# Patient Record
Sex: MALE | Race: WHITE | NOT HISPANIC OR LATINO | Employment: OTHER | URBAN - NONMETROPOLITAN AREA
[De-identification: names, ages, dates, MRNs, and addresses within clinical notes are randomized per-mention and may not be internally consistent; named-entity substitution may affect disease eponyms.]

---

## 2021-01-28 ENCOUNTER — IMMUNIZATIONS (OUTPATIENT)
Dept: FAMILY MEDICINE CLINIC | Facility: HOSPITAL | Age: 86
End: 2021-01-28

## 2021-01-28 DIAGNOSIS — Z23 ENCOUNTER FOR IMMUNIZATION: Primary | ICD-10-CM

## 2021-01-28 PROCEDURE — 91301 SARS-COV-2 / COVID-19 MRNA VACCINE (MODERNA) 100 MCG: CPT

## 2021-01-28 PROCEDURE — 0011A SARS-COV-2 / COVID-19 MRNA VACCINE (MODERNA) 100 MCG: CPT

## 2021-02-23 ENCOUNTER — IMMUNIZATIONS (OUTPATIENT)
Dept: FAMILY MEDICINE CLINIC | Facility: HOSPITAL | Age: 86
End: 2021-02-23

## 2021-02-23 DIAGNOSIS — Z23 ENCOUNTER FOR IMMUNIZATION: Primary | ICD-10-CM

## 2021-02-23 PROCEDURE — 91301 SARS-COV-2 / COVID-19 MRNA VACCINE (MODERNA) 100 MCG: CPT

## 2021-02-23 PROCEDURE — 0012A SARS-COV-2 / COVID-19 MRNA VACCINE (MODERNA) 100 MCG: CPT

## 2021-08-03 DIAGNOSIS — R60.0 LOCALIZED EDEMA: ICD-10-CM

## 2021-08-03 DIAGNOSIS — R06.02 SHORTNESS OF BREATH: ICD-10-CM

## 2021-08-03 DIAGNOSIS — E11.9 TYPE 2 DIABETES MELLITUS WITHOUT COMPLICATIONS (HCC): ICD-10-CM

## 2021-08-03 DIAGNOSIS — E78.5 HYPERLIPIDEMIA, UNSPECIFIED: ICD-10-CM

## 2021-08-03 DIAGNOSIS — R60.9 EDEMA, UNSPECIFIED: ICD-10-CM

## 2021-08-03 DIAGNOSIS — I10 ESSENTIAL (PRIMARY) HYPERTENSION: ICD-10-CM

## 2021-08-12 ENCOUNTER — HOSPITAL ENCOUNTER (OUTPATIENT)
Dept: NON INVASIVE DIAGNOSTICS | Facility: HOSPITAL | Age: 86
Discharge: HOME | End: 2021-08-12
Payer: COMMERCIAL

## 2021-08-12 DIAGNOSIS — R60.0 LOCALIZED EDEMA: ICD-10-CM

## 2021-08-12 PROCEDURE — 93970 EXTREMITY STUDY: CPT

## 2021-08-12 PROCEDURE — 93970 EXTREMITY STUDY: CPT | Performed by: SURGERY

## 2021-09-09 ENCOUNTER — HOSPITAL ENCOUNTER (OUTPATIENT)
Dept: NUCLEAR MEDICINE | Facility: HOSPITAL | Age: 86
Discharge: HOME | End: 2021-09-09
Payer: COMMERCIAL

## 2021-09-09 ENCOUNTER — HOSPITAL ENCOUNTER (OUTPATIENT)
Dept: NON INVASIVE DIAGNOSTICS | Facility: HOSPITAL | Age: 86
Discharge: HOME | End: 2021-09-09
Payer: COMMERCIAL

## 2021-09-09 DIAGNOSIS — R06.02 SHORTNESS OF BREATH: ICD-10-CM

## 2021-09-09 DIAGNOSIS — E11.69 TYPE 2 DIABETES MELLITUS WITH OTHER SPECIFIED COMPLICATION, UNSPECIFIED WHETHER LONG TERM INSULIN USE (HCC): ICD-10-CM

## 2021-09-09 DIAGNOSIS — R60.9 EDEMA, UNSPECIFIED TYPE: ICD-10-CM

## 2021-09-09 DIAGNOSIS — I15.9 SECONDARY HYPERTENSION: ICD-10-CM

## 2021-09-09 DIAGNOSIS — E78.5 DYSLIPIDEMIA: ICD-10-CM

## 2021-09-09 PROCEDURE — 78452 HT MUSCLE IMAGE SPECT MULT: CPT

## 2021-09-09 PROCEDURE — A9502 TC99M TETROFOSMIN: HCPCS

## 2021-09-09 PROCEDURE — 93017 CV STRESS TEST TRACING ONLY: CPT

## 2021-09-09 PROCEDURE — G1004 CDSM NDSC: HCPCS

## 2021-09-09 RX ORDER — LISINOPRIL 20 MG/1
TABLET ORAL
COMMUNITY
Start: 2021-09-06

## 2021-09-09 RX ORDER — POTASSIUM CHLORIDE 750 MG/1
10 CAPSULE, EXTENDED RELEASE ORAL DAILY
COMMUNITY
Start: 2021-08-02

## 2021-09-09 RX ORDER — FUROSEMIDE 20 MG/1
20 TABLET ORAL DAILY
COMMUNITY
Start: 2021-08-02

## 2021-09-09 RX ORDER — LATANOPROST 50 UG/ML
SOLUTION/ DROPS OPHTHALMIC
COMMUNITY
Start: 2021-08-10

## 2021-09-09 RX ORDER — TIMOLOL MALEATE 5 MG/ML
SOLUTION/ DROPS OPHTHALMIC
COMMUNITY
Start: 2021-06-21

## 2021-09-09 RX ORDER — MELOXICAM 7.5 MG/1
7.5 TABLET ORAL DAILY
COMMUNITY

## 2021-09-09 RX ADMIN — REGADENOSON 0.4 MG: 0.08 INJECTION, SOLUTION INTRAVENOUS at 10:33

## 2021-09-27 LAB
ARRHY DURING EX: NORMAL
CHEST PAIN STATEMENT: NORMAL
MAX DIASTOLIC BP: 98 MMHG
MAX HEART RATE: 86 BPM
MAX PREDICTED HEART RATE: 136 BPM
MAX. SYSTOLIC BP: 198 MMHG
PROTOCOL NAME: NORMAL
REASON FOR TERMINATION: NORMAL
TARGET HR FORMULA: NORMAL
TEST INDICATION: NORMAL
TIME IN EXERCISE PHASE: NORMAL

## 2021-10-21 ENCOUNTER — HOSPITAL ENCOUNTER (OUTPATIENT)
Dept: NON INVASIVE DIAGNOSTICS | Facility: HOSPITAL | Age: 86
Discharge: HOME | End: 2021-10-21
Payer: COMMERCIAL

## 2021-10-21 VITALS
HEART RATE: 70 BPM | HEIGHT: 67 IN | BODY MASS INDEX: 31.23 KG/M2 | DIASTOLIC BLOOD PRESSURE: 76 MMHG | SYSTOLIC BLOOD PRESSURE: 132 MMHG | WEIGHT: 199 LBS

## 2021-10-21 DIAGNOSIS — E11.9 TYPE 2 DIABETES MELLITUS WITHOUT COMPLICATIONS (HCC): ICD-10-CM

## 2021-10-21 DIAGNOSIS — I10 ESSENTIAL (PRIMARY) HYPERTENSION: ICD-10-CM

## 2021-10-21 DIAGNOSIS — R60.9 EDEMA, UNSPECIFIED: ICD-10-CM

## 2021-10-21 DIAGNOSIS — R06.02 SHORTNESS OF BREATH: ICD-10-CM

## 2021-10-21 DIAGNOSIS — E78.5 HYPERLIPIDEMIA, UNSPECIFIED: ICD-10-CM

## 2021-10-21 LAB
AORTIC ROOT: 4.4 CM
APICAL FOUR CHAMBER EJECTION FRACTION: 70 %
ASCENDING AORTA: 3.3 CM
FRACTIONAL SHORTENING: 24 % (ref 28–44)
INTERVENTRICULAR SEPTUM IN DIASTOLE (PARASTERNAL SHORT AXIS VIEW): 1 CM
LAAS-AP4: 16 CM2
LEFT INTERNAL DIMENSION IN SYSTOLE: 2.8 CM (ref 2.1–4)
LEFT VENTRICULAR INTERNAL DIMENSION IN DIASTOLE: 3.7 CM (ref 5.28–7.87)
LEFT VENTRICULAR POSTERIOR WALL IN END DIASTOLE: 1 CM
LEFT VENTRICULAR STROKE VOLUME: 28 ML
LV EF: 41 %
RIGHT ATRIAL 2D VOLUME: 32 ML
RIGHT VENTRICLE ID DIMENSION: 3 CM
RV PSP: 31 MMHG
SL CV LV EF: 60
SL CV PED ECHO LEFT VENTRICLE DIASTOLIC VOLUME (MOD BIPLANE) 2D: 57 ML
SL CV PED ECHO LEFT VENTRICLE SYSTOLIC VOLUME (MOD BIPLANE) 2D: 29 ML
TR PEAK VELOCITY: 2.6 M/S
TRICUSPID VALVE PEAK REGURGITATION VELOCITY: 2.55 M/S
TRICUSPID VALVE S': 50 CM/S
TV PEAK GRADIENT: 26 MMHG
Z-SCORE OF LEFT VENTRICULAR DIMENSION IN END SYSTOLE: -5.55

## 2021-10-21 PROCEDURE — 93306 TTE W/DOPPLER COMPLETE: CPT

## 2021-11-02 ENCOUNTER — IMMUNIZATIONS (OUTPATIENT)
Dept: FAMILY MEDICINE CLINIC | Facility: HOSPITAL | Age: 86
End: 2021-11-02

## 2021-11-02 DIAGNOSIS — Z23 ENCOUNTER FOR IMMUNIZATION: Primary | ICD-10-CM

## 2023-04-14 PROBLEM — M17.0 PRIMARY OSTEOARTHRITIS OF BOTH KNEES: Status: ACTIVE | Noted: 2023-04-14

## 2023-04-14 PROBLEM — M25.561 CHRONIC PAIN OF BOTH KNEES: Status: ACTIVE | Noted: 2023-04-14

## 2023-04-14 PROBLEM — G89.29 CHRONIC PAIN OF BOTH KNEES: Status: ACTIVE | Noted: 2023-04-14

## 2023-04-14 PROBLEM — M25.562 CHRONIC PAIN OF BOTH KNEES: Status: ACTIVE | Noted: 2023-04-14

## 2024-05-14 ENCOUNTER — HOSPITAL ENCOUNTER (OUTPATIENT)
Dept: NON INVASIVE DIAGNOSTICS | Facility: HOSPITAL | Age: 89
Discharge: HOME | End: 2024-05-14
Payer: COMMERCIAL

## 2024-05-14 VITALS
BODY MASS INDEX: 29.62 KG/M2 | HEART RATE: 72 BPM | HEIGHT: 69 IN | SYSTOLIC BLOOD PRESSURE: 160 MMHG | DIASTOLIC BLOOD PRESSURE: 80 MMHG | WEIGHT: 200 LBS

## 2024-05-14 DIAGNOSIS — R06.02 SHORTNESS OF BREATH: ICD-10-CM

## 2024-05-14 DIAGNOSIS — R01.1 CARDIAC MURMUR, UNSPECIFIED: ICD-10-CM

## 2024-05-14 DIAGNOSIS — I10 ESSENTIAL (PRIMARY) HYPERTENSION: ICD-10-CM

## 2024-05-14 LAB
AORTIC ROOT: 3.6 CM
ASCENDING AORTA: 2.9 CM
BSA FOR ECHO PROCEDURE: 2.07 M2
RIGHT VENTRICLE ID DIMENSION: 4.2 CM
SL CV LV EF: 74

## 2024-05-14 PROCEDURE — 93306 TTE W/DOPPLER COMPLETE: CPT

## 2025-01-08 ENCOUNTER — PREP FOR PROCEDURE (OUTPATIENT)
Dept: PAIN MEDICINE | Facility: CLINIC | Age: 89
End: 2025-01-08

## 2025-01-08 ENCOUNTER — HOSPITAL ENCOUNTER (OUTPATIENT)
Dept: RADIOLOGY | Facility: CLINIC | Age: 89
Discharge: HOME | End: 2025-01-08
Payer: COMMERCIAL

## 2025-01-08 VITALS — WEIGHT: 198.4 LBS | HEIGHT: 67 IN | BODY MASS INDEX: 31.14 KG/M2

## 2025-01-08 DIAGNOSIS — M25.562 CHRONIC PAIN OF BOTH KNEES: ICD-10-CM

## 2025-01-08 DIAGNOSIS — M25.561 CHRONIC PAIN OF BOTH KNEES: ICD-10-CM

## 2025-01-08 DIAGNOSIS — M17.0 PRIMARY OSTEOARTHRITIS OF BOTH KNEES: Primary | ICD-10-CM

## 2025-01-08 DIAGNOSIS — G89.29 CHRONIC PAIN OF BOTH KNEES: ICD-10-CM

## 2025-01-08 DIAGNOSIS — M17.0 PRIMARY OSTEOARTHRITIS OF BOTH KNEES: ICD-10-CM

## 2025-01-08 PROBLEM — M17.11 PRIMARY OSTEOARTHRITIS OF RIGHT KNEE: Status: ACTIVE | Noted: 2023-04-14

## 2025-01-08 PROCEDURE — 73564 X-RAY EXAM KNEE 4 OR MORE: CPT

## 2025-01-08 PROCEDURE — 99215 OFFICE O/P EST HI 40 MIN: CPT | Performed by: ORTHOPAEDIC SURGERY

## 2025-01-08 RX ORDER — ASCORBIC ACID 500 MG
500 TABLET ORAL 2 TIMES DAILY
Qty: 120 TABLET | Refills: 0 | Status: SHIPPED | OUTPATIENT
Start: 2025-01-08

## 2025-01-08 RX ORDER — VITAMIN B COMPLEX
1000 TABLET ORAL DAILY
Qty: 60 TABLET | Refills: 0 | Status: SHIPPED | OUTPATIENT
Start: 2025-01-08

## 2025-01-08 RX ORDER — AMLODIPINE BESYLATE 2.5 MG/1
5 TABLET ORAL DAILY
COMMUNITY
Start: 2025-01-02

## 2025-01-08 RX ORDER — MULTIVIT-MIN/IRON FUM/FOLIC AC 7.5 MG-4
1 TABLET ORAL DAILY
Qty: 60 TABLET | Refills: 0 | Status: SHIPPED | OUTPATIENT
Start: 2025-01-08

## 2025-01-08 RX ORDER — CHLORHEXIDINE GLUCONATE ORAL RINSE 1.2 MG/ML
15 SOLUTION DENTAL ONCE
OUTPATIENT
Start: 2025-01-08 | End: 2025-01-08

## 2025-01-08 RX ORDER — MUPIROCIN 20 MG/G
OINTMENT TOPICAL
Qty: 15 G | Refills: 1 | Status: SHIPPED | OUTPATIENT
Start: 2025-01-08

## 2025-01-08 RX ORDER — ZINC SULFATE 50(220)MG
220 CAPSULE ORAL DAILY
Qty: 60 CAPSULE | Refills: 0 | Status: SHIPPED | OUTPATIENT
Start: 2025-01-08

## 2025-01-08 RX ORDER — CHLORHEXIDINE GLUCONATE 40 MG/ML
SOLUTION TOPICAL DAILY PRN
OUTPATIENT
Start: 2025-01-08

## 2025-01-08 RX ORDER — FOLIC ACID 1 MG/1
1 TABLET ORAL DAILY
Qty: 60 TABLET | Refills: 0 | Status: SHIPPED | OUTPATIENT
Start: 2025-01-08

## 2025-01-08 RX ORDER — SODIUM CHLORIDE, SODIUM LACTATE, POTASSIUM CHLORIDE, CALCIUM CHLORIDE 600; 310; 30; 20 MG/100ML; MG/100ML; MG/100ML; MG/100ML
125 INJECTION, SOLUTION INTRAVENOUS CONTINUOUS
OUTPATIENT
Start: 2025-01-08

## 2025-01-08 NOTE — H&P (VIEW-ONLY)
ASSESSMENT/PLAN:    Diagnoses and all orders for this visit:    Primary osteoarthritis of both knees  -     XR knee 4+ vw right injury; Future      Chronic pain of both knees  -     XR knee 4+ vw right injury; Future    Diabetes mellitus; hypertension          Plan: Patient was informed of the risks, benefits, options and alternatives of treatment, including but not limited to:  Infection, blood loss, DVT, PE, prosthetic failure, wound healing problems, nerve or blood vessel injury, fracture, need for additional surgery, anesthetic risks, persistent symptoms and loss of motion.  After a thorough discussion, the patient elects to proceed with right knee replacement arthroplast scheduled for 1/28/2025.  Preoperative testing will be ordered as indicated as well as medical clearance.  The patient will not be able to participate in the [facility] based preoperative medical evaluation as he does not have a computer at home and no access to computer based services.  He is willing to proceed with surgery as a planned same-day discharge.  Postoperative DVT prophylaxis was discussed and he will be placed on aspirin postoperatively.  The patient will be seen 2 weeks postoperatively for the initial follow-up in the office after surgery.    Return for 2 weeks postop.      _____________________________________________________  CHIEF COMPLAINT:  Chief Complaint   Patient presents with    Right Knee - Pain         SUBJECTIVE:  Toni Mahmood is a 89 y.o. year old male who presents for evaluation of his bilateral knee pain.  Once again, as he did in April 2023, he states his left knee is only minimally symptomatic.  His right knee is his primary issue.  He underwent a corticosteroid injection in April 2023 and states that he received no better than a 1 to 2-week relief of symptoms.  When questioned as to why he did not return sooner he states that he is hardheaded and did not want to seek any additional treatment.  At this time, he  has now having increasing difficulty with routine daily activities.  He does note pain at rest on an intermittent basis especially if he is sitting in certain chairs.  He notes pain with any activity.  He does tend to push through the pain despite it.  He notes start up pain and stiffness.    PAST MEDICAL HISTORY:  Past Medical History:   Diagnosis Date    Diabetes mellitus (HCC)     Glaucoma     High cholesterol        PAST SURGICAL HISTORY:  Past Surgical History:   Procedure Laterality Date    CATARACT EXTRACTION         FAMILY HISTORY:  History reviewed. No pertinent family history.    SOCIAL HISTORY:  Social History     Tobacco Use    Smoking status: Never    Smokeless tobacco: Never   Vaping Use    Vaping status: Never Used   Substance Use Topics    Alcohol use: Not Currently    Drug use: Never       MEDICATIONS:    Current Outpatient Medications:     amLODIPine (NORVASC) 2.5 mg tablet, Take 2.5 mg by mouth every morning, Disp: , Rfl:     ascorbic acid (VITAMIN C) 500 MG tablet, Take 1 tablet (500 mg total) by mouth 2 (two) times a day, Disp: 120 tablet, Rfl: 0    Aspirin Buf,CaCarb-MgCarb-MgO, 81 MG TABS, Take by mouth, Disp: , Rfl:     cholecalciferol (VITAMIN D3) 25 mcg (1,000 units) tablet, Take 1 tablet (1,000 Units total) by mouth daily, Disp: 60 tablet, Rfl: 0    ferrous sulfate 325 (65 Fe) mg tablet, Take 325 mg by mouth daily with breakfast, Disp: , Rfl:     folic acid (FOLVITE) 1 mg tablet, Take 1 tablet (1 mg total) by mouth daily, Disp: 60 tablet, Rfl: 0    furosemide (LASIX) 20 mg tablet, Take 20 mg by mouth daily, Disp: , Rfl:     latanoprost (XALATAN) 0.005 % ophthalmic solution, , Disp: , Rfl:     lisinopril (ZESTRIL) 20 mg tablet, , Disp: , Rfl:     metFORMIN (GLUCOPHAGE) 500 mg tablet, TAKE 1 TABLET BY MOUTH TWICE DAILY WITH MORNING MEAL AND WITH EVENING MEAL, Disp: , Rfl:     Multiple Vitamins-Minerals (multivitamin with minerals) tablet, Take 1 tablet by mouth daily, Disp: 60 tablet, Rfl:  "0    mupirocin (BACTROBAN) 2 % ointment, Apply topically to the inside of the left and right nostrils twice daily for 5 days before surgery, including the morning of surgery., Disp: 15 g, Rfl: 1    potassium chloride (MICRO-K) 10 MEQ CR capsule, Take 10 mEq by mouth daily, Disp: , Rfl:     timolol (TIMOPTIC) 0.5 % ophthalmic solution, , Disp: , Rfl:     zinc sulfate (ZINCATE) 220 mg capsule, Take 1 capsule (220 mg total) by mouth daily, Disp: 60 capsule, Rfl: 0    ALLERGIES:  No Known Allergies    Review of systems:   Constitutional: Negative for fatigue, fever or loss of apetite.   HENT: Negative.    Respiratory: Negative for shortness of breath, dyspnea.    Cardiovascular: Negative for chest pain/tightness.   Gastrointestinal: Negative for abdominal pain, N/V.   Endocrine: Negative for cold/heat intolerance, unexplained weight loss/gain.   Genitourinary: Negative for flank pain, dysuria, hematuria.   Musculoskeletal: Positive as in the HPI  Skin: Negative for rash.    Neurological: Negative  Psychiatric/Behavioral: Negative for agitation.  _____________________________________________________  PHYSICAL EXAMINATION:    Height 5' 7\" (1.702 m), weight 90 kg (198 lb 6.4 oz).    General: well developed and well nourished, alert, oriented times 3, and appears comfortable  Psychiatric: Normal  HEENT: Benign  Cardiovascular: Regular    Pulmonary: No wheezing or stridor  Abdomen: Soft, Nontender  Skin: No masses, erythema, lacerations, fluctation, ulcerations  Neurovascular: Motor and sensory exams are grossly intact and pulses are palpable.    MUSCULOSKELETAL EXAMINATION:    The bilateral knee exam demonstrates the skin to be warm and dry.  He has full extension of both knees with some mild pain at end range extension.  He has flexion of the left knee to 120 degrees without complaints and of the right knee to 110 degrees with complaints of pain.  Hypertrophy is noted of the right knee greater than the left knee.  He has " varus deformity of the right knee.  The left knee has minimal, if any, deformity.  Varus of the right knee can be corrected toward neutral with valgus stress.  The left knee demonstrates no correction of any underlying deformity.  Ligaments are grossly stable, however.  There is some crepitus noted with range of motion of the right knee, not on the left.  There is no effusion noted.  The remainder of the lower extremity examination bilaterally is benign.      _____________________________________________________  STUDIES REVIEWED:  X-rays of the right knee demonstrate advanced degenerative disease with complete loss of medial joint space, subchondral sclerosis, osteophytes and varus deformity.  There are moderate to significant changes noted at the patellofemoral joint and moderate changes at the lateral tibial femoral compartment.      Brandon Bunn DO

## 2025-01-08 NOTE — PROGRESS NOTES
ASSESSMENT/PLAN:    Diagnoses and all orders for this visit:    Primary osteoarthritis of both knees  -     XR knee 4+ vw right injury; Future      Chronic pain of both knees  -     XR knee 4+ vw right injury; Future    Diabetes mellitus; hypertension          Plan: Patient was informed of the risks, benefits, options and alternatives of treatment, including but not limited to:  Infection, blood loss, DVT, PE, prosthetic failure, wound healing problems, nerve or blood vessel injury, fracture, need for additional surgery, anesthetic risks, persistent symptoms and loss of motion.  After a thorough discussion, the patient elects to proceed with right knee replacement arthroplast scheduled for 1/28/2025.  Preoperative testing will be ordered as indicated as well as medical clearance.  The patient will not be able to participate in the [facility] based preoperative medical evaluation as he does not have a computer at home and no access to computer based services.  He is willing to proceed with surgery as a planned same-day discharge.  Postoperative DVT prophylaxis was discussed and he will be placed on aspirin postoperatively.  The patient will be seen 2 weeks postoperatively for the initial follow-up in the office after surgery.    Return for 2 weeks postop.      _____________________________________________________  CHIEF COMPLAINT:  Chief Complaint   Patient presents with    Right Knee - Pain         SUBJECTIVE:  Toni Mahmood is a 89 y.o. year old male who presents for evaluation of his bilateral knee pain.  Once again, as he did in April 2023, he states his left knee is only minimally symptomatic.  His right knee is his primary issue.  He underwent a corticosteroid injection in April 2023 and states that he received no better than a 1 to 2-week relief of symptoms.  When questioned as to why he did not return sooner he states that he is hardheaded and did not want to seek any additional treatment.  At this time, he  has now having increasing difficulty with routine daily activities.  He does note pain at rest on an intermittent basis especially if he is sitting in certain chairs.  He notes pain with any activity.  He does tend to push through the pain despite it.  He notes start up pain and stiffness.    PAST MEDICAL HISTORY:  Past Medical History:   Diagnosis Date    Diabetes mellitus (HCC)     Glaucoma     High cholesterol        PAST SURGICAL HISTORY:  Past Surgical History:   Procedure Laterality Date    CATARACT EXTRACTION         FAMILY HISTORY:  History reviewed. No pertinent family history.    SOCIAL HISTORY:  Social History     Tobacco Use    Smoking status: Never    Smokeless tobacco: Never   Vaping Use    Vaping status: Never Used   Substance Use Topics    Alcohol use: Not Currently    Drug use: Never       MEDICATIONS:    Current Outpatient Medications:     amLODIPine (NORVASC) 2.5 mg tablet, Take 2.5 mg by mouth every morning, Disp: , Rfl:     ascorbic acid (VITAMIN C) 500 MG tablet, Take 1 tablet (500 mg total) by mouth 2 (two) times a day, Disp: 120 tablet, Rfl: 0    Aspirin Buf,CaCarb-MgCarb-MgO, 81 MG TABS, Take by mouth, Disp: , Rfl:     cholecalciferol (VITAMIN D3) 25 mcg (1,000 units) tablet, Take 1 tablet (1,000 Units total) by mouth daily, Disp: 60 tablet, Rfl: 0    ferrous sulfate 325 (65 Fe) mg tablet, Take 325 mg by mouth daily with breakfast, Disp: , Rfl:     folic acid (FOLVITE) 1 mg tablet, Take 1 tablet (1 mg total) by mouth daily, Disp: 60 tablet, Rfl: 0    furosemide (LASIX) 20 mg tablet, Take 20 mg by mouth daily, Disp: , Rfl:     latanoprost (XALATAN) 0.005 % ophthalmic solution, , Disp: , Rfl:     lisinopril (ZESTRIL) 20 mg tablet, , Disp: , Rfl:     metFORMIN (GLUCOPHAGE) 500 mg tablet, TAKE 1 TABLET BY MOUTH TWICE DAILY WITH MORNING MEAL AND WITH EVENING MEAL, Disp: , Rfl:     Multiple Vitamins-Minerals (multivitamin with minerals) tablet, Take 1 tablet by mouth daily, Disp: 60 tablet, Rfl:  "0    mupirocin (BACTROBAN) 2 % ointment, Apply topically to the inside of the left and right nostrils twice daily for 5 days before surgery, including the morning of surgery., Disp: 15 g, Rfl: 1    potassium chloride (MICRO-K) 10 MEQ CR capsule, Take 10 mEq by mouth daily, Disp: , Rfl:     timolol (TIMOPTIC) 0.5 % ophthalmic solution, , Disp: , Rfl:     zinc sulfate (ZINCATE) 220 mg capsule, Take 1 capsule (220 mg total) by mouth daily, Disp: 60 capsule, Rfl: 0    ALLERGIES:  No Known Allergies    Review of systems:   Constitutional: Negative for fatigue, fever or loss of apetite.   HENT: Negative.    Respiratory: Negative for shortness of breath, dyspnea.    Cardiovascular: Negative for chest pain/tightness.   Gastrointestinal: Negative for abdominal pain, N/V.   Endocrine: Negative for cold/heat intolerance, unexplained weight loss/gain.   Genitourinary: Negative for flank pain, dysuria, hematuria.   Musculoskeletal: Positive as in the HPI  Skin: Negative for rash.    Neurological: Negative  Psychiatric/Behavioral: Negative for agitation.  _____________________________________________________  PHYSICAL EXAMINATION:    Height 5' 7\" (1.702 m), weight 90 kg (198 lb 6.4 oz).    General: well developed and well nourished, alert, oriented times 3, and appears comfortable  Psychiatric: Normal  HEENT: Benign  Cardiovascular: Regular    Pulmonary: No wheezing or stridor  Abdomen: Soft, Nontender  Skin: No masses, erythema, lacerations, fluctation, ulcerations  Neurovascular: Motor and sensory exams are grossly intact and pulses are palpable.    MUSCULOSKELETAL EXAMINATION:    The bilateral knee exam demonstrates the skin to be warm and dry.  He has full extension of both knees with some mild pain at end range extension.  He has flexion of the left knee to 120 degrees without complaints and of the right knee to 110 degrees with complaints of pain.  Hypertrophy is noted of the right knee greater than the left knee.  He has " varus deformity of the right knee.  The left knee has minimal, if any, deformity.  Varus of the right knee can be corrected toward neutral with valgus stress.  The left knee demonstrates no correction of any underlying deformity.  Ligaments are grossly stable, however.  There is some crepitus noted with range of motion of the right knee, not on the left.  There is no effusion noted.  The remainder of the lower extremity examination bilaterally is benign.      _____________________________________________________  STUDIES REVIEWED:  X-rays of the right knee demonstrate advanced degenerative disease with complete loss of medial joint space, subchondral sclerosis, osteophytes and varus deformity.  There are moderate to significant changes noted at the patellofemoral joint and moderate changes at the lateral tibial femoral compartment.      Brandon Bunn DO

## 2025-01-09 ENCOUNTER — LAB (OUTPATIENT)
Dept: LAB | Facility: HOSPITAL | Age: 89
End: 2025-01-09
Attending: ORTHOPAEDIC SURGERY
Payer: COMMERCIAL

## 2025-01-09 ENCOUNTER — APPOINTMENT (OUTPATIENT)
Dept: LAB | Facility: HOSPITAL | Age: 89
End: 2025-01-09
Payer: COMMERCIAL

## 2025-01-09 ENCOUNTER — TELEPHONE (OUTPATIENT)
Dept: OBGYN CLINIC | Facility: HOSPITAL | Age: 89
End: 2025-01-09

## 2025-01-09 DIAGNOSIS — M17.0 PRIMARY OSTEOARTHRITIS OF BOTH KNEES: ICD-10-CM

## 2025-01-09 LAB
ALBUMIN SERPL BCG-MCNC: 4.2 G/DL (ref 3.5–5)
ALP SERPL-CCNC: 65 U/L (ref 34–104)
ALT SERPL W P-5'-P-CCNC: 14 U/L (ref 7–52)
ANION GAP SERPL CALCULATED.3IONS-SCNC: 4 MMOL/L (ref 4–13)
APTT PPP: 32 SECONDS (ref 23–34)
AST SERPL W P-5'-P-CCNC: 14 U/L (ref 13–39)
BASOPHILS # BLD AUTO: 0.05 THOUSANDS/ΜL (ref 0–0.1)
BASOPHILS NFR BLD AUTO: 1 % (ref 0–1)
BILIRUB SERPL-MCNC: 0.58 MG/DL (ref 0.2–1)
BUN SERPL-MCNC: 18 MG/DL (ref 5–25)
CALCIUM SERPL-MCNC: 9.9 MG/DL (ref 8.4–10.2)
CHLORIDE SERPL-SCNC: 102 MMOL/L (ref 96–108)
CO2 SERPL-SCNC: 33 MMOL/L (ref 21–32)
CREAT SERPL-MCNC: 0.87 MG/DL (ref 0.6–1.3)
EOSINOPHIL # BLD AUTO: 0.34 THOUSAND/ΜL (ref 0–0.61)
EOSINOPHIL NFR BLD AUTO: 5 % (ref 0–6)
ERYTHROCYTE [DISTWIDTH] IN BLOOD BY AUTOMATED COUNT: 13.3 % (ref 11.6–15.1)
EST. AVERAGE GLUCOSE BLD GHB EST-MCNC: 166 MG/DL
GFR SERPL CREATININE-BSD FRML MDRD: 77 ML/MIN/1.73SQ M
GLUCOSE P FAST SERPL-MCNC: 119 MG/DL (ref 65–99)
HBA1C MFR BLD: 7.4 %
HCT VFR BLD AUTO: 38.4 % (ref 36.5–49.3)
HGB BLD-MCNC: 12.5 G/DL (ref 12–17)
IMM GRANULOCYTES # BLD AUTO: 0.04 THOUSAND/UL (ref 0–0.2)
IMM GRANULOCYTES NFR BLD AUTO: 1 % (ref 0–2)
INR PPP: 0.96 (ref 0.85–1.19)
LYMPHOCYTES # BLD AUTO: 1.58 THOUSANDS/ΜL (ref 0.6–4.47)
LYMPHOCYTES NFR BLD AUTO: 23 % (ref 14–44)
MCH RBC QN AUTO: 28.5 PG (ref 26.8–34.3)
MCHC RBC AUTO-ENTMCNC: 32.6 G/DL (ref 31.4–37.4)
MCV RBC AUTO: 88 FL (ref 82–98)
MONOCYTES # BLD AUTO: 0.62 THOUSAND/ΜL (ref 0.17–1.22)
MONOCYTES NFR BLD AUTO: 9 % (ref 4–12)
NEUTROPHILS # BLD AUTO: 4.34 THOUSANDS/ΜL (ref 1.85–7.62)
NEUTS SEG NFR BLD AUTO: 61 % (ref 43–75)
NRBC BLD AUTO-RTO: 0 /100 WBCS
PLATELET # BLD AUTO: 189 THOUSANDS/UL (ref 149–390)
PMV BLD AUTO: 9.2 FL (ref 8.9–12.7)
POTASSIUM SERPL-SCNC: 4.4 MMOL/L (ref 3.5–5.3)
PROT SERPL-MCNC: 7.4 G/DL (ref 6.4–8.4)
PROTHROMBIN TIME: 13.2 SECONDS (ref 12.3–15)
RBC # BLD AUTO: 4.39 MILLION/UL (ref 3.88–5.62)
SODIUM SERPL-SCNC: 139 MMOL/L (ref 135–147)
WBC # BLD AUTO: 6.97 THOUSAND/UL (ref 4.31–10.16)

## 2025-01-09 PROCEDURE — 85730 THROMBOPLASTIN TIME PARTIAL: CPT

## 2025-01-09 PROCEDURE — 85025 COMPLETE CBC W/AUTO DIFF WBC: CPT

## 2025-01-09 PROCEDURE — 87081 CULTURE SCREEN ONLY: CPT

## 2025-01-09 PROCEDURE — 83036 HEMOGLOBIN GLYCOSYLATED A1C: CPT

## 2025-01-09 PROCEDURE — 36415 COLL VENOUS BLD VENIPUNCTURE: CPT

## 2025-01-09 PROCEDURE — 93005 ELECTROCARDIOGRAM TRACING: CPT

## 2025-01-09 PROCEDURE — 80053 COMPREHEN METABOLIC PANEL: CPT

## 2025-01-09 PROCEDURE — 85610 PROTHROMBIN TIME: CPT

## 2025-01-09 NOTE — TELEPHONE ENCOUNTER
Preoperative Elective Admission Assessment- spoke to wife and patient     EKG/LAB/MRSA SWAB/CXR date: 01/09    Living Situation:    Who does pt live with: spouse  What kind of home: ranch  How do they enter the home: front  How many levels in home: 1  # of steps to enter home: 5  # of steps to second floor: N/A  Are there handrails: Yes  Are there landings: No  Sleeping arrangement: first/entry floor  Where is Bathroom: first floor   Where is the tub or shower: tub shower with shower bench   Toilet height? Concerns for low toilets pt has handicap toilet already   Dogs or ther pets: no     First Floor Setup:   Is there a bathroom: Yes  Where would pt sleep: bed     DME: pt has walker; advised to bring dos   We discussed clearing pathways in the home and making sure there is accessibly to use the walker, for example, removing throw rugs.      Patient's Current Level of Function: Ambulates: Independently    Post-op Caregiver: spouse  Caregiver Name and phone number for Inpatient discharge needs: Emelyn [phone]  Currently receive any HHC/aides/community supports: No     Post-op Transport: spouse  To/from hospital: spouse  To/from PT 2-3x/week: spouse  Uses community transport now: No     Outpatient Physical Therapy Site:  Site: [facility]   pre and post-op appts scheduled? Yes     Medication Management: pillbox  Preferred Pharmacy for Post-op Medications: [facility] [address] [place] [address] [facility] [[id number]]   Blood Management Vitamin Regimen: Pt confirms taking as prescribed  Post-op anticoagulant: to be determined by surgical team postoperatively  Has Bactroban for 5 days preop: Yes  Educated on Preoperative Bathing Instructions, and use of Soap for 5 days before surgery.      DC Plan: Pt plans to be discharged home      Barriers to DC identified preoperatively: none identified    BMI: 31.07    Patient Education:  Pt educated on post-op pain, early mobilization (POD0), LOS goals, OP PT goals, and  preoperative bathing. Patient educated that our goal is to appropriately discharge patient based off their post-op function while striving to maintain maximal independence. The goal is to discharge patient to home and for them to attend outpatient physical therapy.    Assigned to care team? Yes

## 2025-01-10 LAB
ATRIAL RATE: 61 BPM
MRSA NOSE QL CULT: NORMAL
PR INTERVAL: 286 MS
QRS AXIS: -55 DEGREES
QRSD INTERVAL: 128 MS
QT INTERVAL: 454 MS
QTC INTERVAL: 457 MS
T WAVE AXIS: -18 DEGREES
VENTRICULAR RATE: 61 BPM

## 2025-01-16 ENCOUNTER — ANESTHESIA EVENT (OUTPATIENT)
Dept: PERIOP | Facility: HOSPITAL | Age: 89
End: 2025-01-16
Payer: COMMERCIAL

## 2025-01-16 NOTE — PRE-PROCEDURE INSTRUCTIONS
Pre-Surgery Instructions:   Medication Instructions    amLODIPine (NORVASC) 2.5 mg tablet Take day of surgery.    ascorbic acid (VITAMIN C) 500 MG tablet Hold day of surgery.    Aspirin Buf,CaCarb-MgCarb-MgO, 81 MG TABS Hold day of surgery.    cholecalciferol (VITAMIN D3) 25 mcg (1,000 units) tablet Hold day of surgery.    ferrous sulfate 325 (65 Fe) mg tablet Hold day of surgery.    folic acid (FOLVITE) 1 mg tablet Hold day of surgery.    furosemide (LASIX) 20 mg tablet Hold day of surgery.    latanoprost (XALATAN) 0.005 % ophthalmic solution Take night before surgery    lisinopril (ZESTRIL) 20 mg tablet Hold day of surgery.    metFORMIN (GLUCOPHAGE) 500 mg tablet Hold day of surgery.    Multiple Vitamins-Minerals (multivitamin with minerals) tablet Hold day of surgery.    mupirocin (BACTROBAN) 2 % ointment Take day of surgery.    potassium chloride (MICRO-K) 10 MEQ CR capsule Hold day of surgery.    timolol (TIMOPTIC) 0.5 % ophthalmic solution Take day of surgery.    zinc sulfate (ZINCATE) 220 mg capsule Hold day of surgery.    Medication instructions for day surgery reviewed. Please use only a sip of water to take your instructed medications. Avoid all over the counter vitamins, supplements and NSAIDS for one week prior to surgery per anesthesia guidelines. Tylenol is ok to take as needed.     You will receive a call one business day prior to surgery with an arrival time and hospital directions. If your surgery is scheduled on a Monday, the hospital will be calling you on the Friday prior to your surgery. If you have not heard from anyone by 8pm, please call the hospital supervisor through the hospital  at [phone]. ([place] [phone] or [place] [phone]).    Do not eat or drink anything after midnight the night before your surgery, including candy, mints, lifesavers, or chewing gum. Do not drink alcohol 24hrs before your surgery. Try not to smoke at least 24hrs before your surgery.    "    Follow the pre surgery showering instructions as listed in the “My Surgical Experience Booklet” or otherwise provided by your surgeon's office. Do not use a blade to shave the surgical area 1 week before surgery. It is okay to use a clean electric clippers up to 24 hours before surgery. Do not apply any lotions, creams, including makeup, cologne, deodorant, or perfumes after showering on the day of your surgery. Do not use dry shampoo, hair spray, hair gel, or any type of hair products.     No contact lenses, eye make-up, or artificial eyelashes. Remove nail polish, including gel polish, and any artificial, gel, or acrylic nails if possible. Remove all jewelry including rings and body piercing jewelry.     Wear causal clothing that is easy to take on and off. Consider your type of surgery.    Keep any valuables, jewelry, piercings at home. Please bring any specially ordered equipment (sling, braces) if indicated.    Arrange for a responsible person to drive you to and from the hospital on the day of your surgery. Please confirm the visitor policy for the day of your procedure when you receive your phone call with an arrival time.     Call the surgeon's office with any new illnesses, exposures, or additional questions prior to surgery.    Please reference your “My Surgical Experience Booklet” for additional information to prepare for your upcoming surgery.     Confirmed patient received skin cleanser and showering instructions.    See Geriatric Assessment below...  Cognitive Assessment:   CAM:   TUG <15 sec:  Falls (last 6 months):   Hand  score:  -Attempt 1:  -Attempt 2:  -Attempt 3:  Victor Manuel Total Score: 18  PHQ- 9 Depression Scale: 0  Nutrition Assessment Score: 14  METS: 6.36  Incentive Spirometry Level:   Health goals:  -What are your overall health goals? (quit smoking, wt. loss, rest, decrease stress)  \"I want to get my knee better so I can be more active.\"  -What brings you strength? (family, friends, " "[employer], health)  \"God and family.\"  -What activities are important to you? (exercise, reading, travel, work)  \"I like to hunt and walk in nature.\"     "

## 2025-01-21 NOTE — PROGRESS NOTES
PT Evaluation     Today's date: 2025  Patient name: Toni Mahmood  : 1935  MRN: [id number]  Referring provider: Brandon Bnun DO  Dx:   Encounter Diagnosis     ICD-10-CM    1. Gait abnormality  R26.9       2. Primary osteoarthritis of both knees  M17.0 Ambulatory referral to Physical Therapy      3. H/O total knee replacement, right  Z96.651                      Assessment  Impairments: abnormal gait, abnormal or restricted ROM, activity intolerance, impaired balance, impaired physical strength, lacks appropriate home exercise program, pain with function, unable to perform ADL and activity limitations    Assessment details: PT notes the patient with decrease ROM and strength t/o the right knee and LE with demonstration of impaired gait pattern and balance secondary to degenerative changes t/o the bilateral hip joint so PT feels the patient is a good candidate for right TKA.  PT notes explanation of surgical procedure and p/o rehabilitation.  PT notes the patient would benefit from a course of p/o OPPT for 8 weeks with focus on gait/balance, manual therapy, strengthening, analgesic modalities, and HEP.    Understanding of Dx/Px/POC: good     Prognosis: good    Goals  ST.  Initiate HEP  2.  Decrease pain levels by 25-50%   3.  Increase ROM by 25-50%   4.  Increase strength by 1-2 mm grades  LT.  Improve gait pattern and balance to good/normal level with least restrictive AD  2.  Decrease limitations with standing, walking and stairs  3.  Decrease limitations with transfers and ADL  4.  Decrease limitations with squatting, lifting and carrying  5.  DC with HEP      Plan  Patient would benefit from: skilled physical therapy and PT eval  Planned modality interventions: cryotherapy    Planned therapy interventions: manual therapy, neuromuscular re-education, patient/caregiver education, self care, strengthening, stretching, therapeutic exercise, balance, balance/weight bearing training,  flexibility, gait training, graded exercise and home exercise program    Frequency: 2x week  Duration in weeks: 8  Treatment plan discussed with: patient  Plan details: PT notes review of POC and findings with the patient who is in agreement with PT recommendations of course of skilled therapy.         Subjective Evaluation    History of Present Illness  Mechanism of injury: Patient reports dealing with chronic bilateral knee pain for 10+ years secondary to degenerative changes t/o the knee joints.  Patient with trials of cortisone and joint supplement injections with minimal to no improvement in symptoms.  Patient is presently under the care of ortho MD who is recommending right joint replacement surgery.  Patient is tentatively scheduled for right TKA on 25 and is attending one session of pre-operative treatment for education and is scheduled for p/o sessions.  Patient reports limitations and difficulty with squatting, lifting, carrying, stairs, standing, balance, AD and transfers with his right knee symptoms.  Patient is retired with significant PMH of HTN, T2DM, and OA.    Patient Goals  Patient goals for therapy: decreased pain, improved balance, increased motion, increased strength and independence with ADLs/IADLs    Pain  Current pain ratin  At best pain rating: 3  At worst pain ratin  Location: Right Knee  Quality: sharp, dull ache and discomfort  Relieving factors: rest  Aggravating factors: stair climbing, walking, standing and lifting    Treatments  Previous treatment: injection treatment  Current treatment: injection treatment and physical therapy        Objective     Observations     Right Knee   Positive for deformity.     Additional Observation Details  PT notes bilateral knee genu varum position     Palpation     Right   Tenderness of the vastus lateralis and vastus medialis.     Tenderness     Right Knee   Tenderness in the patellar tendon and quadriceps tendon. No tenderness in the  lateral joint line, LCL (distal), LCL (proximal), MCL (distal), MCL (proximal) and medial joint line.     Neurological Testing     Reflexes   Left   Patellar (L4): trace (1+)  Achilles (S1): trace (1+)    Right   Patellar (L4): trace (1+)  Achilles (S1): trace (1+)    Active Range of Motion     Right Hip   Flexion: 51 degrees   Extension: 2 degrees   Abduction: WFL  External rotation (90/90): WFL  Internal rotation (90/90): WFL    Right Knee   Flexion: 101 degrees with pain  Extension: -7 degrees with pain    Additional Active Range of Motion Details  PT notes decrease ROM and strength t/o the right knee and LE     Passive Range of Motion     Right Hip   Flexion: 57 degrees   Extension: 3 degrees     Right Knee   Flexion: 104 degrees with pain  Extension: -3 degrees with pain    Mobility   Patellar Mobility:     Right Knee   Hypomobile: medial, lateral, superior and inferior     Strength/Myotome Testing     Right Hip   Planes of Motion   Flexion: 4  Extension: 4+  Abduction: 4  Adduction: 4+  External rotation: 4-  Internal rotation: 4-    Right Knee   Flexion: 4  Extension: 4  Quadriceps contraction: fair    Tests     Right Hip   Negative NATHAN and FADIR.   Chester: Positive.   Modified Chester: Positive.   90/90 SLR: Positive.   SLR: Positive.     Right Knee   Positive lateral Reina and medial Reina.   Negative anterior Lachman, posterior Lachman, valgus stress test at 0 degrees and varus stress test at 0 degrees.     Swelling     Left Knee Girth Measurement (cm)   Joint line: 40 cm    Right Knee Girth Measurement (cm)   Joint line: 42 cm    Ambulation     Observational Gait   Gait: antalgic   Decreased walking speed, stride length and right stance time.              Precautions:  PMH HTN, T2DM, OA  POC 2/22/25      Manuals 1/22       Right knee flex and ext         Right HS, hip ABD, quad, piriformis, and gastroc with manual hip traction                         Neuro Re-Ed        Front and Lateral Lunge          STS- No UE        Bridge with ABD         Rocker board- Tandem F/B and S/S         [facility] March                         Ther Ex        Nu-step for ROM and strength         Heel slides with strap        Quad set         Supine SLR         LAQ         F/L step up         Stair HR/TR         HEP update/review  15 min        Ther Activity                        Gait Training                        Modalities        CP  PRN

## 2025-01-22 ENCOUNTER — EVALUATION (OUTPATIENT)
Dept: PHYSICAL THERAPY | Facility: CLINIC | Age: 89
End: 2025-01-22
Payer: COMMERCIAL

## 2025-01-22 DIAGNOSIS — R26.9 GAIT ABNORMALITY: Primary | ICD-10-CM

## 2025-01-22 DIAGNOSIS — Z96.651 H/O TOTAL KNEE REPLACEMENT, RIGHT: ICD-10-CM

## 2025-01-22 DIAGNOSIS — M17.0 PRIMARY OSTEOARTHRITIS OF BOTH KNEES: ICD-10-CM

## 2025-01-22 PROCEDURE — 97162 PT EVAL MOD COMPLEX 30 MIN: CPT | Performed by: PHYSICAL THERAPIST

## 2025-01-22 PROCEDURE — 97535 SELF CARE MNGMENT TRAINING: CPT | Performed by: PHYSICAL THERAPIST

## 2025-01-23 NOTE — PROGRESS NOTES
Internal Medicine Pre-Operative Evaluation:     Reason for Visit: Pre-operative Evaluation for Risk Stratification and Optimization    Patient ID: Toni Mahmood is a 89 y.o. male.       The Patient is located at Home and in the following state in which I hold an active license [place]    The patient was identified by name and date of birth Toni Mahmood was informed that this is a telemedicine visit and that the visit is being conducted through the Epic Embedded platform. He agrees to proceed..  My office door was closed and no one else was in the room.  Patient acknowledged consent and understanding of privacy and security of the video platform. The patient has agreed to participate and understands they can discontinue the visit at any time.    Patient is aware this is a billable service.         Internal Medicine Pre-Operative Evaluation:     Reason for Visit: Pre-operative Evaluation for Risk Stratification and Optimization    Patient ID: Toni Mahmood is a 89 y.o. male.     Case: [id number] Date/Time: 01/28/25 0730   Procedure: ARTHROPLASTY KNEE TOTAL (Right: Knee)   Anesthesia type: Choice   Diagnosis: Primary osteoarthritis of both knees [M17.0]   Pre-op diagnosis: Primary osteoarthritis of both knees [M17.0]   Location:  OR ROOM 02 / [facility]   Surgeons: Brandon Bunn DO         Recommendations to Proceed withSurgery    Patient is considered to be Low risk for Medium risk procedure.     After evaluation and discussion with patient with emphasis that all surgery has some degree of inherent risk it is acknowledged by patient this risk is Acceptable.    Patient is optimized and may proceed with planned procedure.     Assessment    Pre-operative Medical Evaluation for planned surgery  Recommendations as listed in PLAN section below  Contact surgical nurse  navigator with any questions regarding preoperative plan or schedule.      Assessment & Plan  Primary osteoarthritis of  right knee  Failed outpatient conservative measures  Elected to undergo total joint arthroplasty    Preop exam for internal medicine        Essential hypertension  Continue amlodipine  Hold ACEI/ARB/diuretics to decrease risk of post operative LY; post op add hydralazine prn for elevated blood pressures. May resume these meds upon discharge    Benign prostatic hyperplasia without urinary obstruction  At risk for POUR- monitor           Plan:     1. Further preoperative workup as follows:   - none no further testing required may proceed with surgery    2. Preoperative Medication Management Review performed by PAT nursing  YES    3. Patient requires further consultation with:   No Consults Required    4. Discharge Planning / Barriers to Discharge  none identified - patients has post discharge therapy plan in place, transportation arranged for discharge day, adequate family support at home to assist with discharge to home.        Subjective:           History of Present Illness:     Toni Mahmood is a 89 y.o. male who presents today for a preoperative consultation at the request of surgeon. The patient understands this is an elective procedure and not emergent. They are electing to undergo planned procedure with an understanding that all surgery has inherent risk. They have worked with their surgeon and failed conservative treatment measures. Today they present for preoperative risk assessment and recommendations for optimization in preparation for surgery.        Pt seen for upcoming proposed surgery. They have failed previous conservative measures and have elected surgical intervention.     Pt meets presurgical lab and BMI optimization goals.      Toni Mahmood has an IN HOSPITAL cardiac risk of RCI RISK CLASS I (0 risk factors, risk of major cardiac compl. appr. 0.5%) based on RCRI calculator    Cardiac Risk Estimation: per the Revised Cardiac Risk Index (Circ. 100:1043, 1999),         Pre-op  Exam    Pre-operative Risk Factors:    History of cerebrovascular disease: No    History of ischemic heart disease: No  Pre-operative treatment with insulin: No  Pre-operative creatinine >2 mg/dL: No    History of congestive heart failure: No        ROS: No TIA's or unusual headaches, no dysphagia.  No prolonged cough. No dyspnea or chest pain on exertion.  No abdominal pain, change in bowel habits, black or bloody stools.  No urinary tract or BPH symptoms.  Positive reported pain in arthritic joint. Positive difficulty with gait. No skin rashes or issues.            The following portions of the patient's history were reviewed and updated as appropriate: allergies, current medications, past family history, past medical history, past social history, past surgical history and problem list.     Past History:       Past Medical History:   Diagnosis Date    Diabetes mellitus (HCC)     Glaucoma     High cholesterol     Past Surgical History:   Procedure Laterality Date    CATARACT EXTRACTION            Social History     Tobacco Use    Smoking status: Never    Smokeless tobacco: Never   Vaping Use    Vaping status: Never Used   Substance Use Topics    Alcohol use: Not Currently    Drug use: Never     No family history on file.       Allergies:     No Known Allergies     Current Medications:     Current Outpatient Medications   Medication Instructions    amLODIPine (NORVASC) 2.5 mg, Every morning    ascorbic acid (VITAMIN C) 500 mg, Oral, 2 times daily    Aspirin Buf,CaCarb-MgCarb-MgO, 81 MG TABS Take by mouth    cholecalciferol (VITAMIN D3) 1,000 Units, Oral, Daily    ferrous sulfate 325 mg, Daily with breakfast    folic acid (FOLVITE) 1 mg, Oral, Daily    furosemide (LASIX) 20 mg, Daily    latanoprost (XALATAN) 0.005 % ophthalmic solution No dose, route, or frequency recorded.    lisinopril (ZESTRIL) 20 mg tablet No dose, route, or frequency recorded.    metFORMIN (GLUCOPHAGE) 500 mg tablet TAKE 1 TABLET BY MOUTH TWICE  "DAILY WITH MORNING MEAL AND WITH EVENING MEAL    Multiple Vitamins-Minerals (multivitamin with minerals) tablet 1 tablet, Oral, Daily    mupirocin (BACTROBAN) 2 % ointment Apply topically to the inside of the left and right nostrils twice daily for 5 days before surgery, including the morning of surgery.    potassium chloride (MICRO-K) 10 MEQ CR capsule 10 mEq, Daily    timolol (TIMOPTIC) 0.5 % ophthalmic solution No dose, route, or frequency recorded.    zinc sulfate (ZINCATE) 220 mg, Oral, Daily           PRE-OP WORKSHEET DATA    Assessment of Pre-Operative Risks     MLJ Quality Hard Stops:    BMI (<40) : Estimated body mass index is 31.07 kg/m² as calculated from the following:    Height as of 1/8/25: 5' 7\" (1.702 m).    Weight as of 1/8/25: 90 kg (198 lb 6.4 oz).    Hgb ( >11):   Lab Results   Component Value Date    HGB 12.5 01/09/2025       HbA1c (<7.5) :   Lab Results   Component Value Date    HGBA1C 7.4 (H) 01/09/2025       GFR (>60) (Less then 45 = Nephrology consult):    Lab Results   Component Value Date    EGFR 77 01/09/2025            Pre-Op Data Reviewed:       Laboratory Results: I have personally reviewed the pertinent reports    EKG: I personally reviewed and interpreted available tracings in the electronic medical record    Encounter Date: 01/09/25   ECG 12 lead   Result Value    Ventricular Rate 61    Atrial Rate 61    WY Interval 286    QRSD Interval 128    QT Interval 454    QTC Interval 457    P Axis     QRS Axis -55    T Wave Axis -18    Narrative    Sinus rhythm with sinus arrhythmia with 1st degree A-V block  Right bundle branch block  Left anterior fascicular block   Bifascicular block   Minimal voltage criteria for LVH, may be normal variant ( R in aVL )  Abnormal ECG  No previous ECGs available  Confirmed by Nagi Oro ([id number]) on 1/10/2025 9:31:11 AM       OLD RECORDS: reviewed old records in the chart review section if EHR on day of visit.    Previous cardiopulmonary studies within " the past year:  Echocardiogram: yes   Lab Results   Component Value Date    LVEF 74 05/14/2024     Cardiac Catheterization: no  Stress Test: no      Time of visit including pre-visit chart review, visit and post-visit coordination of plan and care , review of pre-surgical lab work, preparation and time spent documenting note in electronic medical record, time spent face-to-face in physical examination answering patient questions by care team 35 minutes             [facility]

## 2025-01-24 ENCOUNTER — OFFICE VISIT (OUTPATIENT)
Age: 89
End: 2025-01-24
Payer: COMMERCIAL

## 2025-01-24 DIAGNOSIS — M17.0 PRIMARY OSTEOARTHRITIS OF BOTH KNEES: ICD-10-CM

## 2025-01-24 PROCEDURE — 99205 OFFICE O/P NEW HI 60 MIN: CPT | Performed by: INTERNAL MEDICINE

## 2025-01-24 NOTE — PATIENT INSTRUCTIONS
BEFORE SURGERY    Contact your surgical nurse navigator or surgical provider with any questions regarding preoperative plan or schedule.  Stop all over the counter supplements, herbal, naturopathic  medications for 1 week prior to surgery UNLESS prescribed by your surgeon  Hold NSAIDS (i.e. advil, alleve, motrin, ibuprofen, celebrex) minimum 5 days prior to surgery  Follow presurgical medication instructions provided by preadmission nursing team reviewed during your presurgery phone call  Strategies for optimizing your surgery through breathing exercises, nutrition and physical activity can be found at [email or web address]/best  Call [phone] with any presurgical concerns or medications questions or use the messaging feature in your [facility] to contact your provider    AFTER SURGERY    Recommend using Tylenol ( acetaminophen ) 1000 mg every eight hours during the first week post discharge along with icing the area for 20 mins every 3-4 hours while awake can be helpful in reducing your need for post operative opioid use. This opioid sparing plan can be used along side your surgeons pain plan.  Use stool softener over the counter (colace) daily after surgery during the first 1-2 weeks to avoid post operative constipation issues  If no bowel movement within 3 days after surgery then use over the counter Miralax in addition to your stool softener   If cleared by your surgical team for activity then early and often walking is encouraged and can be important in prevention of post surgical blood clots. Additionally spend as much time out of bed as possible and allowed by your surgical team  Use your incentive spirometer twice per hour in the first seven days after surgery to help prevent post surgery lung complications and infections  It is very important you follow the instructions from your surgeon regarding any medications for after surgery blood clot prevention. Compliance with these medications or interventions is very  important.  Call [phone] with any post discharge concerns or medical issues or use the messaging feature in your [employer] juani to contact your provider

## 2025-01-27 NOTE — ANESTHESIA PREPROCEDURE EVALUATION
Procedure:  ARTHROPLASTY KNEE TOTAL (Right: Knee)    Relevant Problems   CARDIO   (+) Essential hypertension      ENDO   (+) Type 2 diabetes mellitus (HCC)      /RENAL   (+) Benign prostatic hyperplasia without urinary obstruction      MUSCULOSKELETAL   (+) Primary osteoarthritis of right knee      Left Ventricle: Left ventricular cavity size is normal. There is mild to moderate concentric hypertrophy. The left ventricular ejection fraction is 74% by biplane measurement. Systolic function is hyperdynamic. Although no diagnostic regional wall motion abnormality was identified, this possibility cannot be completely excluded on the basis of this study. Diastolic function is mildly abnormal, consistent with grade I (abnormal) relaxation.   Physical Exam    Airway    Mallampati score: II  TM Distance: >3 FB  Neck ROM: full     Dental   No notable dental hx     Cardiovascular  Cardiovascular exam normal    Pulmonary  Pulmonary exam normal     Other Findings        Anesthesia Plan  ASA Score- 3     Anesthesia Type- spinal with ASA Monitors.         Additional Monitors:     Airway Plan:     Comment: Adductor canal.       Plan Factors-Exercise tolerance (METS): >4 METS.    Chart reviewed. EKG reviewed. Imaging results reviewed. Existing labs reviewed. Patient summary reviewed.    Patient is not a current smoker.      Obstructive sleep apnea risk education given perioperatively.        Induction- intravenous.    Postoperative Plan-     Perioperative Resuscitation Plan - Level 1 - Full Code.       Informed Consent-         Sinus rhythm with sinus arrhythmia with 1st degree A-V block  Right bundle branch block  Left anterior fascicular block  Minimal voltage criteria for LVH, may be normal variant ( R in aVL )  Abnormal ECG   O Status:  No vitals data found for the desired time range.

## 2025-01-28 ENCOUNTER — HOSPITAL ENCOUNTER (OUTPATIENT)
Facility: HOSPITAL | Age: 89
Setting detail: OUTPATIENT SURGERY
Discharge: HOME | End: 2025-01-29
Attending: ORTHOPAEDIC SURGERY | Admitting: ORTHOPAEDIC SURGERY
Payer: COMMERCIAL

## 2025-01-28 ENCOUNTER — ANESTHESIA (OUTPATIENT)
Dept: PERIOP | Facility: HOSPITAL | Age: 89
End: 2025-01-28
Payer: COMMERCIAL

## 2025-01-28 DIAGNOSIS — I95.81 POSTPROCEDURAL HYPOTENSION: ICD-10-CM

## 2025-01-28 DIAGNOSIS — M17.11 PRIMARY OSTEOARTHRITIS OF RIGHT KNEE: Primary | ICD-10-CM

## 2025-01-28 LAB
GLUCOSE SERPL-MCNC: 147 MG/DL (ref 65–140)
GLUCOSE SERPL-MCNC: 191 MG/DL (ref 65–140)
GLUCOSE SERPL-MCNC: 77 MG/DL (ref 65–140)

## 2025-01-28 PROCEDURE — 82948 REAGENT STRIP/BLOOD GLUCOSE: CPT

## 2025-01-28 PROCEDURE — 99222 1ST HOSP IP/OBS MODERATE 55: CPT | Performed by: INTERNAL MEDICINE

## 2025-01-28 PROCEDURE — C1776 JOINT DEVICE (IMPLANTABLE): HCPCS | Performed by: ORTHOPAEDIC SURGERY

## 2025-01-28 PROCEDURE — 97535 SELF CARE MNGMENT TRAINING: CPT

## 2025-01-28 PROCEDURE — 97530 THERAPEUTIC ACTIVITIES: CPT

## 2025-01-28 PROCEDURE — 97163 PT EVAL HIGH COMPLEX 45 MIN: CPT

## 2025-01-28 PROCEDURE — 97116 GAIT TRAINING THERAPY: CPT

## 2025-01-28 PROCEDURE — 27447 TOTAL KNEE ARTHROPLASTY: CPT | Performed by: PHYSICIAN ASSISTANT

## 2025-01-28 PROCEDURE — 97167 OT EVAL HIGH COMPLEX 60 MIN: CPT

## 2025-01-28 PROCEDURE — 27447 TOTAL KNEE ARTHROPLASTY: CPT | Performed by: ORTHOPAEDIC SURGERY

## 2025-01-28 PROCEDURE — C1713 ANCHOR/SCREW BN/BN,TIS/BN: HCPCS | Performed by: ORTHOPAEDIC SURGERY

## 2025-01-28 DEVICE — SMARTSET HIGH PERFORMANCE MV MEDIUM VISCOSITY BONE CEMENT 40G
Type: IMPLANTABLE DEVICE | Status: FUNCTIONAL
Brand: SMARTSET

## 2025-01-28 DEVICE — ATTUNE KNEE SYSTEM TIBIAL BASE FIXED BEARING SIZE 7 CEMENTED
Type: IMPLANTABLE DEVICE | Status: FUNCTIONAL
Brand: ATTUNE

## 2025-01-28 DEVICE — ATTUNE PATELLA MEDIALIZED DOME 38MM CEMENTED AOX
Type: IMPLANTABLE DEVICE | Status: FUNCTIONAL
Brand: ATTUNE

## 2025-01-28 DEVICE — ATTUNE KNEE SYSTEM TIBIAL INSERT FIXED BEARING MEDIAL STABILIZED RIGHT AOX 8, 6MM
Type: IMPLANTABLE DEVICE | Site: KNEE | Status: FUNCTIONAL
Brand: ATTUNE

## 2025-01-28 DEVICE — ATTUNE KNEE SYSTEM FEMORAL CRUCIATE RETAINING SIZE 8 RIGHT CEMENTED
Type: IMPLANTABLE DEVICE | Status: FUNCTIONAL
Brand: ATTUNE

## 2025-01-28 RX ORDER — FOLIC ACID 1 MG/1
1 TABLET ORAL DAILY
Status: DISCONTINUED | OUTPATIENT
Start: 2025-01-29 | End: 2025-01-29 | Stop reason: HOSPADM

## 2025-01-28 RX ORDER — OXYCODONE HYDROCHLORIDE 10 MG/1
10 TABLET ORAL EVERY 6 HOURS PRN
Status: DISCONTINUED | OUTPATIENT
Start: 2025-01-28 | End: 2025-01-29 | Stop reason: HOSPADM

## 2025-01-28 RX ORDER — GLYCOPYRROLATE 0.2 MG/ML
INJECTION INTRAMUSCULAR; INTRAVENOUS AS NEEDED
Status: DISCONTINUED | OUTPATIENT
Start: 2025-01-28 | End: 2025-01-28

## 2025-01-28 RX ORDER — ACETAMINOPHEN 10 MG/ML
1000 INJECTION, SOLUTION INTRAVENOUS ONCE
Status: COMPLETED | OUTPATIENT
Start: 2025-01-28 | End: 2025-01-28

## 2025-01-28 RX ORDER — LISINOPRIL 5 MG/1
5 TABLET ORAL DAILY
Status: DISCONTINUED | OUTPATIENT
Start: 2025-01-29 | End: 2025-01-29 | Stop reason: HOSPADM

## 2025-01-28 RX ORDER — OXYCODONE HYDROCHLORIDE 5 MG/1
5 TABLET ORAL EVERY 4 HOURS PRN
Qty: 18 TABLET | Refills: 0 | Status: SHIPPED | OUTPATIENT
Start: 2025-01-28 | End: 2025-01-31

## 2025-01-28 RX ORDER — AMLODIPINE BESYLATE 5 MG/1
5 TABLET ORAL DAILY
Status: DISCONTINUED | OUTPATIENT
Start: 2025-01-29 | End: 2025-01-29 | Stop reason: HOSPADM

## 2025-01-28 RX ORDER — ENOXAPARIN SODIUM 100 MG/ML
40 INJECTION SUBCUTANEOUS DAILY
Status: COMPLETED | OUTPATIENT
Start: 2025-01-28 | End: 2025-01-28

## 2025-01-28 RX ORDER — PROPOFOL 10 MG/ML
INJECTION, EMULSION INTRAVENOUS CONTINUOUS PRN
Status: DISCONTINUED | OUTPATIENT
Start: 2025-01-28 | End: 2025-01-28

## 2025-01-28 RX ORDER — HYDRALAZINE HYDROCHLORIDE 20 MG/ML
10 INJECTION INTRAMUSCULAR; INTRAVENOUS ONCE
Status: COMPLETED | OUTPATIENT
Start: 2025-01-28 | End: 2025-01-28

## 2025-01-28 RX ORDER — BUPIVACAINE HYDROCHLORIDE 7.5 MG/ML
INJECTION, SOLUTION INTRASPINAL AS NEEDED
Status: DISCONTINUED | OUTPATIENT
Start: 2025-01-28 | End: 2025-01-28

## 2025-01-28 RX ORDER — ASCORBIC ACID 500 MG
500 TABLET ORAL 2 TIMES DAILY
Status: DISCONTINUED | OUTPATIENT
Start: 2025-01-28 | End: 2025-01-29 | Stop reason: HOSPADM

## 2025-01-28 RX ORDER — SODIUM CHLORIDE, SODIUM LACTATE, POTASSIUM CHLORIDE, CALCIUM CHLORIDE 600; 310; 30; 20 MG/100ML; MG/100ML; MG/100ML; MG/100ML
125 INJECTION, SOLUTION INTRAVENOUS CONTINUOUS
Status: DISCONTINUED | OUTPATIENT
Start: 2025-01-28 | End: 2025-01-29 | Stop reason: HOSPADM

## 2025-01-28 RX ORDER — BUPIVACAINE HYDROCHLORIDE AND EPINEPHRINE 2.5; 5 MG/ML; UG/ML
INJECTION, SOLUTION EPIDURAL; INFILTRATION; INTRACAUDAL; PERINEURAL AS NEEDED
Status: DISCONTINUED | OUTPATIENT
Start: 2025-01-28 | End: 2025-01-28 | Stop reason: HOSPADM

## 2025-01-28 RX ORDER — CHLORHEXIDINE GLUCONATE ORAL RINSE 1.2 MG/ML
15 SOLUTION DENTAL ONCE
Status: COMPLETED | OUTPATIENT
Start: 2025-01-28 | End: 2025-01-28

## 2025-01-28 RX ORDER — KETOROLAC TROMETHAMINE 30 MG/ML
INJECTION, SOLUTION INTRAMUSCULAR; INTRAVENOUS AS NEEDED
Status: DISCONTINUED | OUTPATIENT
Start: 2025-01-28 | End: 2025-01-28 | Stop reason: HOSPADM

## 2025-01-28 RX ORDER — HYDROMORPHONE HCL/PF 1 MG/ML
0.5 SYRINGE (ML) INJECTION
Status: DISCONTINUED | OUTPATIENT
Start: 2025-01-28 | End: 2025-01-28 | Stop reason: HOSPADM

## 2025-01-28 RX ORDER — BUPIVACAINE HYDROCHLORIDE 2.5 MG/ML
INJECTION, SOLUTION EPIDURAL; INFILTRATION; INTRACAUDAL
Status: COMPLETED | OUTPATIENT
Start: 2025-01-28 | End: 2025-01-28

## 2025-01-28 RX ORDER — ONDANSETRON 2 MG/ML
INJECTION INTRAMUSCULAR; INTRAVENOUS AS NEEDED
Status: DISCONTINUED | OUTPATIENT
Start: 2025-01-28 | End: 2025-01-28

## 2025-01-28 RX ORDER — MORPHINE SULFATE 10 MG/ML
INJECTION, SOLUTION INTRAMUSCULAR; INTRAVENOUS AS NEEDED
Status: DISCONTINUED | OUTPATIENT
Start: 2025-01-28 | End: 2025-01-28 | Stop reason: HOSPADM

## 2025-01-28 RX ORDER — INSULIN LISPRO 100 [IU]/ML
1-5 INJECTION, SOLUTION INTRAVENOUS; SUBCUTANEOUS
Status: DISCONTINUED | OUTPATIENT
Start: 2025-01-29 | End: 2025-01-29 | Stop reason: HOSPADM

## 2025-01-28 RX ORDER — FERROUS SULFATE 325(65) MG
325 TABLET ORAL
Status: DISCONTINUED | OUTPATIENT
Start: 2025-01-29 | End: 2025-01-29 | Stop reason: HOSPADM

## 2025-01-28 RX ORDER — CEFAZOLIN SODIUM 2 G/50ML
2000 SOLUTION INTRAVENOUS ONCE
Status: COMPLETED | OUTPATIENT
Start: 2025-01-28 | End: 2025-01-28

## 2025-01-28 RX ORDER — CHLORHEXIDINE GLUCONATE 40 MG/ML
SOLUTION TOPICAL DAILY PRN
Status: DISCONTINUED | OUTPATIENT
Start: 2025-01-28 | End: 2025-01-28

## 2025-01-28 RX ORDER — DOCUSATE SODIUM 100 MG/1
100 CAPSULE, LIQUID FILLED ORAL 2 TIMES DAILY
Status: DISCONTINUED | OUTPATIENT
Start: 2025-01-28 | End: 2025-01-29 | Stop reason: HOSPADM

## 2025-01-28 RX ORDER — ACETAMINOPHEN 325 MG/1
975 TABLET ORAL EVERY 8 HOURS SCHEDULED
Status: DISCONTINUED | OUTPATIENT
Start: 2025-01-28 | End: 2025-01-29 | Stop reason: HOSPADM

## 2025-01-28 RX ORDER — POTASSIUM CHLORIDE 20MEQ/15ML
20 LIQUID (ML) ORAL ONCE
Status: COMPLETED | OUTPATIENT
Start: 2025-01-28 | End: 2025-01-28

## 2025-01-28 RX ORDER — MAGNESIUM HYDROXIDE 1200 MG/15ML
LIQUID ORAL AS NEEDED
Status: DISCONTINUED | OUTPATIENT
Start: 2025-01-28 | End: 2025-01-28 | Stop reason: HOSPADM

## 2025-01-28 RX ORDER — MIDAZOLAM HYDROCHLORIDE 2 MG/2ML
INJECTION, SOLUTION INTRAMUSCULAR; INTRAVENOUS AS NEEDED
Status: DISCONTINUED | OUTPATIENT
Start: 2025-01-28 | End: 2025-01-28

## 2025-01-28 RX ORDER — ACETAMINOPHEN 500 MG
500 TABLET ORAL EVERY 8 HOURS
Qty: 30 TABLET | Refills: 1 | Status: SHIPPED | OUTPATIENT
Start: 2025-01-28

## 2025-01-28 RX ORDER — FUROSEMIDE 20 MG/1
20 TABLET ORAL DAILY
Status: DISCONTINUED | OUTPATIENT
Start: 2025-01-29 | End: 2025-01-29 | Stop reason: HOSPADM

## 2025-01-28 RX ORDER — HYDRALAZINE HYDROCHLORIDE 20 MG/ML
5 INJECTION INTRAMUSCULAR; INTRAVENOUS EVERY 6 HOURS PRN
Status: DISCONTINUED | OUTPATIENT
Start: 2025-01-28 | End: 2025-01-29

## 2025-01-28 RX ORDER — ASPIRIN 325 MG
325 TABLET ORAL EVERY 12 HOURS
Qty: 60 TABLET | Refills: 0 | Status: SHIPPED | OUTPATIENT
Start: 2025-01-28

## 2025-01-28 RX ORDER — SODIUM CHLORIDE, SODIUM LACTATE, POTASSIUM CHLORIDE, CALCIUM CHLORIDE 600; 310; 30; 20 MG/100ML; MG/100ML; MG/100ML; MG/100ML
INJECTION, SOLUTION INTRAVENOUS CONTINUOUS PRN
Status: DISCONTINUED | OUTPATIENT
Start: 2025-01-28 | End: 2025-01-28

## 2025-01-28 RX ORDER — OXYCODONE HYDROCHLORIDE 5 MG/1
5 TABLET ORAL EVERY 6 HOURS PRN
Status: DISCONTINUED | OUTPATIENT
Start: 2025-01-28 | End: 2025-01-29 | Stop reason: HOSPADM

## 2025-01-28 RX ORDER — ZINC SULFATE 50(220)MG
220 CAPSULE ORAL DAILY
Status: DISCONTINUED | OUTPATIENT
Start: 2025-01-29 | End: 2025-01-29 | Stop reason: HOSPADM

## 2025-01-28 RX ORDER — CEFAZOLIN SODIUM 2 G/50ML
2000 SOLUTION INTRAVENOUS EVERY 8 HOURS
Status: COMPLETED | OUTPATIENT
Start: 2025-01-28 | End: 2025-01-29

## 2025-01-28 RX ORDER — LATANOPROST 50 UG/ML
1 SOLUTION/ DROPS OPHTHALMIC
Status: DISCONTINUED | OUTPATIENT
Start: 2025-01-28 | End: 2025-01-29 | Stop reason: HOSPADM

## 2025-01-28 RX ORDER — SODIUM CHLORIDE, SODIUM LACTATE, POTASSIUM CHLORIDE, CALCIUM CHLORIDE 600; 310; 30; 20 MG/100ML; MG/100ML; MG/100ML; MG/100ML
125 INJECTION, SOLUTION INTRAVENOUS CONTINUOUS
Status: DISCONTINUED | OUTPATIENT
Start: 2025-01-28 | End: 2025-01-28

## 2025-01-28 RX ORDER — FENTANYL CITRATE 50 UG/ML
INJECTION, SOLUTION INTRAMUSCULAR; INTRAVENOUS AS NEEDED
Status: DISCONTINUED | OUTPATIENT
Start: 2025-01-28 | End: 2025-01-28

## 2025-01-28 RX ORDER — TIMOLOL MALEATE 5 MG/ML
1 SOLUTION/ DROPS OPHTHALMIC DAILY
Status: DISCONTINUED | OUTPATIENT
Start: 2025-01-29 | End: 2025-01-29 | Stop reason: HOSPADM

## 2025-01-28 RX ORDER — TRANEXAMIC ACID 10 MG/ML
1000 INJECTION, SOLUTION INTRAVENOUS ONCE
Status: COMPLETED | OUTPATIENT
Start: 2025-01-28 | End: 2025-01-28

## 2025-01-28 RX ADMIN — PHENYLEPHRINE HYDROCHLORIDE 20 MCG/MIN: 10 INJECTION INTRAVENOUS at 08:08

## 2025-01-28 RX ADMIN — BUPIVACAINE 10 ML: 13.3 INJECTION, SUSPENSION, LIPOSOMAL INFILTRATION at 07:20

## 2025-01-28 RX ADMIN — SODIUM CHLORIDE, SODIUM LACTATE, POTASSIUM CHLORIDE, AND CALCIUM CHLORIDE: .6; .31; .03; .02 INJECTION, SOLUTION INTRAVENOUS at 08:36

## 2025-01-28 RX ADMIN — CEFAZOLIN SODIUM 2000 MG: 2 SOLUTION INTRAVENOUS at 14:19

## 2025-01-28 RX ADMIN — SODIUM CHLORIDE, SODIUM LACTATE, POTASSIUM CHLORIDE, AND CALCIUM CHLORIDE 125 ML/HR: .6; .31; .03; .02 INJECTION, SOLUTION INTRAVENOUS at 06:50

## 2025-01-28 RX ADMIN — ONDANSETRON 4 MG: 2 INJECTION INTRAMUSCULAR; INTRAVENOUS at 07:42

## 2025-01-28 RX ADMIN — ENOXAPARIN SODIUM 40 MG: 40 INJECTION SUBCUTANEOUS at 20:45

## 2025-01-28 RX ADMIN — LATANOPROST 1 DROP: 50 SOLUTION OPHTHALMIC at 22:39

## 2025-01-28 RX ADMIN — MIDAZOLAM 1 MG: 1 INJECTION INTRAMUSCULAR; INTRAVENOUS at 07:42

## 2025-01-28 RX ADMIN — TRANEXAMIC ACID 1000 MG: 10 INJECTION, SOLUTION INTRAVENOUS at 07:58

## 2025-01-28 RX ADMIN — CEFAZOLIN SODIUM 2000 MG: 2 SOLUTION INTRAVENOUS at 22:35

## 2025-01-28 RX ADMIN — ACETAMINOPHEN 325MG 975 MG: 325 TABLET ORAL at 17:39

## 2025-01-28 RX ADMIN — FENTANYL CITRATE 25 MCG: 50 INJECTION INTRAMUSCULAR; INTRAVENOUS at 07:42

## 2025-01-28 RX ADMIN — SODIUM CHLORIDE, SODIUM LACTATE, POTASSIUM CHLORIDE, AND CALCIUM CHLORIDE 125 ML/HR: .6; .31; .03; .02 INJECTION, SOLUTION INTRAVENOUS at 19:04

## 2025-01-28 RX ADMIN — CEFAZOLIN SODIUM 2000 MG: 2 SOLUTION INTRAVENOUS at 07:40

## 2025-01-28 RX ADMIN — BUPIVACAINE HYDROCHLORIDE 10 ML: 2.5 INJECTION, SOLUTION EPIDURAL; INFILTRATION; INTRACAUDAL at 07:25

## 2025-01-28 RX ADMIN — BUPIVACAINE HYDROCHLORIDE IN DEXTROSE 1.5 ML: 7.5 INJECTION, SOLUTION SUBARACHNOID at 07:51

## 2025-01-28 RX ADMIN — POTASSIUM CHLORIDE 20 MEQ: 20 SOLUTION ORAL at 19:04

## 2025-01-28 RX ADMIN — ACETAMINOPHEN 1000 MG: 1000 INJECTION INTRAVENOUS at 09:37

## 2025-01-28 RX ADMIN — BUPIVACAINE HYDROCHLORIDE 20 ML: 2.5 INJECTION, SOLUTION EPIDURAL; INFILTRATION; INTRACAUDAL; PERINEURAL at 07:20

## 2025-01-28 RX ADMIN — SODIUM CHLORIDE, SODIUM LACTATE, POTASSIUM CHLORIDE, AND CALCIUM CHLORIDE: .6; .31; .03; .02 INJECTION, SOLUTION INTRAVENOUS at 07:40

## 2025-01-28 RX ADMIN — OXYCODONE HYDROCHLORIDE AND ACETAMINOPHEN 500 MG: 500 TABLET ORAL at 19:04

## 2025-01-28 RX ADMIN — DOCUSATE SODIUM 100 MG: 100 CAPSULE, LIQUID FILLED ORAL at 19:04

## 2025-01-28 RX ADMIN — HYDRALAZINE HYDROCHLORIDE 10 MG: 20 INJECTION INTRAMUSCULAR; INTRAVENOUS at 12:10

## 2025-01-28 RX ADMIN — PROPOFOL 50 MCG/KG/MIN: 10 INJECTION, EMULSION INTRAVENOUS at 07:54

## 2025-01-28 RX ADMIN — CHLORHEXIDINE GLUCONATE 0.12% ORAL RINSE 15 ML: 1.2 LIQUID ORAL at 06:49

## 2025-01-28 RX ADMIN — GLYCOPYRROLATE 0.2 MG: 0.2 INJECTION, SOLUTION INTRAMUSCULAR; INTRAVENOUS at 08:09

## 2025-01-28 NOTE — ANESTHESIA POSTPROCEDURE EVALUATION
Post-Op Assessment Note    CV Status:  Stable    Pain management: adequate       Mental Status:  Alert and awake   Hydration Status:  Euvolemic   PONV Controlled:  Controlled   Airway Patency:  Patent  Two or more mitigation strategies used for obstructive sleep apnea. There is a medical reason for not screening for obstructive sleep apnea and/or for not using two or more mitigation strategies   Post Op Vitals Reviewed: Yes    No anethesia notable event occurred.    Staff: Anesthesiologist       Last Filed PACU Vitals:  Vitals Value Taken Time   Temp 97.6 °F (36.4 °C) 01/28/25 1122   Pulse 61 01/28/25 1122   /67 01/28/25 1122   Resp 22 01/28/25 1122   SpO2 92 % 01/28/25 1122       Modified Shruthi:     Vitals Value Taken Time   Activity 2 01/28/25 1122   Respiration 2 01/28/25 1122   Circulation 2 01/28/25 1122   Consciousness 2 01/28/25 1122   Oxygen Saturation 2 01/28/25 1122     Modified Shruthi Score: 10

## 2025-01-28 NOTE — INTERVAL H&P NOTE
H&P reviewed. After examining the patient I find no changes in the patients condition since the H&P had been written.    Vitals:    01/28/25 0637   BP: (!) 196/84   Pulse: 69   Resp: 18   Temp: 98.5 °F (36.9 °C)   SpO2: 96%

## 2025-01-28 NOTE — PROGRESS NOTES
Pt to be admitted per Dr. Bunn. Pt currently on 2L O2 from 3L with SpO2 100% (baseline room air). Pt SBP improved and is in 110s-120s. Pt more awake, pain stable, and pt no longer pale. Family at bedside and aware of admission. Bed request entered and nursing supervisor aware.

## 2025-01-28 NOTE — OP NOTE
OPERATIVE REPORT  PATIENT NAME: Toni Mahmood  : 1935  MRN: [id number]  Pt Location:  [facility] OR ROOM 02    Surgery Date: 2025    Surgeons and Role:     * Brandon Bunn DO - Primary     * Jeevan Vivas PA-C - Assisting     Preop Diagnosis:  Primary osteoarthritis of both knees M17.0    Post-Op Diagnosis Codes:     * Primary osteoarthritis of both knees [M17.0]    Procedure(s):  Right - ARTHROPLASTY KNEE TOTAL    Fluids: 1350 cc    Estimated Blood Loss:   150 cc    Anesthesia Type:   Spinal with iPACK block and supplemental local utilizing combination of Marcaine, Toradol and morphine    Tourniquet time: 77 minutes at 300 mmHg    Operative Indications:  Primary osteoarthritis of both knees M17.0  Clark is an 89-year-old male with bilateral knee pain and degenerative disease.  He has failed to respond to conservative measures.  After thorough discussion of the risk, benefits, options and alternatives of treatment he elected to proceed with right knee replacement arthroplasty.  Operative Findings:  At the time of the procedure the patient was noted to have advanced degenerative disease of the right knee.  Knee replacement was performed utilizing the [employer] system with a size 8 femoral component, size 7 tibial baseplate with 6 mm articular surface and a 38 mm all poly patella.  At the conclusion of the procedure the patient had full extension and flexion to 130 degrees with stable collateral ligament testing and mid range flexion and full extension.  The patella was noted to track centrally in the trochlear groove.    Complications:   None      Knee Approach: Medial Parapatellar        Procedure and Technique:  Clark was taken to the operating room and administered a spinal anesthetic after administration of a peripheral nerve block in the holding area. Well-padded tourniquet was applied to the right lower extremity. A time-out was performed and preoperative antibiotics were administered. The right  lower extremity was then prepped and draped in standard fashion. The limb was elevated, exsanguinated with an Esmarch bandage and the tourniquet then inflated to 300 mm of mercury.  A midline incision was made with sharp dissection carried down through the skin and subcutaneous tissues. Bleeding was controlled with cautery. Soft tissues were dissected down through the subcutaneous tissues and medial and lateral skin flaps elevated. A medial parapatellar arthrotomy was then performed in routine fashion. Synovectomy was performed and the proximal tibia was exposed subperiosteally, meniscal remnants and ACL remnants excised. Osteophytes were removed. The patella was then everted. The distal femur was accessed in standard fashion with the drill and the guide for distal femoral cut placed and secured. The distal femoral cut was then performed in standard fashion. The sizing guide was then placed, appropriately positioned and a size 8 cutting guide chosen. Anterior, posterior, chamfer and trochlear cuts were then performed in standard fashion. The trial femoral component was placed over the cut surface and noted to fit nicely.  The extramedullary guide was used for the tibial cut. This was position for all aspects of the cut and then secured in position. The proximal tibial cut was then made in standard fashion. A size 7 trial seemed to fit best over the cut surface. A trial reduction was then performed utilizing the size 8 femoral component, the size 7 tibial base plate and a 6 mm insert. The knee was noted to have good range of motion with full extension and flexion beyond 120° with good collateral stability in full extension and mid range flexion.  The patella was calibrated and then the cutting guide secured to the patella. The patellar resection was then performed in routine fashion and a size 38 patellar trial placed over the cut surface, drilled and the patellar trial component placed. Once again, the knee was  placed through range of motion. The patella was noted to track nicely in the trochlear groove. All trial components were then removed.  The knee was irrigated with copious amounts of saline solution. The of femoral opening to the intramedullary canal was plugged with bone, local anesthetic was used to infiltrate the periarticular tissues ligaments as well as subcutaneous tissues and cement was prepared. The components were then cemented in place with excess cement removed. Once the cement had cured, the knee was inspected for any residual cement. Once there was no evidence of any residual cement, the final articular surface was impacted into position. The knee was placed through range of motion with good stability noted at full extension and mid range flexion. The knee could be fully extended and flexion beyond 130° with good patellar tracking was noted.  The tourniquet was deflated after total tourniquet time of 77 minutes.  The extensor mechanism was then repaired with 1. Vicryl suture. The knee was again irrigated and the knee range of motion checked. Once again, the knee could be placed from full extension to greater than 130° of flexion with good collateral stability at full extension and mid range flexion. The patella was noted to track well within the trochlear notch. The subcutaneous tissues were approximated with 2 O Vicryl. The skin was secured with glue. Dressings were then applied consisting of Mepilex and a 6 inch Ace bandage wrap from toes to groin. The patient was then transferred to the recovery room in stable and satisfactory postoperative condition.   I was present for the entire procedure., A qualified resident physician was not available., and A physician assistant was required during the procedure for retraction, tissue handling, dissection and suturing.    Patient Disposition:  PACU             SIGNATURE: Brandon Bunn DO  DATE: January 28, 2025  TIME: 9:53 AM

## 2025-01-28 NOTE — PROGRESS NOTES
PT Re-Evaluation     Today's date: 2025  Patient name: Toni Mahmood  : 1935  MRN: [id number]  Referring provider: Brandon Bunn DO  Dx:   Encounter Diagnosis     ICD-10-CM    1. Gait abnormality  R26.9       2. 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 Primary osteoarthritis of both knees  M17.0                      Assessment  Impairments: abnormal gait, abnormal or restricted ROM, activity intolerance, impaired balance, impaired physical strength, lacks appropriate home exercise program, pain with function, unable to perform ADL and activity limitations    Assessment details: PT notes the patient with decrease ROM and strength t/o the right knee and LE with demonstration of impaired gait pattern and balance s/p right TKA leading to increase pain level and functional limitations with need for a course of OPPT for 8 weeks with focus on gait/balance, manual therapy, strengthening, analgesic modalities, and update/review of HEP.    Understanding of Dx/Px/POC: good     Prognosis: good    Goals  ST.  Initiate HEP  2.  Decrease pain levels by 25-50%   3.  Increase ROM by 25-50%   4.  Increase strength by 1-2 mm grades  LT.  Improve gait pattern and balance to good/normal level with least restrictive AD  2.  Decrease limitations with standing, walking and stairs  3.  Decrease limitations with transfers and ADL  4.  Decrease limitations with squatting, lifting and carrying  5.  DC with HEP      Plan  Patient would benefit from: skilled physical therapy  Planned modality interventions: cryotherapy    Planned therapy interventions: manual therapy, neuromuscular re-education, patient/caregiver education, self care, strengthening, stretching, therapeutic exercise, balance, balance/weight bearing training, flexibility, gait training, graded exercise and home exercise program    Frequency: 2x week  Duration in weeks: 8  Treatment plan discussed with: patient  Plan details: PT notes review  of POC and findings with the patient who is in agreement with PT recommendations of course of skilled therapy.         Subjective Evaluation    History of Present Illness  Date of surgery: 2025  Mechanism of injury: surgery  Mechanism of injury: Patient is s/p right TKA with release to OPPT by MD.  Patient reports   Patient Goals  Patient goals for therapy: decreased pain, improved balance, increased motion, increased strength and independence with ADLs/IADLs    Pain  Current pain ratin  At best pain rating: 3  At worst pain ratin  Location: Right Knee  Quality: sharp, dull ache and discomfort  Relieving factors: rest  Aggravating factors: stair climbing, walking, standing and lifting    Treatments  Previous treatment: injection treatment  Current treatment: injection treatment and physical therapy        Objective     Observations     Right Knee   Positive for deformity.     Additional Observation Details  PT notes bilateral knee genu varum position     Palpation     Right   Tenderness of the vastus lateralis and vastus medialis.     Tenderness     Right Knee   Tenderness in the patellar tendon and quadriceps tendon. No tenderness in the lateral joint line, LCL (distal), LCL (proximal), MCL (distal), MCL (proximal) and medial joint line.     Neurological Testing     Reflexes   Left   Patellar (L4): trace (1+)  Achilles (S1): trace (1+)    Right   Patellar (L4): trace (1+)  Achilles (S1): trace (1+)    Active Range of Motion     Right Hip   Flexion: 51 degrees   Extension: 2 degrees   Abduction: WFL  External rotation (90/90): WFL  Internal rotation (90/90): WFL    Right Knee   Flexion: 101 degrees with pain  Extension: -7 degrees with pain    Additional Active Range of Motion Details  PT notes decrease ROM and strength t/o the right knee and LE     Passive Range of Motion     Right Hip   Flexion: 57 degrees   Extension: 3 degrees     Right Knee   Flexion: 104 degrees with pain  Extension: -3 degrees  with pain    Mobility   Patellar Mobility:     Right Knee   Hypomobile: medial, lateral, superior and inferior     Strength/Myotome Testing     Right Hip   Planes of Motion   Flexion: 4  Extension: 4+  Abduction: 4  Adduction: 4+  External rotation: 4-  Internal rotation: 4-    Right Knee   Flexion: 4  Extension: 4  Quadriceps contraction: fair    Tests     Right Hip   Negative NATHAN and DIMITRI.   Chester: Positive.   Modified Chester: Positive.   90/90 SLR: Positive.   SLR: Positive.     Right Knee   Positive lateral Reina and medial Reina.   Negative anterior Lachman, posterior Lachman, valgus stress test at 0 degrees and varus stress test at 0 degrees.     Swelling     Left Knee Girth Measurement (cm)   Joint line: 40 cm    Right Knee Girth Measurement (cm)   Joint line: 42 cm    Ambulation     Observational Gait   Gait: antalgic   Decreased walking speed, stride length and right stance time.              Precautions:  PMH HTN, T2DM, OA  POC 2/22/25      Manuals 1/22 1/29      Right knee flex and ext         Right HS, hip ABD, quad, piriformis, and gastroc with manual hip traction                         Neuro Re-Ed        Front and Lateral Lunge         STS- No UE        Bridge with ABD         Rocker board- Tandem F/B and S/S         BOSU March                         Ther Ex        Nu-step for ROM and strength         Heel slides with strap        Quad set         Supine SLR         LAQ         F/L step up         Stair HR/TR         HEP update/review  15 min        Ther Activity                        Gait Training                        Modalities        CP  PRN

## 2025-01-28 NOTE — PHYSICAL THERAPY NOTE
PHYSICAL THERAPY EVALUATION  NAME:  Toni Mahmood  DATE: 01/28/25    AGE:   89 y.o.  Mrn:   [id number]  ADMIT DX:  Primary osteoarthritis of both knees [M17.0]    Past Medical History:   Diagnosis Date    Arthritis     Diabetes mellitus (HCC)     Glaucoma     High cholesterol     Hypertension      Length Of Stay: 0  Performed at least 2 patient identifiers during session: Name and Birthday  PHYSICAL THERAPY EVALUATION :      01/28/25 1309   PT Last Visit   PT Visit Date 01/28/25   Note Type   Note type Evaluation   Pain Assessment   Pain Assessment Tool 0-10   Pain Score No Pain  (at rest)   Pain Location/Orientation Orientation: Right;Location: Knee   Restrictions/Precautions   Weight Bearing Precautions Per Order Yes   RLE Weight Bearing Per Order WBAT   Other Precautions Multiple lines;Telemetry;Fall Risk   Home Living   Type of Home House  (4-5 + 3 JUAN R HR and bar to hold onto on the L side)   Home Layout One level;Performs ADLs on one level;Able to live on main level with bedroom/bathroom   Bathroom Shower/Tub Tub/shower unit   Bathroom Toilet Standard   Bathroom Equipment Shower chair;Grab bars in shower;Commode  (2 BSCs)   Home Equipment Walker;Cane;Wheelchair-manual  (2 SWs, 2 SPC, 1 3-pronged cane)   Additional Comments not using an AD PTA   Prior Function   Level of [place] Independent with ADLs;Independent with functional mobility;Independent with IADLS   Lives With Spouse  (Son will be staying with them for as long as needed)   Receives Help From Family  (Son)   IADLs Independent with driving;Independent with meal prep;Independent with medication management   Falls in the last 6 months 0   Comments Reports being independent with mobility, ADLs and IADLs.   General   Additional Pertinent History Pt is s/p Right - ARTHROPLASTY KNEE TOTAL this date 1/28/25. Seen POD#0.   Cognition   Orientation Level Oriented X4   Following Commands Follows one step commands without difficulty   Subjective  "  Subjective \"I want to do it right.\"   RLE Assessment   RLE Assessment   (supine and seated ankle DF/pF WFL, supine knee flex ~ 90 degrees, seated just > 90 deg, knee ext supine ~-5 deg, seated EOB ~-10 deg AROM. , hip flexion, abd/add WFL. standing right knee ~-10 deg extension strength 3-/5 knee, 3+/5 hip and 4+/5 ankle)   LLE Assessment   LLE Assessment WFL  (4+/5)   Light Touch   RLE Light Touch Grossly intact   LLE Light Touch Grossly intact   Proprioception   RLE Proprioception Grossly intact   LLE Proprioception Grossly Intact   Bed Mobility   Supine to Sit 5  Supervision   Additional items Increased time required;Verbal cues   Additional Comments HOB flat without bedrails. inc time to complete. verbal cues for breathing throughout as pt was holding breath   Transfers   Sit to Stand 4  Minimal assistance   Additional items Assist x 1;Increased time required;Verbal cues   Stand to Sit 4  Minimal assistance   Additional items Assist x 1;Increased time required;Verbal cues   Stand pivot 4  Minimal assistance   Additional items Assist x 1;Increased time required;Verbal cues   Additional Comments use of SW. verbal and visual instruction for sequence and technique for safe mobility. pt verbalized understanding. sit<>stand with minAx1 with inc time and effort, cues for hand placement. spt with minAx1 with inc posterior lean turning with cues for technique, ant wt shifting and UE support on SW. inc right kene flexion in stance. cues for right LE placement prior to sitting.   Ambulation/Elevation   Gait pattern Wide RADHA;Antalgic;Decreased foot clearance;Short stride;Excessively slow;Knees flexed;Step to   Gait Assistance 4  Minimal assist   Additional items Assist x 1;Verbal cues   Assistive Device Standard walker   Distance ambulated 25'x1 with SW with miNAx1 with wide RADHA, antalgic apttern with decreased stance right LE, decreased foot clearance, cues to stay within RADHA of SW and cues for sequence and technique. " "min manual cues for balance and wt shifting, tactile cues right knee for extension in stance. pt with LOB turning and stepping backward with SW with decreased WB on SW and requiring minimal assistance to recover, cues for sequence and ant wt shifting.   Balance   Static Sitting Good   Dynamic Sitting Fair +   Static Standing Fair -   Dynamic Standing Poor +   Ambulatory Poor +   Activity Tolerance   Activity Tolerance Patient limited by fatigue   Medical Staff Made Aware Roseanna TAN   Nurse Made Aware Clarisa CORRIGAN   Assessment   Prognosis Good   Problem List Decreased strength;Decreased range of motion;Decreased endurance;Impaired balance;Decreased mobility;Decreased safety awareness;Obesity;Decreased skin integrity   Barriers to Discharge Comments increased fall risk, LOB turning and stepping backward with SW. need for assistance with mobility   Goals   Patient Goals \"Go home\"   STG Expiration Date 02/11/25   PT Treatment Day 1   Plan   Treatment/Interventions ADL retraining;Functional transfer training;LE strengthening/ROM;Elevations;Therapeutic exercise;Endurance training;Patient/family training;Equipment eval/education;Bed mobility;Gait training;Compensatory technique education;Spoke to nursing;Spoke to case management;OT   PT Frequency 5-7x/wk  (BID prn)   Discharge Recommendation   Rehab Resource Intensity Level, PT III (Minimum Resource Intensity)  (OPPT services)   Equipment Recommended Walker   Walker Package Recommended Wheeled walker   Change/add to Walker Package? No   AM-PAC Basic Mobility Inpatient   Turning in Flat Bed Without Bedrails 3   Lying on Back to Sitting on Edge of Flat Bed Without Bedrails 3   Moving Bed to Chair 3   Standing Up From Chair Using Arms 3   Walk in Room 3   Climb 3-5 Stairs With Railing 3   Basic Mobility Inpatient Raw Score 18   Basic Mobility Standardized Score 41.05   [facility] Level Of Mobility   -HLM Goal 6: Walk 10 steps or more   -HLM Achieved 7: Walk 25 feet " or more   Additional Treatment Session   Start Time 1335   End Time 1430   Treatment Assessment initiated RW. verbal and visual instruction provided for use of RW and difference between RW and SW with RW providing improved energy conservation, balance as it doesn't need to be lifted and promotion of imporved gait pattern with progression to reciprocal pattern. pt and spouse verbalized understanding. Sit to stand with RW with minAx1 from lower surface with cues for hand placement. Amb 25'x1 with RW with minAx1 with slow antalgic pattern with dec step length, foot clearance, cues for sequence to lead with right LE as pt often attempted to lead with L LE and cues for R knee ext in stance. Spt w RW w minAx1 due to slight posterior LOB requiring minAx1 to recover and cues for ant wt shifting. Stand to sit with minAx1 for controlled descent. Min cues for hand placement, sit<>stand with supervision with improved technique. Amb 70'x1 with RW with close supervision with dec step length, cues to keep RW in contact with ground and inc UE support on RW during R LE stance due to increasing knee flexion. Cues for R knee ext in stance. Completed simulated car transfer w supervision w inc time and min cues for right LE placement during stand to sit. Discussed moving seat all the way back and slightly reclined to facilitate transfer into and out of vehicle. Verbal and visual instruction provided for stair completion. Educated pt on sequence for ascending L LE and descending with R LE step to pattern. Trialed 4 steps with B Hrs with minAx1 up and down with manual cues R knee for extension with L LE advancement and progressed to close supervision with B hands on R HR, facing R HR leading up L LE and down R LE with pt's son next to pt providing CGA for safe completion up first 2 steps then close supervision last 2 steps and close supervision down. Reviewed sequence with pt, spouse and son who verbalized understanding. see below for  "remainder of session.   Equipment Use Amb 20'x1 w RW w close supervision to minAx1 w reciprocal pattern with decreased stance R LE, decreased step length with LOB posteriorly turning and stepping backward to sit in chair requiring minAx1 to recover. Then trialed amb to focus on technique turning after verbal and visual instruction 12'x2 with pt requiring close supervision to minAx1 for LOB again during 1st trial turning and stepping backward due to posterior lean despite cues for continues stepping and inc ant lean with inc UE support on RW. Last trial able to complete with improved technique with close supervision throughout without LOB. Reviewed AAROM with LAQ and knee flexion, pt demonstrated understanding. Reclined in recliner, reviewed pillow placement vertically and folded under ankle instead of horizontally to avoid risk of knee flexion contracture, reviewed quad set for knee extension/strengthening. Pt reports feeling \"fine\"reclined in recliner chair, no complaints of lightheadedness or dizziness. Discussed frequent ambulation at least 3x/day and ankle DF/PF for DVT prohylaxis. Pt and spouse appreciative of information. No further concerns or questions. Pt reclined, resting in recliner. Agreeable to RW for use at home. RN present to administer antibiotic. Retrieved RW for home and pt transferring to bed by nursing. Pt noted to be hypotensive with c/o lightheadedness as time increased reclined in chair. Completed sit to supine with minAx1 of right LE. BP improving once supine. RW adjusted to pt's height and provided to pt's spouse and posterior caps switched. Given BP, discussed plan to see pt in AM with pt and spouse. Emotional support and reassurance provided given spouses concern for pt. Pt progressed mobility to close supervision level after multiple trials for safety and technique with turning. He was able to complete stair trial with decreasing assistance. BP was stable after ambulation 25' and static " standing x2'.  He had no complaints prior to therapist getting RW for home. Then was pale in color and c/o lightheadedness. RN reports patient being medicated for elevated BP prior to session as SBP was noted to be > 200. He is limited by decreased strength and ROM, balance and endurance as well as poor BP control. He will continue to benefit from PT services to maximize LOF.    End of Consult   Patient Position at End of Consult Bedside chair;All needs within reach   End of Consult Comments then returned to supine due to being hypotensive        01/28/25 1320 01/28/25 1324 01/28/25 1337   Vitals   Pulse 74 82  --    Blood Pressure 150/61 142/66 141/63   MAP (mmHg) 97 95 90   BP Location Right arm Right arm Right arm   BP Method Automatic Automatic Automatic   Patient Position - Orthostatic VS Lying Sitting  (no complaints) Sitting  (after ambulation 25' twice, static standing 2'; no complaints)   Oxygen Therapy   SpO2 97 % 96 %  --    O2 Device None (Room air)  --   --      Pt requires PT/OT co-eval due to medical complexity, safety concerns, fall risk, significant assistance with mobility and/or cognitive-behavioral impairments.    (Please find full objective findings from PT assessment regarding body systems outlined above).     Assessment: Pt is a 89 y.o. male seen for PT evaluation s/p outpatient surgery at [facility] on 1/28/2025 with Primary osteoarthritis of right knee seen POD#0 s/p right TKA.  Order placed for PT services.  Upon evaluation: Pt is presenting with impaired functional mobility due to decreased strength, decreased ROM, decreased endurance, impaired balance, gait deviations, decreased safety awareness, fall risk, impaired skin integrity, and bilateral LE edema greatest at dorsum of feet  requiring  supervision assistance for bed mobility, minimal assistance for transfers, and minimal assistance for ambulation with SW . Pt's clinical presentation is currently unpredictable  given the functional mobility deficits above, especially weakness, edema of extremities, decreased endurance, impaired balance, gait deviations, decreased activity tolerance, decreased functional mobility tolerance, and decreased safety awareness, coupled with fall risks as indicated by -PAC 6-Clicks: 18/24 as well as impaired balance, polypharmacy, and obesity and combined with medical complications of hypertension , poor blood pressure control, abnormal blood sugars, and need for input for mobility technique/safety, OA right knee s/p right TKA.  Pt's PMHx and comorbidities that may affect physical performance and progress include: DM, HTN, and BPH, OA right knee, glaucoma . Personal factors affecting pt at time of IE include: step(s) to enter environment, advanced age, inability to perform IADLs, inability to perform ADLs, inability to navigate level surfaces without external assistance, and inability to navigate community distances. Pt will benefit from continued skilled PT services to address deficits as defined above and to maximize level of functional mobility to facilitate return toward PLOF and improved QOL. From PT/mobility standpoint, recommendation at time of d/c would be Level III (Minimum Resource Intensity), with family and/or caregiver support, and with walker in order to reduce fall risk and maximize pt's functional independence and consistency with mobility. Recommend trial with walker next 1-2 sessions and ther ex next 1-2 sessions.       The patient's -[facility] Basic Mobility Inpatient Short Form Raw Score is 18. A Raw score of greater than 16 suggests the patient may benefit from discharge to home. Please also refer to the recommendation of the Physical Therapist for safe discharge planning.       Goals: Pt will: Perform bed mobility tasks with independent to reposition in bed and prepare for transfers. Pt will perform transfers with modified Independent to decrease burden of care, decrease risk  for falls, and improve activity tolerance and prepare for ambulation. Pt will ambulate with RW or SW for >/= 250' with  modified Independent  to decrease burden of care, decrease risk for falls, improve activity tolerance, and improve gait quality and to access home environment. Pt will complete 1 step with RW or SW and >/= 12 steps with unilateral handrail with modified Independent to decrease burden of care, return to home with JUAN, decrease risk for falls, and improve activity tolerance. Pt will participate in objective balance assessment to determine baseline fall risk. Pt will increase B LE strength >/= 1/2 MMT grade to facilitate functional mobility.      Keyla Chauhan, PT,DPT

## 2025-01-28 NOTE — ASSESSMENT & PLAN NOTE
Lab Results   Component Value Date    HGBA1C 7.4 (H) 01/09/2025   Hold metformin, monitor correctional scale  Diabetic diet

## 2025-01-28 NOTE — ASSESSMENT & PLAN NOTE
PTA is on lisinopril, Lasix and amlodipine  Took amlodipine this morning, Lasix and lisinopril last taken yesterday    Postoperatively had hypertension and was administered IV hydralazine x 10 mg with subsequent significant hypotension  Administered IV fluid boluses with improvement    Monitor blood pressure overnight, resume medications tomorrow  As needed hydralazine 5 mg for elevated blood pressure ordered

## 2025-01-28 NOTE — CASE MANAGEMENT
Case Management Discharge Planning Note    Patient name Toni Mahmood  Location [facility] OR POOL/OR POOL MRN [id number]  : 1935 Date 2025       Current Admission Date: 2025  Current Admission Diagnosis:Primary osteoarthritis of right knee   Patient Active Problem List    Diagnosis Date Noted Date Diagnosed    Chronic pain of both knees 2023     Primary osteoarthritis of right knee 2023     Benign prostatic hyperplasia without urinary obstruction 2015     Essential hypertension 2015     Type 2 diabetes mellitus (HCC) 2015       LOS (days): 0  Geometric Mean LOS (GMLOS) (days):   Days to GMLOS:     OBJECTIVE:            Current admission status: Outpatient Surgery   Preferred Pharmacy:   [facility] [address] [place] [address] - [address] [facility]  [address] [facility] [address]  Phone: [phone] Fax: [phone]    Primary Care Provider: Nagi Allan DO    Primary Insurance: [facility] REP  Secondary Insurance:     DISCHARGE DETAILS:         DME Referral Provided  Referral made for DME?: Yes  DME referral completed for the following items:: Walker  DME Supplier Name:: [employer]    Other Referral/Resources/Interventions Provided:  Interventions: DME  Referral Comments: [employer]         Treatment Team Recommendation: Home  Discharge Destination Plan:: Home  Transport at Discharge : Family       CM informed by therapy of patient need for DME - walker. CM submitted referral via [employer] - provider signed off.   PT to provide walker to patient and have patient sign delivery ticket.  CM informed [employer] via [employer] of walker being delivered.     CM to follow patient's care and discharge needs.

## 2025-01-28 NOTE — NURSING NOTE
Patient finished with lunch. Denies any nausea. BP in 160's. Dr Bunn in at bedside to check on patient. PT notified that patient was ready for evaluation.

## 2025-01-28 NOTE — ANESTHESIA PROCEDURE NOTES
Peripheral Block    Patient location during procedure: holding area  Start time: 1/28/2025 7:15 AM  Reason for block: at surgeon's request and post-op pain management  Staffing  Performed by: Abran Madden MD  Authorized by: Abran Madden MD    Preanesthetic Checklist  Completed: patient identified, IV checked, site marked, risks and benefits discussed, surgical consent, monitors and equipment checked, pre-op evaluation and timeout performed  Peripheral Block  Patient position: supine  Prep: ChloraPrep  Patient monitoring: frequent blood pressure checks, continuous pulse oximetry and heart rate  Block type: IPACK  Laterality: right  Injection technique: single-shot  Procedures: ultrasound guided, Ultrasound guidance required for the procedure to increase accuracy and safety of medication placement and decrease risk of complications.  Ultrasound permanent image saved  bupivacaine (PF) (MARCAINE) 0.25 % injection 20 mL - Perineural   20 mL - 1/28/2025 7:20:00 AM  bupivacaine liposomal (EXPAREL) 1.3 % injection 20 mL - Perineural   10 mL - 1/28/2025 7:20:00 AM  Needle  Needle type: Stimuplex   Needle gauge: 20 G  Needle length: 4 in  Needle localization: anatomical landmarks and ultrasound guidance  Assessment  Injection assessment: incremental injection, frequent aspiration, injected with ease, negative aspiration, negative for heart rate change, no paresthesia on injection, no symptoms of intraneural/intravenous injection and needle tip visualized at all times  Paresthesia pain: none  Post-procedure:  site cleaned  patient tolerated the procedure well with no immediate complications

## 2025-01-28 NOTE — ANESTHESIA POSTPROCEDURE EVALUATION
Post-Op Assessment Note    CV Status:  Stable    Pain management: adequate    Multimodal analgesia used between 6 hours prior to anesthesia start to PACU discharge    Mental Status:  Sleepy   Hydration Status:  Euvolemic   PONV Controlled:  Controlled   Airway Patency:  Patent  Two or more mitigation strategies used for obstructive sleep apnea   Post Op Vitals Reviewed: Yes    No anethesia notable event occurred.    Staff: CRNA           Last Filed PACU Vitals:  Vitals Value Taken Time   Temp 98.8    Pulse 67 01/28/25 1005   /56    Resp 24 01/28/25 1005   SpO2 94 % 01/28/25 1005   Vitals shown include unfiled device data.

## 2025-01-28 NOTE — ANESTHESIA PROCEDURE NOTES
Peripheral Block    Patient location during procedure: holding area  Start time: 1/28/2025 7:10 AM  Reason for block: at surgeon's request and post-op pain management  Staffing  Performed by: Abran Madden MD  Authorized by: Abran Madden MD    Preanesthetic Checklist  Completed: patient identified, IV checked, site marked, risks and benefits discussed, surgical consent, monitors and equipment checked, pre-op evaluation and timeout performed  Peripheral Block  Patient position: supine  Prep: ChloraPrep  Patient monitoring: frequent blood pressure checks, continuous pulse oximetry and heart rate  Block type: Adductor Canal  Laterality: right  Injection technique: single-shot  Procedures: ultrasound guided, Ultrasound guidance required for the procedure to increase accuracy and safety of medication placement and decrease risk of complications.  Ultrasound permanent image saved  bupivacaine (PF) (MARCAINE) 0.25 % injection 20 mL - Perineural   10 mL - 1/28/2025 7:25:00 AM  bupivacaine liposomal (EXPAREL) 1.3 % injection 20 mL - Perineural   10 mL - 1/28/2025 7:25:00 AM  Needle  Needle type: Stimuplex   Needle gauge: 20 G  Needle length: 4 in  Needle localization: anatomical landmarks and ultrasound guidance  Assessment  Injection assessment: incremental injection, frequent aspiration, injected with ease, negative aspiration, negative for heart rate change, no paresthesia on injection, no symptoms of intraneural/intravenous injection and needle tip visualized at all times  Paresthesia pain: none  Post-procedure:  site cleaned  patient tolerated the procedure well with no immediate complications

## 2025-01-28 NOTE — CONSULTS
Consultation - Hospitalist   Name: Toni Mahmood 89 y.o. male I MRN: [id number]  Unit/Bed#: -01 I Date of Admission: 1/28/2025   Date of Service: 1/28/2025 I Hospital Day: 0   Inpatient consult to Internal Medicine  Consult performed by: Laura Ricketts PA-C  Consult ordered by: Jeevan Vivas PA-C        Physician Requesting Evaluation: Brandon Bunn DO   Reason for Evaluation / Principal Problem: Hypotension    Assessment & Plan  Primary osteoarthritis of right knee  S/p right knee total arthroplasty  PT/OT appreciated  Pain management per primary service/orthopedics  Briefly had perioperative respiratory insufficiency that has resolved with-patient is currently on room air  DVT prophylaxis  Benign prostatic hyperplasia without urinary obstruction  Monitor urinary retention protocol postoperatively  Essential hypertension  PTA is on lisinopril, Lasix and amlodipine  Took amlodipine this morning, Lasix and lisinopril last taken yesterday    Postoperatively had hypertension and was administered IV hydralazine x 10 mg with subsequent significant hypotension  Administered IV fluid boluses with improvement    Monitor blood pressure overnight, resume medications tomorrow  As needed hydralazine 5 mg for elevated blood pressure ordered  Type 2 diabetes mellitus (HCC)    Lab Results   Component Value Date    HGBA1C 7.4 (H) 01/09/2025   Hold metformin, monitor correctional scale  Diabetic diet  Hospitalist service will follow.      VTE Pharmacologic Prophylaxis:   Moderate Risk (Score 3-4) - Pharmacological DVT Prophylaxis Ordered: enoxaparin (Lovenox).  Code Status: Level 1 - Full Code   Discussion with family: Patient declined call to .     Anticipated Length of Stay: Patient will be admitted on an observation basis with an anticipated length of stay of less than 2 midnights secondary to hypotensive episode postoperatively.    History of Present Illness   Chief Complaint: Hypotension    Toni  Ela is a 89 y.o. male with a PMH of osteoarthritis, diabetes, hypertension, high cholesterol who presents with postoperative hypotension after a planned total knee arthroplasty.  Procedure was uncomplicated.  Patient was evaluated postoperatively and cleared to work with PT/OT.  Patient however then had hypertension and was administered IV hydralazine with subsequent hypotension.  This improved with IV fluids.  He is to be monitored overnight.  Patient endorses that during this event he was asymptomatic and did not have any dizziness, shortness of breath or chest pain.  Overall feeling quite well and denies any pain.     Review of Systems   Constitutional:  Negative for activity change, chills and fever.   HENT:  Negative for congestion, rhinorrhea and sore throat.    Eyes:  Negative for visual disturbance.   Respiratory:  Negative for cough, chest tightness and shortness of breath.    Cardiovascular:  Negative for chest pain and palpitations.   Gastrointestinal:  Negative for abdominal pain, constipation, diarrhea, nausea and vomiting.   Genitourinary:  Negative for difficulty urinating, dysuria, frequency and urgency.   Musculoskeletal:  Negative for arthralgias and myalgias.   Skin:  Negative for rash.   Neurological:  Negative for seizures, syncope, weakness and headaches.   All other systems reviewed and are negative.      Historical Information   Past Medical History:   Diagnosis Date    Arthritis     Diabetes mellitus (HCC)     Glaucoma     High cholesterol     Hypertension      Past Surgical History:   Procedure Laterality Date    CATARACT EXTRACTION      COLONOSCOPY       Social History     Tobacco Use    Smoking status: Never    Smokeless tobacco: Never    Tobacco comments:     Smoked at age 23 for about a year   Vaping Use    Vaping status: Never Used   Substance and Sexual Activity    Alcohol use: Not Currently     Comment: Occasional Beer    Drug use: Never    Sexual activity: Not on file      E-Cigarette/Vaping    E-Cigarette Use Never User      E-Cigarette/Vaping Substances    Nicotine No     THC No     CBD No     Flavoring No     Other No     Unknown No      History reviewed. No pertinent family history.  Social History:  Marital Status: /Civil Union   Occupation: Retired  Patient Pre-hospital Living Situation: Home  Patient Pre-hospital Level of Mobility: walks  Patient Pre-hospital Diet Restrictions: None    Meds/Allergies   I have reviewed home medications with patient personally.  Prior to Admission medications    Medication Sig Start Date End Date Taking? Authorizing Provider   acetaminophen (TYLENOL) 500 mg tablet Take 1 tablet (500 mg total) by mouth every 8 (eight) hours 1/28/25  Yes Jeevan Vivas PA-C   amLODIPine (NORVASC) 2.5 mg tablet Take 5 mg by mouth daily 1/2/25  Yes Historical Provider, MD   ascorbic acid (VITAMIN C) 500 MG tablet Take 1 tablet (500 mg total) by mouth 2 (two) times a day 1/8/25  Yes Brandon Bunn DO   aspirin 325 mg tablet Take 1 tablet (325 mg total) by mouth every 12 (twelve) hours 1/28/25  Yes Jeevan Vivas PA-C   cholecalciferol (VITAMIN D3) 25 mcg (1,000 units) tablet Take 1 tablet (1,000 Units total) by mouth daily 1/8/25  Yes Brandon Bunn DO   ferrous sulfate 325 (65 Fe) mg tablet Take 325 mg by mouth daily with breakfast   Yes Historical Provider, MD   folic acid (FOLVITE) 1 mg tablet Take 1 tablet (1 mg total) by mouth daily 1/8/25  Yes Brandon Bunn DO   furosemide (LASIX) 20 mg tablet Take 20 mg by mouth daily 8/2/21  Yes Historical Provider, MD   latanoprost (XALATAN) 0.005 % ophthalmic solution  8/10/21  Yes Historical Provider, MD   lisinopril (ZESTRIL) 20 mg tablet  9/6/21  Yes Historical Provider, MD   metFORMIN (GLUCOPHAGE) 500 mg tablet TAKE 1 TABLET BY MOUTH TWICE DAILY WITH MORNING MEAL AND WITH EVENING MEAL 6/22/21  Yes Historical Provider, MD   Multiple Vitamins-Minerals (multivitamin with minerals) tablet Take 1 tablet  by mouth daily 1/8/25  Yes Brandon Bunn DO   mupirocin (BACTROBAN) 2 % ointment Apply topically to the inside of the left and right nostrils twice daily for 5 days before surgery, including the morning of surgery. 1/8/25  Yes Brandon Bunn DO   oxyCODONE (Roxicodone) 5 immediate release tablet Take 1 tablet (5 mg total) by mouth every 4 (four) hours as needed for moderate pain for up to 3 days Max Daily Amount: 30 mg 1/28/25 1/31/25 Yes Jeevan Vivas PA-C   potassium chloride (MICRO-K) 10 MEQ CR capsule Take 10 mEq by mouth daily 8/2/21  Yes Historical Provider, MD   timolol (TIMOPTIC) 0.5 % ophthalmic solution  6/21/21  Yes Historical Provider, MD   zinc sulfate (ZINCATE) 220 mg capsule Take 1 capsule (220 mg total) by mouth daily 1/8/25  Yes Brandon Bunn DO   Aspirin Buf,CaCarb-MgCarb-MgO, 81 MG TABS Take by mouth  1/28/25 Yes Historical Provider, MD     No Known Allergies    Objective :  Temp:  [97 °F (36.1 °C)-98.8 °F (37.1 °C)] 97 °F (36.1 °C)  HR:  [54-84] 80  BP: ()/(32-86) 179/73  Resp:  [18-29] 18  SpO2:  [91 %-100 %] 91 %  O2 Device: None (Room air)    Physical Exam  Vitals and nursing note reviewed.   Constitutional:       General: He is not in acute distress.     Appearance: Normal appearance. He is not ill-appearing.   HENT:      Head: Normocephalic and atraumatic.      Nose: No congestion.      Mouth/Throat:      Mouth: Mucous membranes are moist.      Pharynx: Oropharynx is clear.   Eyes:      Conjunctiva/sclera: Conjunctivae normal.   Cardiovascular:      Rate and Rhythm: Normal rate and regular rhythm.      Pulses: Normal pulses.      Heart sounds: Normal heart sounds. No murmur heard.  Pulmonary:      Effort: Pulmonary effort is normal. No respiratory distress.      Breath sounds: Normal breath sounds.   Abdominal:      General: Bowel sounds are normal.      Palpations: Abdomen is soft.      Tenderness: There is no abdominal tenderness.   Musculoskeletal:         General: Normal  range of motion.      Cervical back: Normal range of motion.      Right lower leg: No edema.      Left lower leg: No edema.   Skin:     General: Skin is warm and dry.   Neurological:      Mental Status: He is alert and oriented to person, place, and time.   Psychiatric:         Mood and Affect: Mood normal.         Behavior: Behavior normal.          Lines/Drains:            Lab Results: I have reviewed the following results:              Results from last 7 days   Lab Units 01/28/25  1027 01/28/25  0644   POC GLUCOSE mg/dl 147* 77     Lab Results   Component Value Date    HGBA1C 7.4 (H) 01/09/2025                 Administrative Statements       ** Please Note: This note has been constructed using a voice recognition system. **

## 2025-01-28 NOTE — PLAN OF CARE
Problem: PHYSICAL THERAPY ADULT  Goal: Performs mobility at highest level of function for planned discharge setting.  See evaluation for individualized goals.  Description: Treatment/Interventions: ADL retraining, Functional transfer training, LE strengthening/ROM, Elevations, Therapeutic exercise, Endurance training, Patient/family training, Equipment eval/education, Bed mobility, Gait training, Compensatory technique education, Spoke to nursing, Spoke to case management, OT  Equipment Recommended: Walker       See flowsheet documentation for full assessment, interventions and recommendations.  Note: Prognosis: Good  Problem List: Decreased strength, Decreased range of motion, Decreased endurance, Impaired balance, Decreased mobility, Decreased safety awareness, Obesity, Decreased skin integrity  Assessment: Pt is a 89 y.o. male seen for PT evaluation s/p outpatient surgery at [facility] on 1/28/2025 with Primary osteoarthritis of right knee seen POD#0 s/p right TKA.  Order placed for PT services.  Upon evaluation: Pt is presenting with impaired functional mobility due to decreased strength, decreased ROM, decreased endurance, impaired balance, gait deviations, decreased safety awareness, fall risk, impaired skin integrity, and bilateral LE edema greatest at dorsum of feet  requiring  supervision assistance for bed mobility, minimal assistance for transfers, and minimal assistance for ambulation with SW . Pt's clinical presentation is currently unpredictable given the functional mobility deficits above, especially weakness, edema of extremities, decreased endurance, impaired balance, gait deviations, decreased activity tolerance, decreased functional mobility tolerance, and decreased safety awareness, coupled with fall risks as indicated by AM-PAC 6-Clicks: 18/24 as well as impaired balance, polypharmacy, and obesity and combined with medical complications of hypertension , poor blood pressure  control, abnormal blood sugars, and need for input for mobility technique/safety, OA right knee s/p right TKA.  Pt's PMHx and comorbidities that may affect physical performance and progress include: DM, HTN, and BPH, OA right knee, glaucoma . Personal factors affecting pt at time of IE include: step(s) to enter environment, advanced age, inability to perform IADLs, inability to perform ADLs, inability to navigate level surfaces without external assistance, and inability to navigate community distances. Pt will benefit from continued skilled PT services to address deficits as defined above and to maximize level of functional mobility to facilitate return toward PLOF and improved QOL. From PT/mobility standpoint, recommendation at time of d/c would be Level III (Minimum Resource Intensity), with family and/or caregiver support, and with walker in order to reduce fall risk and maximize pt's functional independence and consistency with mobility. Recommend trial with walker next 1-2 sessions and ther ex next 1-2 sessions.     Barriers to Discharge Comments: increased fall risk, LOB turning and stepping backward with SW. need for assistance with mobility  Rehab Resource Intensity Level, PT: III (Minimum Resource Intensity) (OPPT services)    See flowsheet documentation for full assessment.

## 2025-01-28 NOTE — ASSESSMENT & PLAN NOTE
S/p right knee total arthroplasty  PT/OT appreciated  Pain management per primary service/orthopedics  Briefly had perioperative respiratory insufficiency that has resolved with-patient is currently on room air  DVT prophylaxis

## 2025-01-28 NOTE — OCCUPATIONAL THERAPY NOTE
Occupational Therapy Evaluation     Patient Name: Toni Mahmood  Today's Date: 1/28/2025  Problem List  Principal Problem:    Primary osteoarthritis of right knee    Past Medical History  Past Medical History:   Diagnosis Date    Arthritis     Diabetes mellitus (HCC)     Glaucoma     High cholesterol     Hypertension      Past Surgical History  Past Surgical History:   Procedure Laterality Date    CATARACT EXTRACTION      COLONOSCOPY          01/28/25 1308   Note Type   Note type Evaluation   Pain Assessment   Pain Assessment Tool 0-10   Pain Score No Pain   Restrictions/Precautions   Weight Bearing Precautions Per Order Yes   RLE Weight Bearing Per Order WBAT   Other Precautions Fall Risk;Telemetry;Multiple lines   Home Living   Type of Home House  (4-5 + 3 JUAN R HR and bar to hold onto on the L side)   Home Layout One level;Performs ADLs on one level;Able to live on main level with bedroom/bathroom   Bathroom Shower/Tub Tub/shower unit   Bathroom Toilet Standard   Bathroom Equipment Shower chair;Grab bars in shower;Commode  (2 BSCs)   Home Equipment Walker;Cane;Wheelchair-manual  (2 SWs, 2 SPC, 1 3-pronged cane)   Additional Comments Wasn't using AD for functional mobility PTA.   Prior Function   Level of [place] Independent with ADLs;Independent with functional mobility;Independent with IADLS   Lives With Spouse  (Son will be staying with them for as long as needed)   Receives Help From Family  (Son)   IADLs Independent with driving;Independent with meal prep;Independent with medication management   Falls in the last 6 months 0   Comments PTA, pt reports independence with ADLs, IADLs, and functional mobility.   General   Family/Caregiver Present Yes  (Spouse and son)   ADL   UB Dressing Assistance 6  Modified independent   LB Dressing Assistance 4  Minimal Assistance   LB Dressing Deficit Don/doff R sock;Don/doff L sock   Additional Comments Pt initially unable to don B socks while seated at EOB.  Anticipate pt can complete LB ADLs as a whole with minimal assistance. UB ADLs at mod I.   Bed Mobility   Supine to Sit 5  Supervision  (HOB flat, no bedrails)   Additional items Increased time required;Verbal cues   Transfers   Sit to Stand 4  Minimal assistance   Additional items Assist x 1;Increased time required;Verbal cues   Stand to Sit 4  Minimal assistance   Additional items Assist x 1;Increased time required;Verbal cues   Stand pivot 4  Minimal assistance   Additional items Assist x 1;Increased time required;Verbal cues   Additional Comments Initially using pt's SW   Functional Mobility   Functional Mobility 4  Minimal assistance   Additional Comments Pt participated in short functional distance in room, walking to bathroom with min assist x 1 and SW.   Balance   Static Sitting Good   Dynamic Sitting Fair +   Static Standing Fair -   Dynamic Standing Poor +   Ambulatory Poor +   Activity Tolerance   Activity Tolerance Patient limited by fatigue   Medical Staff Made Aware PTKeyla   Nurse Made Aware Yes   RUE Assessment   RUE Assessment WFL   LUE Assessment   LUE Assessment WFL   Hand Function   Gross Motor Coordination Functional   Fine Motor Coordination Functional   Cognition   Overall Cognitive Status WFL   Arousal/Participation Alert;Cooperative   Attention Within functional limits   Orientation Level Oriented X4   Memory Within functional limits   Following Commands Follows one step commands without difficulty   Assessment   Limitation Decreased ADL status;Decreased endurance;Decreased self-care trans;Decreased high-level ADLs   Prognosis Good   Assessment Pt is a 89 y.o. male, admitted to [facility] 1/28/2025 d/t experiencing Primary osteoarthritis of R knee. Dx: Pt s/p R TKA POD #0, WBAT RLE. Pt with PMHx impacting their performance during ADL tasks, including: DM, glaucoma, high cholesterol, cataract extraction. Prior to admission to the hospital Pt was performing ADLs without physical assistance. IADLs  without physical assistance. Functional transfers/ambulation without physical assistance. Cognitive status PTA was WFL. OT order placed to assess Pt's ADLs, cognitive status, and performance during functional tasks in order to maximize safety and independence while making most appropriate d/c recommendations. PT/OT co-evaluation completed at this time d/t significant mobility deficits and safety concerns. Pt's clinical presentation is currently unstable/unpredictable given new onset deficits that affect Pt's occupational performance and ability to safely return to PLOF including decrease standing balance, decrease cognition, decrease performance during functional transfers, steps to enter home, and high fall risk combined with medical complications of hypertension , abnormal blood sugars, fear/retreat, and need for input for mobility technique/safety. Personal factors affecting Pt at time of initial evaluation include: step(s) to enter environment, advanced age, inability to perform IADLs, inability to perform ADLs, new need for AD, and inability to navigate community distances. Pt will benefit from continued skilled OT services to address deficits as defined above and to maximize level independence/participation during ADLs and functional tasks to facilitate return toward PLOF and improved quality of life. From an occupational therapy standpoint, Level III (Minimum Resource Intensity) is recommended upon d/c.   Plan   Treatment Interventions ADL retraining;Functional transfer training;Endurance training;UE strengthening/ROM;Patient/family training;Equipment evaluation/education;Compensatory technique education;Continued evaluation;Energy conservation;Activityengagement   Goal Expiration Date 02/11/25   OT Treatment Day 1   OT Frequency 3-5x/wk   Discharge Recommendation   Rehab Resource Intensity Level, OT III (Minimum Resource Intensity)   Additional Comments  Place BSC frame over top of standard toilet so he has  a raised toilet surface with grab bars   [facility] Daily Activity Inpatient   Lower Body Dressing 3   Bathing 3   Toileting 3   Upper Body Dressing 4   Grooming 3   Eating 4   Daily Activity Raw Score 20   Daily Activity Standardized Score (Calc for Raw Score >=11) 42.03   AM-PAC Applied Cognition Inpatient   Following a Speech/Presentation 3   Understanding Ordinary Conversation 4   Taking Medications 4   Remembering Where Things Are Placed or Put Away 4   Remembering List of 4-5 Errands 3   Taking Care of Complicated Tasks 3   Applied Cognition Raw Score 21   Applied Cognition Standardized Score 44.3   Additional Treatment Session   Start Time 1336   End Time 1346   Treatment Assessment Pt standing over toilet to urinate (pt's initial preference), requiring minimal assistance for safe static standing balance. Pt unable to urinate and requested to sit on toilet. Pt participated in functional transfer to standard toilet with minimal assistance and verbal cues for techniques. Pt urinated while seated on toilet. Pt sit > stand from standard toilet with grab bar, supervision and RW (RW initiated). With use of RW, pt participated in short functional distance, walking to recliner chair with minimal assistance. While seated in chair, pt able to doff/don his R sock with increased time and minimal assistance. Pt also donned his underwear (threading through BLEs in seated with verbal cues for technique), and pulling up over his hips in standing with minimal assistance for safe dynamic standing balance. BP was taken throughout and remained stable, primarily 140s systolic and 60s diastolic.   End of Consult   Patient Position at End of Consult Bedside chair;All needs within reach  (with PTKeyla)     The patient's raw score on the AM-PAC Daily Activity Inpatient Short Form is 20. A raw score of greater than or equal to 19 suggests the patient may benefit from discharge to home. Please refer to the recommendation of the  Occupational Therapist for safe discharge planning.    Pt goals to be met by 2/11/2025:    Pt will demonstrate ability to complete grooming/hygiene tasks @ mod I after set-up.  Pt will demonstrate ability to complete supine<>sit @ mod I in order to increase safety and independence during ADL tasks.  Pt will demonstrate ability to complete LB dressing @ mod I in order to increase safety and independence during meaningful tasks.   Pt will demonstrate ability to samantha/doff socks/shoes while sitting EOB/chair @ mod I in order to increase safety and independence during meaningful tasks.   Pt will demonstrate ability to complete toileting tasks including CM and pericare @ mod I in order to increase safety and independence during meaningful tasks.  Pt will demonstrate ability to complete EOB, chair, toilet/commode transfers @ mod I in order to increase performance and participation during functional tasks.  Pt will demonstrate ability to stand for 10 minutes while maintaining F+ balance with use of RW for UB support PRN.  Pt will demonstrate ability to tolerate 20 minute OT session with no vc'ing for deep breathing or use of energy conservation techniques in order to increase activity tolerance during functional tasks.   Pt will demonstrate Good carryover of use of energy conservation/compensatory strategies during ADLs and functional tasks in order to increase safety and reduce risk for falls.   Pt will demonstrate Good attention and participation in continued evaluation of functional ambulation house hold distances in order to assist with safe d/c planning.  Pt will attend to continued cognitive assessments 100% of the time in order to provide most appropriate d/c recommendations.   Pt will follow 100% simple 2-step commands and be A&O x4 consistently with environmental cues to increase participation in functional activities.   Pt will identify 3 areas of interest/hobbies and 1 intervention on how to incorporate into  daily life in order to increase interaction with environment and peers as well as increase participation in meaningful tasks.   Pt will demonstrate 100% carryover of BUE HEP in order to increase BUE MS and increase performance during functional tasks upon d/c home.    Red Iverson MS, OTR/L

## 2025-01-28 NOTE — NURSING NOTE
Pt being admitted for overnight observation s/p right knee SX. Gave report to nurse on unit Tatyana Stanley LPN.

## 2025-01-29 ENCOUNTER — APPOINTMENT (OUTPATIENT)
Dept: PHYSICAL THERAPY | Facility: CLINIC | Age: 89
End: 2025-01-29
Payer: COMMERCIAL

## 2025-01-29 VITALS
HEIGHT: 66 IN | HEART RATE: 91 BPM | WEIGHT: 199.7 LBS | OXYGEN SATURATION: 91 % | DIASTOLIC BLOOD PRESSURE: 70 MMHG | BODY MASS INDEX: 32.09 KG/M2 | TEMPERATURE: 97.7 F | SYSTOLIC BLOOD PRESSURE: 158 MMHG | RESPIRATION RATE: 18 BRPM

## 2025-01-29 DIAGNOSIS — R26.9 GAIT ABNORMALITY: Primary | ICD-10-CM

## 2025-01-29 DIAGNOSIS — M17.0 PRIMARY OSTEOARTHRITIS OF BOTH KNEES: ICD-10-CM

## 2025-01-29 DIAGNOSIS — Z96.651 H/O TOTAL KNEE REPLACEMENT, RIGHT: ICD-10-CM

## 2025-01-29 LAB
ANION GAP SERPL CALCULATED.3IONS-SCNC: 1 MMOL/L (ref 4–13)
BUN SERPL-MCNC: 22 MG/DL (ref 5–25)
CALCIUM SERPL-MCNC: 8.9 MG/DL (ref 8.4–10.2)
CHLORIDE SERPL-SCNC: 103 MMOL/L (ref 96–108)
CO2 SERPL-SCNC: 28 MMOL/L (ref 21–32)
CREAT SERPL-MCNC: 0.89 MG/DL (ref 0.6–1.3)
DME PARACHUTE DELIVERY DATE ACTUAL: NORMAL
DME PARACHUTE DELIVERY DATE REQUESTED: NORMAL
DME PARACHUTE ITEM DESCRIPTION: NORMAL
DME PARACHUTE ORDER STATUS: NORMAL
DME PARACHUTE SUPPLIER NAME: NORMAL
DME PARACHUTE SUPPLIER PHONE: NORMAL
ERYTHROCYTE [DISTWIDTH] IN BLOOD BY AUTOMATED COUNT: 13.6 % (ref 11.6–15.1)
GFR SERPL CREATININE-BSD FRML MDRD: 76 ML/MIN/1.73SQ M
GLUCOSE SERPL-MCNC: 142 MG/DL (ref 65–140)
GLUCOSE SERPL-MCNC: 168 MG/DL (ref 65–140)
GLUCOSE SERPL-MCNC: 204 MG/DL (ref 65–140)
GLUCOSE SERPL-MCNC: 215 MG/DL (ref 65–140)
HCT VFR BLD AUTO: 31.6 % (ref 36.5–49.3)
HGB BLD-MCNC: 10.5 G/DL (ref 12–17)
MCH RBC QN AUTO: 29.1 PG (ref 26.8–34.3)
MCHC RBC AUTO-ENTMCNC: 33.2 G/DL (ref 31.4–37.4)
MCV RBC AUTO: 88 FL (ref 82–98)
PLATELET # BLD AUTO: 159 THOUSANDS/UL (ref 149–390)
PMV BLD AUTO: 9.1 FL (ref 8.9–12.7)
POTASSIUM SERPL-SCNC: 4.7 MMOL/L (ref 3.5–5.3)
RBC # BLD AUTO: 3.61 MILLION/UL (ref 3.88–5.62)
SODIUM SERPL-SCNC: 132 MMOL/L (ref 135–147)
WBC # BLD AUTO: 8.35 THOUSAND/UL (ref 4.31–10.16)

## 2025-01-29 PROCEDURE — 80048 BASIC METABOLIC PNL TOTAL CA: CPT | Performed by: PHYSICIAN ASSISTANT

## 2025-01-29 PROCEDURE — 97110 THERAPEUTIC EXERCISES: CPT

## 2025-01-29 PROCEDURE — NC001 PR NO CHARGE: Performed by: PHYSICIAN ASSISTANT

## 2025-01-29 PROCEDURE — 99024 POSTOP FOLLOW-UP VISIT: CPT | Performed by: ORTHOPAEDIC SURGERY

## 2025-01-29 PROCEDURE — 99232 SBSQ HOSP IP/OBS MODERATE 35: CPT

## 2025-01-29 PROCEDURE — 97116 GAIT TRAINING THERAPY: CPT

## 2025-01-29 PROCEDURE — 97530 THERAPEUTIC ACTIVITIES: CPT

## 2025-01-29 PROCEDURE — 82948 REAGENT STRIP/BLOOD GLUCOSE: CPT

## 2025-01-29 PROCEDURE — 85027 COMPLETE CBC AUTOMATED: CPT | Performed by: PHYSICIAN ASSISTANT

## 2025-01-29 RX ADMIN — Medication 1 TABLET: at 08:49

## 2025-01-29 RX ADMIN — LISINOPRIL 15 MG: 10 TABLET ORAL at 15:15

## 2025-01-29 RX ADMIN — DOCUSATE SODIUM 100 MG: 100 CAPSULE, LIQUID FILLED ORAL at 08:48

## 2025-01-29 RX ADMIN — AMLODIPINE BESYLATE 5 MG: 5 TABLET ORAL at 08:49

## 2025-01-29 RX ADMIN — Medication 1000 UNITS: at 08:48

## 2025-01-29 RX ADMIN — INSULIN LISPRO 1 UNITS: 100 INJECTION, SOLUTION INTRAVENOUS; SUBCUTANEOUS at 16:47

## 2025-01-29 RX ADMIN — FOLIC ACID 1 MG: 1 TABLET ORAL at 08:48

## 2025-01-29 RX ADMIN — SODIUM CHLORIDE, SODIUM LACTATE, POTASSIUM CHLORIDE, AND CALCIUM CHLORIDE 125 ML/HR: .6; .31; .03; .02 INJECTION, SOLUTION INTRAVENOUS at 03:08

## 2025-01-29 RX ADMIN — ACETAMINOPHEN 325MG 975 MG: 325 TABLET ORAL at 05:12

## 2025-01-29 RX ADMIN — INSULIN LISPRO 2 UNITS: 100 INJECTION, SOLUTION INTRAVENOUS; SUBCUTANEOUS at 11:45

## 2025-01-29 RX ADMIN — LISINOPRIL 5 MG: 5 TABLET ORAL at 08:48

## 2025-01-29 RX ADMIN — ACETAMINOPHEN 325MG 975 MG: 325 TABLET ORAL at 14:27

## 2025-01-29 RX ADMIN — FUROSEMIDE 20 MG: 20 TABLET ORAL at 08:48

## 2025-01-29 RX ADMIN — OXYCODONE HYDROCHLORIDE AND ACETAMINOPHEN 500 MG: 500 TABLET ORAL at 08:49

## 2025-01-29 RX ADMIN — TIMOLOL MALEATE 1 DROP: 5 SOLUTION/ DROPS OPHTHALMIC at 08:53

## 2025-01-29 RX ADMIN — FERROUS SULFATE TAB 325 MG (65 MG ELEMENTAL FE) 325 MG: 325 (65 FE) TAB at 07:50

## 2025-01-29 RX ADMIN — ZINC SULFATE 220 MG (50 MG) CAPSULE 220 MG: CAPSULE at 08:49

## 2025-01-29 RX ADMIN — CEFAZOLIN SODIUM 2000 MG: 2 SOLUTION INTRAVENOUS at 05:11

## 2025-01-29 NOTE — ASSESSMENT & PLAN NOTE
POD #1 right TKA.  Ambulatory dysfunction.  PT/OT and advance with discharge planning.  Patient is orthopedically stable for discharge once medically cleared and passes physical therapy.

## 2025-01-29 NOTE — PROGRESS NOTES
Progress Note - Hospitalist   Name: Toni Mahmood 89 y.o. male I MRN: [id number]  Unit/Bed#: -01 I Date of Admission: 1/28/2025   Date of Service: 1/29/2025 I Hospital Day: 0    Assessment & Plan  Primary osteoarthritis of right knee  S/p right knee total arthroplasty  PT/OT appreciated  Pain management per primary service/orthopedics  Briefly had perioperative respiratory insufficiency that has resolved with-patient is currently on room air  DVT prophylaxis per orthopedics  Benign prostatic hyperplasia without urinary obstruction  Monitor urinary retention protocol postoperatively  Essential hypertension  PTA is on lisinopril, Lasix and amlodipine  Took amlodipine this morning, Lasix and lisinopril last taken yesterday    Postoperatively had hypertension and was administered IV hydralazine x 10 mg with subsequent significant hypotension  Administered IV fluid boluses with improvement    Mildly hypertensive this morning when working with PT, home medications have been resumed  Blood pressure improving  Pending reevaluation with PT later this afternoon, medically cleared for discharge as long as blood pressures remain stable  Type 2 diabetes mellitus (HCC)    Lab Results   Component Value Date    HGBA1C 7.4 (H) 01/09/2025   Hold metformin, monitor correctional scale  Resume on discharge  Diabetic diet    VTE Pharmacologic Prophylaxis:   High Risk (Score >/= 5) - Pharmacological DVT Prophylaxis Ordered: enoxaparin (Lovenox). Sequential Compression Devices Ordered.    Mobility:   Basic Mobility Inpatient Raw Score: 18  JH-HLM Goal: 6: Walk 10 steps or more  JH-HLM Achieved: 5: Stand (1 or more minutes)  JH-HLM Goal NOT achieved. Continue with multidisciplinary rounding and encourage appropriate mobility to improve upon JH-HLM goals.    Patient Centered Rounds: I performed bedside rounds with nursing staff today.   Discussions with Specialists or Other Care Team Provider: CM, orthopedics     Education and  Discussions with Family / Patient: Patient declined call to .     Current Length of Stay: 0 day(s)  Current Patient Status: Outpatient Surgery   Certification Statement: The patient will continue to require additional inpatient hospital stay due to pending reevaluation by PT  Discharge Plan: [facility] is following this patient on consult. They are medically stable for discharge when deemed appropriate by primary service.    Code Status: Level 1 - Full Code    Subjective   Seen and examined.  Patient endorses that he has had no dizziness, shortness of breath or chest pain.  No headaches.  Did not have any symptoms from elevated blood pressure this morning.  Felt well while working with PT.    Objective :  Temp:  [96.8 °F (36 °C)-98.2 °F (36.8 °C)] 96.8 °F (36 °C)  HR:  [54-89] 81  BP: ()/(32-89) 174/78  Resp:  [16-28] 18  SpO2:  [88 %-100 %] 96 %  O2 Device: None (Room air)    Body mass index is 32.23 kg/m².     Input and Output Summary (last 24 hours):     Intake/Output Summary (Last 24 hours) at 1/29/2025 1218  Last data filed at 1/29/2025 1201  Gross per 24 hour   Intake 810 ml   Output 425 ml   Net 385 ml       Physical Exam  Vitals and nursing note reviewed.   Constitutional:       General: He is not in acute distress.     Appearance: Normal appearance. He is not ill-appearing.   HENT:      Head: Normocephalic and atraumatic.      Nose: No congestion.      Mouth/Throat:      Mouth: Mucous membranes are moist.      Pharynx: Oropharynx is clear.   Eyes:      Conjunctiva/sclera: Conjunctivae normal.   Cardiovascular:      Rate and Rhythm: Normal rate and regular rhythm.      Pulses: Normal pulses.      Heart sounds: Normal heart sounds. No murmur heard.  Pulmonary:      Effort: Pulmonary effort is normal. No respiratory distress.      Breath sounds: Normal breath sounds.   Abdominal:      General: Bowel sounds are normal.      Palpations: Abdomen is soft.      Tenderness: There is no abdominal  tenderness.   Musculoskeletal:         General: Normal range of motion.      Cervical back: Normal range of motion.      Right lower leg: No edema.      Left lower leg: No edema.   Skin:     General: Skin is warm and dry.   Neurological:      Mental Status: He is alert and oriented to person, place, and time.   Psychiatric:         Mood and Affect: Mood normal.         Behavior: Behavior normal.           Lines/Drains:              Lab Results: I have reviewed the following results:   Results from last 7 days   Lab Units 01/29/25  0514   WBC Thousand/uL 8.35   HEMOGLOBIN g/dL 10.5*   HEMATOCRIT % 31.6*   PLATELETS Thousands/uL 159     Results from last 7 days   Lab Units 01/29/25  0514   SODIUM mmol/L 132*   POTASSIUM mmol/L 4.7   CHLORIDE mmol/L 103   CO2 mmol/L 28   BUN mg/dL 22   CREATININE mg/dL 0.89   ANION GAP mmol/L 1*   CALCIUM mg/dL 8.9   GLUCOSE RANDOM mg/dL 168*         Results from last 7 days   Lab Units 01/29/25  1058 01/29/25  0724 01/28/25  2054 01/28/25  1027 01/28/25  0644   POC GLUCOSE mg/dl 215* 142* 191* 147* 77               Recent Cultures (last 7 days):               Last 24 Hours Medication List:     Current Facility-Administered Medications:     acetaminophen (TYLENOL) tablet 975 mg, Q8H PARI    amLODIPine (NORVASC) tablet 5 mg, Daily    ascorbic acid (VITAMIN C) tablet 500 mg, BID    Cholecalciferol (VITAMIN D3) tablet 1,000 Units, Daily    docusate sodium (COLACE) capsule 100 mg, BID    ferrous sulfate tablet 325 mg, Daily With Breakfast    folic acid (FOLVITE) tablet 1 mg, Daily    furosemide (LASIX) tablet 20 mg, Daily    insulin lispro (HumALOG/ADMELOG) 100 units/mL subcutaneous injection 1-5 Units, TID AC **AND** Fingerstick Glucose (POCT), TID AC    lactated ringers bolus 1,000 mL, Once PRN **AND** lactated ringers bolus 1,000 mL, Once PRN    lactated ringers bolus 1,000 mL, Once PRN **AND** lactated ringers bolus 1,000 mL, Once PRN    lactated ringers infusion, Continuous, Last Rate:  125 mL/hr (01/29/25 0308)    latanoprost (XALATAN) 0.005 % ophthalmic solution 1 drop, HS    lisinopril (ZESTRIL) tablet 5 mg, Daily    multivitamin-minerals (CENTRUM) tablet 1 tablet, Daily    oxyCODONE (ROXICODONE) immediate release tablet 10 mg, Q6H PRN    oxyCODONE (ROXICODONE) IR tablet 5 mg, Q6H PRN    sodium chloride 0.9 % bolus 1,000 mL, Once PRN **AND** sodium chloride 0.9 % bolus 1,000 mL, Once PRN    sodium chloride 0.9 % bolus 1,000 mL, Once PRN **AND** sodium chloride 0.9 % bolus 1,000 mL, Once PRN    timolol (TIMOPTIC) 0.5 % ophthalmic solution 1 drop, Daily    zinc sulfate (ZINCATE) capsule 220 mg, Daily    Administrative Statements   Today, Patient Was Seen By: Laura Ricketts PA-C      **Please Note: This note may have been constructed using a voice recognition system.**

## 2025-01-29 NOTE — PLAN OF CARE
Problem: PAIN - ADULT  Goal: Verbalizes/displays adequate comfort level or baseline comfort level  Description: Interventions:  - Encourage patient to monitor pain and request assistance  - Assess pain using appropriate pain scale  - Administer analgesics based on type and severity of pain and evaluate response  - Implement non-pharmacological measures as appropriate and evaluate response  - Consider cultural and social influences on pain and pain management  - Notify physician/advanced practitioner if interventions unsuccessful or patient reports new pain  1/29/2025 1041 by Tatyana tSanley LPN  Outcome: Progressing  1/29/2025 1041 by Tatyana Stanley LPN  Outcome: Progressing     Problem: INFECTION - ADULT  Goal: Absence or prevention of progression during hospitalization  Description: INTERVENTIONS:  - Assess and monitor for signs and symptoms of infection  - Monitor lab/diagnostic results  - Monitor all insertion sites, i.e. indwelling lines, tubes, and drains  - Monitor endotracheal if appropriate and nasal secretions for changes in amount and color  - [place] appropriate cooling/warming therapies per order  - Administer medications as ordered  - Instruct and encourage patient and family to use good hand hygiene technique  - Identify and instruct in appropriate isolation precautions for identified infection/condition  Outcome: Progressing  Goal: Absence of fever/infection during neutropenic period  Description: INTERVENTIONS:  - Monitor WBC    Outcome: Progressing     Problem: SAFETY ADULT  Goal: Patient will remain free of falls  Description: INTERVENTIONS:  - Educate patient/family on patient safety including physical limitations  - Instruct patient to call for assistance with activity   - Consult OT/PT to assist with strengthening/mobility   - Keep Call bell within reach  - Keep bed low and locked with side rails adjusted as appropriate  - Keep care items and personal belongings within reach  - Initiate  and maintain comfort rounds  - Make Fall Risk Sign visible to staff  - Offer Toileting every    Hours, in advance of need  - Initiate/Maintain   alarm  - Obtain necessary fall risk management equipment:     - Apply yellow socks and bracelet for high fall risk patients  - Consider moving patient to room near nurses station  Outcome: Progressing  Goal: Maintain or return to baseline ADL function  Description: INTERVENTIONS:  -  Assess patient's ability to carry out ADLs; assess patient's baseline for ADL function and identify physical deficits which impact ability to perform ADLs (bathing, care of mouth/teeth, toileting, grooming, dressing, etc.)  - Assess/evaluate cause of self-care deficits   - Assess range of motion  - Assess patient's mobility; develop plan if impaired  - Assess patient's need for assistive devices and provide as appropriate  - Encourage maximum independence but intervene and supervise when necessary  - Involve family in performance of ADLs  - Assess for home care needs following discharge   - Consider OT consult to assist with ADL evaluation and planning for discharge  - Provide patient education as appropriate  Outcome: Progressing  Goal: Maintains/Returns to pre admission functional level  Description: INTERVENTIONS:  - Perform AM-PAC 6 Click Basic Mobility/ Daily Activity assessment daily.  - Set and communicate daily mobility goal to care team and patient/family/caregiver.   - Collaborate with rehabilitation services on mobility goals if consulted  - Perform Range of Motion    times a day.  - Reposition patient every    hours.  - Dangle patient      times a day  - Stand patient    times a day  - Ambulate patient    times a day  - Out of bed to chair    times a day   - Out of bed for meals    times a day  - Out of bed for toileting  - Record patient progress and toleration of activity level   Outcome: Progressing     Problem: DISCHARGE PLANNING  Goal: Discharge to home or other facility with  appropriate resources  Description: INTERVENTIONS:  - Identify barriers to discharge w/patient and caregiver  - Arrange for needed discharge resources and transportation as appropriate  - Identify discharge learning needs (meds, wound care, etc.)  - Arrange for interpretive services to assist at discharge as needed  - Refer to Case Management Department for coordinating discharge planning if the patient needs post-hospital services based on physician/advanced practitioner order or complex needs related to functional status, cognitive ability, or social support system  Outcome: Progressing     Problem: Knowledge Deficit  Goal: Patient/family/caregiver demonstrates understanding of disease process, treatment plan, medications, and discharge instructions  Description: Complete learning assessment and assess knowledge base.  Interventions:  - Provide teaching at level of understanding  - Provide teaching via preferred learning methods  Outcome: Progressing

## 2025-01-29 NOTE — PLAN OF CARE
Problem: PHYSICAL THERAPY ADULT  Goal: Performs mobility at highest level of function for planned discharge setting.  See evaluation for individualized goals.  Description: Treatment/Interventions: ADL retraining, Functional transfer training, LE strengthening/ROM, Elevations, Therapeutic exercise, Endurance training, Patient/family training, Equipment eval/education, Bed mobility, Gait training, Compensatory technique education, Spoke to nursing, Spoke to case management, OT  Equipment Recommended: Walker       See flowsheet documentation for full assessment, interventions and recommendations.  1/29/2025 1707 by Keyla Chauhan, PT  Note: Prognosis: Good  Problem List: Decreased strength, Decreased range of motion, Decreased endurance, Impaired balance, Decreased mobility, Decreased safety awareness, Obesity, Decreased skin integrity, Pain  Assessment: Pt tolerated session fairly well. He reports stiffness initially requiring increased assistance to complete transfers and ambulation however as mobility increased, patient able to complete with supervision. He demonstrated improved turning this AM and demonstrated improved gait pattern with use of RW compared to SW with cues for inc knee extension in stance. He fatigues quickly and requires increased sitting rest breaks and cues for breathing technique to recover DIAMOND. He continues to have elevated and fluctuating BP although asymptomatic. He requires cues for proper completion of LE TE. Right knee flexion ~ 60 deg supine, 80-90 deg max sitting EOB. Knee ext supine ~-5-10 deg. He is motivated to return to home with OPPT services but limited by decreased strength and ROM, balance and endurance and BP. He will continue to benefit from PT services to maximize LOF.     Barriers to Discharge Comments: fall risk, need for supervision with mobility, HTN  Rehab Resource Intensity Level, PT: III (Minimum Resource Intensity) (OPPT)    See flowsheet documentation for full  assessment.

## 2025-01-29 NOTE — PHYSICAL THERAPY NOTE
"   PHYSICAL THERAPY TREATMENT NOTE  NAME:  Toni Mahmood  DATE: 01/29/25    Length Of Stay: 0  Performed at least 2 patient identifiers during session: Name and Birthday    TREATMENT FLOW SHEET:    01/29/25 1337   PT Last Visit   PT Visit Date 01/29/25   Note Type   Note Type Treatment   Pain Assessment   Pain Assessment Tool 0-10   Pain Score 5   Pain Location/Orientation Orientation: Right;Location: Knee   Restrictions/Precautions   Weight Bearing Precautions Per Order Yes   RLE Weight Bearing Per Order WBAT   Other Precautions Chair Alarm;Bed Alarm;Fall Risk;Pain   General   Chart Reviewed Yes   Response to Previous Treatment Patient with no complaints from previous session.   Family/Caregiver Present Yes  (spouse and son)   Cognition   Following Commands Follows one step commands without difficulty   Subjective   Subjective \"I want to go home.\"   Bed Mobility   Supine to Sit 6  Modified independent   Additional items Increased time required   Sit to Supine 5  Supervision   Additional items Verbal cues   Additional Comments HOB ~ 15 degrees. inc time. sit ot supine with patient quick to complete with cues for safety. then completed supine to sit EOB modified independent.   Transfers   Sit to Stand 5  Supervision   Additional items Increased time required;Verbal cues   Stand to Sit 5  Supervision   Additional items Increased time required;Verbal cues   Stand pivot 5  Supervision   Additional items Increased time required;Verbal cues   Toilet transfer 5  Supervision   Additional items Increased time required;Verbal cues;Commode  (BSC over toilet)   Additional Comments RW. verbal cues for hand placement and tehcnique with ant wt shifting. cue sofr right LE placement. stand to sit with decreased eccentric control wiht cues for technique. repeated multiple times for safety with completion. spt wiht superviison with cues for turning completely. pt managed underwear down and up with supervision, elizabeth GARCIA. sit<>stand " with BSC with supervision with min cues for hand placement. spouse present throughout.   Ambulation/Elevation   Gait pattern Wide RADHA;Decreased foot clearance;Decreased R stance;Short stride;Excessively slow;Knees flexed   Gait Assistance   (supervision however 1x when turning to left to sit after ambulation, patient with inc posteiror lean with slight LOB posteriorly requiring minAx1 to recover)   Additional items Assist x 1;Verbal cues   Assistive Device Rolling walker   Distance ambulated 14'x1 and 15'x1 with RW with supervision with slow antalgic but reciprocal pattern. patient required miNAx1 to recover LOB 1x when turning to left with short shuffled steps and posteiror lean. pt then able to demonstrate turning to left with improved tehcnique and decreased posteiror lean with supervision. ambulated 90'x1 with RW with supervision with slow cadencee, reciprocal pattern with decreased step length. son eula patient for ambulation. discussed being present for ambulation and transfers, technique to assist patient. verbalized understanding.   Balance   Static Sitting Good   Dynamic Sitting Fair +   Static Standing Fair   Dynamic Standing Fair -   Ambulatory Fair -  (to poor +)   Endurance Deficit   Endurance Deficit Yes   Endurance Deficit Description fatigue   Activity Tolerance   Activity Tolerance Patient limited by fatigue;Patient limited by pain   Nurse Made Aware LPN, Hafsa   Exercises   Balance training  handout provided for LE TE supine and seated as reviewed earlier session. pt and spouse verbalize dunderstanding. discussed ambulatio at leats 3x/day and ankle DF/PF frequently. discussed pillow placement as there was a pillow folded under knee with knee in flexion upon arrival to room. educated patient, family and nursing for proper pillow placement. knee ext supine ~-10-15 degrees noted. PROM ~-5 degrees.   Assessment   Prognosis Good   Problem List Decreased strength;Decreased range of motion;Decreased  "endurance;Impaired balance;Decreased mobility;Decreased safety awareness;Obesity;Decreased skin integrity;Pain   Barriers to Discharge Comments fall risk, need for supervision with mobility, HTN   Goals   Patient Goals \"Go home\"   PT Treatment Day 3   Plan   Treatment/Interventions ADL retraining;Functional transfer training;LE strengthening/ROM;Elevations;Therapeutic exercise;Endurance training;Patient/family training;Equipment eval/education;Bed mobility;Gait training;Compensatory technique education;Spoke to nursing;Spoke to case management;OT   Progress Progressing toward goals   PT Frequency 5-7x/wk  (BID prn)   Discharge Recommendation   Rehab Resource Intensity Level, PT III (Minimum Resource Intensity)  (OPPT)   Additional Comments discussed spouse or son to be present with patient with mobility. spouse and son verbalized understanidng of providing assistance for patient prn especially when turning to sit after ambulation. son providing supervision for transfers and ambulaiton and verbalized no concerns assisting patient if needed.   AM-PAC Basic Mobility Inpatient   Turning in Flat Bed Without Bedrails 4   Lying on Back to Sitting on Edge of Flat Bed Without Bedrails 4   Moving Bed to Chair 3   Standing Up From Chair Using Arms 3   Walk in Room 3   Climb 3-5 Stairs With Railing 3   Basic Mobility Inpatient Raw Score 20   Basic Mobility Standardized Score 43.99   [facility] Highest Level Of Mobility   -HLM Goal 6: Walk 10 steps or more   -HLM Achieved 7: Walk 25 feet or more   Education   Education Provided Mobility training;Home exercise program;Assistive device   Patient Demonstrates acceptance/verbal understanding   End of Consult   Patient Position at End of Consult Bedside chair;Bed/Chair alarm activated;All needs within reach   End of Consult Comments left leg elevated with pillow folded under heel to facilitate knee extension        01/29/25 1344 01/29/25 1347 01/29/25 1358   Vitals   Pulse 89 " 96 93   Blood Pressure 162/80 (!) 183/73 152/70   MAP (mmHg) 107 110 97   BP Location Left arm Left arm Left arm   BP Method Automatic Automatic Automatic   Patient Position - Orthostatic VS Sitting  (EOB)   (standing after ambulation 12') Sitting  (after standing at sink 2' and ambulation 12', inc pain; no complaints)   Oxygen Therapy   SpO2 95 % 94 % 90 %   O2 Device None (Room air) None (Room air) None (Room air)      01/29/25 1405   Vitals   Pulse 91   Blood Pressure (!) 176/72   MAP (mmHg) 107   BP Location Left arm   BP Method Automatic   Patient Position - Orthostatic VS Sitting  (end of session after ambulation in hallway)   Oxygen Therapy   SpO2 92 %   O2 Device None (Room air)       The patient's AM-PAC Basic Mobility Inpatient Short Form Raw Score is 20. A Raw score of greater than 16 suggests the patient may benefit from discharge to home. Please also refer to the recommendation of the Physical Therapist for safe discharge planning.     Pt tolerated session well. He was able to complete toilet transfer with decreased assistance with use of BSC which family is putting over toilet at home. He requires cues for safety with transfers especially eccentric control to sit and was able to demonstrate improved stand to sit transfer after cues. He demonstrates reciprocal pattern with ambulation with RW but requires cues for inc step length and foot clearance as well as knee flexion in stance. He was able to demonstrate supervision with turning to sit after ambulation after verbal and visual cues for technique and safe completion. He continues to demonstrate hypertension with BP increased with mobility and in standing, but decreased to 152/70 upon sitting after short distance ambulation and standing dynamic balance, 10 mmHG from baseline sitting BP. Dr. Bunn aware of Bps. He is limited by decreased strength and ROM, balance, endurance and BP. He will continue to benefit from PT services to maximize LOF.      Keyla Chauhan, PT,DPT

## 2025-01-29 NOTE — DISCHARGE SUMMARY
Discharge Summary - Orthopedics   Name: Toni [age] y.o. male I MRN: [id number]  Unit/Bed#: -01 I Date of Admission: 1/28/2025   Date of Service: 1/29/2025 I Hospital Day: 0    ORTHOPEDICS DISCHARGE SUMMARY  Toni Bob y.o. male MRN: [id number]  Unit/Bed#: -01    Attending Physician: Brandon Bunn DO    Admitting diagnosis: Primary osteoarthritis of both knees [M17.0]    Discharge diagnosis: Primary osteoarthritis of both knees [M17.0]    Date of admission: 1/28/2025    Date of discharge: 01/29/25         Procedure: Right total knee arthroplasty    HPI:  This is a 89 y.o. year old male that presented to the office with signs and symptoms of right knee osteoarthritis. They tried and failed conservative treatment measures and wished to proceed with surgical intervention. The risks, benefits, and complications of the procedure were discussed with the patient and informed consent was obtained.    Hospital Course:  The patient was admitted to the hospital on 1/28/2025 and underwent an uncomplicated right total knee arthroplasty.  He was cleared for discharge as a same-day surgery [place] just prior to discharge developed hypoxia and hypotension thus was maintained overnight for observation.  He received postop antibiotics and Lovenox injection for DVT prophylaxis along with SCDs.  The following day he was noted to be doing satisfactorily from an orthopedic standpoint.  He was eventually cleared by the internal medicine staff and physical therapy for discharge home.  He is to have a follow-up appointment with his PCP in 1 week for blood pressure check.  He will start aspirin 325 mg 1 tablet twice daily tomorrow for DVT prophylaxis.  Continue physical therapy with total knee replacement protocol and follow-up with Dr. Bunn in 2 weeks in the orthopedic office.  Daily discussion was had with the patient, nursing staff, orthopaedic team, and family members if present.  All questions were  answered to the patients satisifaction.      0   Lab Value Date/Time    HGB 10.5 (L) 01/29/2025 0514    HGB 12.5 01/09/2025 0845       There was not a greater than 2 gram drop in H&H.     Body mass index is 32.23 kg/m². mildly obese. Recommend behavior modifications.    Discharge Instructions:  The patient was discharged weight bearing as tolerated to the right lower extremity.  Aspirin 325 mg twice daily will be continued for 28 days. Continue PT/OT. Take pain medications as instructed.    Discharge Medications:  For the complete list of discharge medications, please refer to the patient's medication reconciliation.

## 2025-01-29 NOTE — PLAN OF CARE
Problem: PAIN - ADULT  Goal: Verbalizes/displays adequate comfort level or baseline comfort level  Description: Interventions:  - Encourage patient to monitor pain and request assistance  - Assess pain using appropriate pain scale  - Administer analgesics based on type and severity of pain and evaluate response  - Implement non-pharmacological measures as appropriate and evaluate response  - Consider cultural and social influences on pain and pain management  - Notify physician/advanced practitioner if interventions unsuccessful or patient reports new pain  1/29/2025 1805 by Tatyana Stanley LPN  Outcome: Adequate for Discharge  1/29/2025 1041 by Tatyana Stanley LPN  Outcome: Progressing     Problem: INFECTION - ADULT  Goal: Absence or prevention of progression during hospitalization  Description: INTERVENTIONS:  - Assess and monitor for signs and symptoms of infection  - Monitor lab/diagnostic results  - Monitor all insertion sites, i.e. indwelling lines, tubes, and drains  - Monitor endotracheal if appropriate and nasal secretions for changes in amount and color  - [place] appropriate cooling/warming therapies per order  - Administer medications as ordered  - Instruct and encourage patient and family to use good hand hygiene technique  - Identify and instruct in appropriate isolation precautions for identified infection/condition  1/29/2025 1805 by Tatyana Stanley LPN  Outcome: Adequate for Discharge  1/29/2025 1041 by Tatyana Stanley LPN  Outcome: Progressing  Goal: Absence of fever/infection during neutropenic period  Description: INTERVENTIONS:  - Monitor WBC    1/29/2025 1805 by Tatyana Stanley LPN  Outcome: Adequate for Discharge  1/29/2025 1041 by Tatyana Stanley LPN  Outcome: Progressing     Problem: SAFETY ADULT  Goal: Patient will remain free of falls  Description: INTERVENTIONS:  - Educate patient/family on patient safety including physical limitations  - Instruct patient to call for assistance  with activity   - Consult OT/PT to assist with strengthening/mobility   - Keep Call bell within reach  - Keep bed low and locked with side rails adjusted as appropriate  - Keep care items and personal belongings within reach  - Initiate and maintain comfort rounds  - Make Fall Risk Sign visible to staff  - Offer Toileting every    Hours, in advance of need  - Initiate/Maintain   alarm  - Obtain necessary fall risk management equipment:     - Apply yellow socks and bracelet for high fall risk patients  - Consider moving patient to room near nurses station  1/29/2025 1805 by Tatayna Stanley LPN  Outcome: Adequate for Discharge  1/29/2025 1041 by Tatyana Stanley LPN  Outcome: Progressing  Goal: Maintain or return to baseline ADL function  Description: INTERVENTIONS:  -  Assess patient's ability to carry out ADLs; assess patient's baseline for ADL function and identify physical deficits which impact ability to perform ADLs (bathing, care of mouth/teeth, toileting, grooming, dressing, etc.)  - Assess/evaluate cause of self-care deficits   - Assess range of motion  - Assess patient's mobility; develop plan if impaired  - Assess patient's need for assistive devices and provide as appropriate  - Encourage maximum independence but intervene and supervise when necessary  - Involve family in performance of ADLs  - Assess for home care needs following discharge   - Consider OT consult to assist with ADL evaluation and planning for discharge  - Provide patient education as appropriate  1/29/2025 1805 by Tatyana Stanley LPN  Outcome: Adequate for Discharge  1/29/2025 1041 by Tatyana Stanley LPN  Outcome: Progressing  Goal: Maintains/Returns to pre admission functional level  Description: INTERVENTIONS:  - Perform AM-PAC 6 Click Basic Mobility/ Daily Activity assessment daily.  - Set and communicate daily mobility goal to care team and patient/family/caregiver.   - Collaborate with rehabilitation services on mobility goals if  consulted  - Perform Range of Motion    times a day.  - Reposition patient every    hours.  - Dangle patient    times a day  - Stand patient    times a day  - Ambulate patient    times a day  - Out of bed to chair    times a day   - Out of bed for meals      times a day  - Out of bed for toileting  - Record patient progress and toleration of activity level   1/29/2025 1805 by Tatyana Stanley LPN  Outcome: Adequate for Discharge  1/29/2025 1041 by Tatyana Stanley LPN  Outcome: Progressing     Problem: DISCHARGE PLANNING  Goal: Discharge to home or other facility with appropriate resources  Description: INTERVENTIONS:  - Identify barriers to discharge w/patient and caregiver  - Arrange for needed discharge resources and transportation as appropriate  - Identify discharge learning needs (meds, wound care, etc.)  - Arrange for interpretive services to assist at discharge as needed  - Refer to Case Management Department for coordinating discharge planning if the patient needs post-hospital services based on physician/advanced practitioner order or complex needs related to functional status, cognitive ability, or social support system  1/29/2025 1805 by Tatyana Stanley LPN  Outcome: Adequate for Discharge  1/29/2025 1041 by Tatyana Stanley LPN  Outcome: Progressing     Problem: Knowledge Deficit  Goal: Patient/family/caregiver demonstrates understanding of disease process, treatment plan, medications, and discharge instructions  Description: Complete learning assessment and assess knowledge base.  Interventions:  - Provide teaching at level of understanding  - Provide teaching via preferred learning methods  1/29/2025 1805 by Tatyana Stanley LPN  Outcome: Adequate for Discharge  1/29/2025 1041 by Tatyana Stanley LPN  Outcome: Progressing

## 2025-01-29 NOTE — ASSESSMENT & PLAN NOTE
Lab Results   Component Value Date    HGBA1C 7.4 (H) 01/09/2025   Hold metformin, monitor correctional scale  Resume on discharge  Diabetic diet

## 2025-01-29 NOTE — ASSESSMENT & PLAN NOTE
S/p right knee total arthroplasty  PT/OT appreciated  Pain management per primary service/orthopedics  Briefly had perioperative respiratory insufficiency that has resolved with-patient is currently on room air  DVT prophylaxis per orthopedics

## 2025-01-29 NOTE — PHYSICAL THERAPY NOTE
"   PHYSICAL THERAPY TREATMENT NOTE  NAME:  Toni Mahmood  DATE: 01/29/25    Length Of Stay: 0  Performed at least 2 patient identifiers during session: Name and Birthday    TREATMENT FLOW SHEET:    01/29/25 0905   PT Last Visit   PT Visit Date 01/29/25   Note Type   Note Type Treatment   Pain Assessment   Pain Assessment Tool 0-10   Pain Score 5   Pain Location/Orientation Orientation: Right;Location: Knee   Restrictions/Precautions   Weight Bearing Precautions Per Order Yes   RLE Weight Bearing Per Order WBAT   Other Precautions Chair Alarm;Bed Alarm;Fall Risk;Pain;Multiple lines   General   Chart Reviewed Yes   Response to Previous Treatment Patient with no complaints from previous session.   Family/Caregiver Present Yes  (spouse and son)   Cognition   Orientation Level Oriented X4   Following Commands Follows one step commands without difficulty   Subjective   Subjective \"It feels good to sit up.\"   Bed Mobility   Supine to Sit 5  Supervision   Additional items Increased time required;Verbal cues;HOB elevated  (HOB elevated 20 degrees. inc time and effort, min cues for)   Additional Comments HOB elevated ~ 30 degrees. inc time to complete.   Transfers   Sit to Stand 4  Minimal assistance  (progressed to supervision)   Additional items Assist x 1;Increased time required;Verbal cues   Stand to Sit 4  Minimal assistance  (progressed to supervision)   Additional items Assist x 1;Increased time required;Verbal cues   Stand pivot 4  Minimal assistance  (progressed to supervision)   Additional items Assist x 1;Increased time required;Verbal cues   Toilet transfer 4  Minimal assistance   Additional items Assist x 1;Increased time required;Verbal cues;Standard toilet   Additional Comments RW. verbal cues for hand placement for safety with RW and improved tehcnique. manual cues to ahcieve standing from EOB with inc time and effort. spt with RW with minAx1 wiht manual cues for balance and wt shifting and verbal cues for " technique and sequence. stand to sit wiht minAx1 for controlled descent and verbal cues for right LE placement. sit<>stand from toilet with miNAx1. static standing with slight posteiror lean managing underwear down and up with close supervision. wide RADHA. knee flexion bilaterally. progressed sit<>stand transfers to supervision as session progressed with improved tehcnique and sequence.   Ambulation/Elevation   Gait pattern Wide RADHA;Improper Weight shift   Gait Assistance   (minAx1-->supervision)   Additional items Assist x 1;Verbal cues   Assistive Device Standard walker;Rolling walker   Distance ambulated 25'x1 and 14'x1 with minAx1 intiailly progresssing to supervision. 2nd trial. use of SW for each. cues for sequence, technique. then use of RW. ambulated 115'x1, 20'x1 with supervision with slow antalgic pattern with decreased sstance right LE progressing to reciprocal pattern. vebral cues for inc UE support on RW when turing and keeping RW in contact with ground initially when turning.   Stair Management Assistance 5  Supervision   Additional items Increased time required;Verbal cues   Stair Management Technique One rail R;Step to pattern;Foreward   Number of Stairs 5   Ambulation/Elevation Additional Comments B hands on right HR ascend and left HR to descend. cues for technique, sequence. tactile cues at knee initially. son present for stair training. son and spouse verbalize understanding for stair management. no concerns or questions.   Balance   Static Sitting Good   Dynamic Sitting Fair +   Static Standing Fair -   Dynamic Standing Fair -   Ambulatory   (poor + to fair -)   Endurance Deficit   Endurance Deficit Yes   Endurance Deficit Description fatigue. min DIAMOND noted. SpO2 88-91%. pt on room air throughout.   Activity Tolerance   Activity Tolerance Patient limited by fatigue;Patient limited by pain   Nurse Made Aware LPN, Haf[place]   Exercises   Quad Sets Supine;10 reps;Bilateral   Heelslides Supine;10  "reps;AAROM;Right   Glute Sets Supine;10 reps;Bilateral   Hip Abduction Supine;10 reps;AROM;Right   Knee AROM Short Arc Quad Supine;10 reps;AAROM;Right   Knee Flexion Stretch Sitting;10 reps;AROM;Right   Assessment   Prognosis Good   Problem List Decreased strength;Decreased range of motion;Decreased endurance;Impaired balance;Decreased mobility;Decreased safety awareness;Obesity;Decreased skin integrity;Pain   Barriers to Discharge Comments fall risk, need for assistance with mobility initially reporting stiffness. HTN   Goals   Patient Goals \"Go home\"   PT Treatment Day 2   Plan   Treatment/Interventions ADL retraining;Functional transfer training;LE strengthening/ROM;Elevations;Therapeutic exercise;Endurance training;Patient/family training;Equipment eval/education;Bed mobility;Gait training;Compensatory technique education;Spoke to nursing;Spoke to case management;OT   Progress Progressing toward goals   PT Frequency 5-7x/wk  (BID prn)   Discharge Recommendation   Rehab Resource Intensity Level, PT III (Minimum Resource Intensity)  (OPPT)   Additional Comments discussed 1 more session this PM to further monitor vitals. pt verbalize dunderstanding.   Additional Comments 2 discussed placement of pillow for promotion of knee extension, not placing pillow under knee to allow for flexion. pt and family verbalize dunderstanding.   AM-PAC Basic Mobility Inpatient   Turning in Flat Bed Without Bedrails 3   Lying on Back to Sitting on Edge of Flat Bed Without Bedrails 3   Moving Bed to Chair 3   Standing Up From Chair Using Arms 3   Walk in Room 3   Climb 3-5 Stairs With Railing 3   Basic Mobility Inpatient Raw Score 18   Basic Mobility Standardized Score 41.05   [facility] Highest Level Of Mobility   -HLM Goal 6: Walk 10 steps or more   -HLM Achieved 7: Walk 25 feet or more   Education   Education Provided Home exercise program;Mobility training;Assistive device   Patient Demonstrates acceptance/verbal " understanding   End of Consult   Patient Position at End of Consult Bedside chair;Bed/Chair alarm activated;All needs within reach        01/29/25 0922 01/29/25 0924 01/29/25 0928   Vitals   Pulse 77 75 79   Blood Pressure 161/76 (!) 171/78 155/69   MAP (mmHg) 104 109 98   Patient Position - Orthostatic VS Sitting  (EOB, no complaints) Sitting  (EOB, after 2') Sitting  (after ambulation 25', no complaints)   Oxygen Therapy   SpO2 91 % (!) 89 % (!) 89 %   O2 Device None (Room air) None (Room air)  (pulse ox on side of BP. pt with min DIAMOND during mobility.) None (Room air)      01/29/25 0932 01/29/25 0940 01/29/25 0954   Vitals   Pulse 70 67 78   Blood Pressure 154/60 151/61 142/64   MAP (mmHg) 91 91 90   Patient Position - Orthostatic VS   (after sitting rest and ambulation 14', no complaints) Sitting Sitting  (after ambulation 115', sitting rest 2', stair completion, sitting rest 2' and ambulation 20'; no complaints)   Oxygen Therapy   SpO2 (!) 88 % 90 % (!) 89 %   O2 Device None (Room air) None (Room air) None (Room air)       The patient's AM-PAC Basic Mobility Inpatient Short Form Raw Score is 18. A Raw score of greater than 16 suggests the patient may benefit from discharge to home. Please also refer to the recommendation of the Physical Therapist for safe discharge planning.     Pt tolerated session fairly well. He reports stiffness initially requiring increased assistance to complete transfers and ambulation however as mobility increased, patient able to complete with supervision. He demonstrated improved turning this AM and demonstrated improved gait pattern with use of RW compared to SW with cues for inc knee extension in stance. He fatigues quickly and requires increased sitting rest breaks and cues for breathing technique to recover DIAMOND. He continues to have elevated and fluctuating BP although asymptomatic. He requires cues for proper completion of LE TE. Right knee flexion ~ 60 deg supine, 80-90 deg max  sitting EOB. Knee ext supine ~-5-10 deg. He is motivated to return to home with OPPT services but limited by decreased strength and ROM, balance and endurance and BP. He will continue to benefit from PT services to maximize LOF.     Keyla Chauhan, PT,DPT

## 2025-01-29 NOTE — PHYSICAL THERAPY NOTE
PHYSICAL THERAPY NOTE          Patient Name: Toni Mahmood  Today's Date: 1/29/2025    Received new order for PT services as patient was admitted post op. Will continue POC established 1/28/25.    Keyla Chauhan, PT,DPT

## 2025-01-29 NOTE — PLAN OF CARE
Problem: PAIN - ADULT  Goal: Verbalizes/displays adequate comfort level or baseline comfort level  Description: Interventions:  - Encourage patient to monitor pain and request assistance  - Assess pain using appropriate pain scale  - Administer analgesics based on type and severity of pain and evaluate response  - Implement non-pharmacological measures as appropriate and evaluate response  - Consider cultural and social influences on pain and pain management  - Notify physician/advanced practitioner if interventions unsuccessful or patient reports new pain  Outcome: Progressing     Problem: INFECTION - ADULT  Goal: Absence or prevention of progression during hospitalization  Description: INTERVENTIONS:  - Assess and monitor for signs and symptoms of infection  - Monitor lab/diagnostic results  - Monitor all insertion sites, i.e. indwelling lines, tubes, and drains  - Monitor endotracheal if appropriate and nasal secretions for changes in amount and color  - [place] appropriate cooling/warming therapies per order  - Administer medications as ordered  - Instruct and encourage patient and family to use good hand hygiene technique  - Identify and instruct in appropriate isolation precautions for identified infection/condition  Outcome: Progressing  Goal: Absence of fever/infection during neutropenic period  Description: INTERVENTIONS:  - Monitor WBC    Outcome: Progressing     Problem: SAFETY ADULT  Goal: Patient will remain free of falls  Description: INTERVENTIONS:  - Educate patient/family on patient safety including physical limitations  - Instruct patient to call for assistance with activity   - Consult OT/PT to assist with strengthening/mobility   - Keep Call bell within reach  - Keep bed low and locked with side rails adjusted as appropriate  - Keep care items and personal belongings within reach  - Initiate and maintain comfort rounds  - Make Fall Risk Sign visible to staff  - Offer Toileting every 2 Hours,  in advance of need  - Initiate/Maintain   alarm  - Obtain necessary fall risk management equipment:     - Apply yellow socks and bracelet for high fall risk patients  - Consider moving patient to room near nurses station  Outcome: Progressing  Goal: Maintain or return to baseline ADL function  Description: INTERVENTIONS:  -  Assess patient's ability to carry out ADLs; assess patient's baseline for ADL function and identify physical deficits which impact ability to perform ADLs (bathing, care of mouth/teeth, toileting, grooming, dressing, etc.)  - Assess/evaluate cause of self-care deficits   - Assess range of motion  - Assess patient's mobility; develop plan if impaired  - Assess patient's need for assistive devices and provide as appropriate  - Encourage maximum independence but intervene and supervise when necessary  - Involve family in performance of ADLs  - Assess for home care needs following discharge   - Consider OT consult to assist with ADL evaluation and planning for discharge  - Provide patient education as appropriate  Outcome: Progressing  Goal: Maintains/Returns to pre admission functional level  Description: INTERVENTIONS:  - Perform AM-PAC 6 Click Basic Mobility/ Daily Activity assessment daily.  - Set and communicate daily mobility goal to care team and patient/family/caregiver.   - Collaborate with rehabilitation services on mobility goals if consulted  - Perform Range of Motion    times a day.  - Reposition patient every    hours.  - Dangle patient    times a day  - Stand patient    times a day  - Ambulate patient    times a day  - Out of bed to chair    times a day   - Out of bed for meals      times a day  - Out of bed for toileting  - Record patient progress and toleration of activity level   Outcome: Progressing     Problem: DISCHARGE PLANNING  Goal: Discharge to home or other facility with appropriate resources  Description: INTERVENTIONS:  - Identify barriers to discharge w/patient and  caregiver  - Arrange for needed discharge resources and transportation as appropriate  - Identify discharge learning needs (meds, wound care, etc.)  - Arrange for interpretive services to assist at discharge as needed  - Refer to Case Management Department for coordinating discharge planning if the patient needs post-hospital services based on physician/advanced practitioner order or complex needs related to functional status, cognitive ability, or social support system  Outcome: Progressing     Problem: Knowledge Deficit  Goal: Patient/family/caregiver demonstrates understanding of disease process, treatment plan, medications, and discharge instructions  Description: Complete learning assessment and assess knowledge base.  Interventions:  - Provide teaching at level of understanding  - Provide teaching via preferred learning methods  Outcome: Progressing

## 2025-01-29 NOTE — UTILIZATION REVIEW
Initial Clinical Review    Elective OP surgical procedure  Age/Sex: 89 y.o. male  Surgery Date: 01/28/2025  Procedure: Right - ARTHROPLASTY KNEE TOTAL   Anesthesia: Spinal with iPACK block and supplemental local utilizing combination of Marcaine, Toradol and morphine   Operative Findings:   At the time of the procedure the patient was noted to have advanced degenerative disease of the right knee. Knee replacement was performed utilizing the [employer]une system with a size 8 femoral component, size 7 tibial baseplate with 6 mm articular surface and a 38 mm all poly patella. At the conclusion of the procedure the patient had full extension and flexion to 130 degrees with stable collateral ligament testing and mid range flexion and full extension. The patella was noted to track centrally in the trochlear groove.     01/29/2025  POD#1 Progress Note: POD #1 right TKA. Ambulatory dysfunction. PT/OT.  Continue IV flds.  I&O q8h.  Neurovascular checks q4h.  BMP AM draw x 3 days.  PT/OT.  Ice to affected area.      Admission Orders: Date/Time/Statement:   No orders of the defined types were placed in this encounter.    Diet: Jin/CHO controlled diet  Mobility: Beginning POD#0 WBAT  DVT Prophylaxis: Lovenox / Herbert SCDs    Medications/Pain Control:   Scheduled Medications:  acetaminophen, 975 mg, Oral, Q8H PARI  amLODIPine, 5 mg, Oral, Daily  ascorbic acid, 500 mg, Oral, BID  Cholecalciferol, 1,000 Units, Oral, Daily  docusate sodium, 100 mg, Oral, BID  ferrous sulfate, 325 mg, Oral, Daily With Breakfast  folic acid, 1 mg, Oral, Daily  furosemide, 20 mg, Oral, Daily  insulin lispro, 1-5 Units, Subcutaneous, TID AC  latanoprost, 1 drop, Both Eyes, HS  lisinopril, 5 mg, Oral, Daily  multivitamin-minerals, 1 tablet, Oral, Daily  timolol, 1 drop, Both Eyes, Daily  zinc sulfate, 220 mg, Oral, Daily      Continuous IV Infusions:  lactated ringers infusion  Rate: 125 mL/hr Dose: 125 mL/hr  Freq: Continuous Route: IV  Indications of Use:  IV Hydration  Last Dose: 125 mL/hr (01/29/25 0308)  Start: 01/28/25 1845  lactated ringers infusion  Rate: 125 mL/hr Dose: 125 mL/hr  Freq: Continuous Route: IV  Indications of Use: IV Hydration  Last Dose: Stopped (01/28/25 1100)  Start: 01/28/25 0630 End: 01/28/25 1833    PRN Meds:  hydrALAZINE, 5 mg, Intravenous, Q6H PRN  lactated ringers, 1,000 mL, Intravenous, Once PRN   And  lactated ringers, 1,000 mL, Intravenous, Once PRN  lactated ringers, 1,000 mL, Intravenous, Once PRN   And  lactated ringers, 1,000 mL, Intravenous, Once PRN  oxyCODONE, 10 mg, Oral, Q6H PRN  oxyCODONE, 5 mg, Oral, Q6H PRN  sodium chloride, 1,000 mL, Intravenous, Once PRN   And  sodium chloride, 1,000 mL, Intravenous, Once PRN  sodium chloride, 1,000 mL, Intravenous, Once PRN   And  sodium chloride, 1,000 mL, Intravenous, Once PRN  morphine injection  Freq: As needed    x 1 dose 1/28  Start: 01/28/25 0915 End: 01/28/25 1003    Vital Signs (last 3 days)       Date/Time Temp Pulse Resp BP MAP (mmHg) SpO2 O2 Flow Rate (L/min) O2 Device Cardiac (WDL) Patient Position - Orthostatic VS Jorje Coma Scale Score Pain    01/29/25 09:54:50 -- 78 -- 142/64 90 89 % -- -- -- -- -- --    01/29/25 09:40:05 -- 67 -- 151/61 91 90 % -- -- -- -- -- --    01/29/25 09:32:14 -- 70 -- 154/60 91 88 % -- -- -- -- -- --    01/29/25 09:28:26 -- 79 -- 155/69 98 89 % -- -- -- -- -- --    01/29/25 09:24:16 -- 75 -- 171/78 109 89 % -- -- -- -- -- --    01/29/25 09:22:06 -- 77 -- 161/76 104 91 % -- -- -- -- -- --    01/29/25 09:19:56 -- 85 -- 182/89 120 90 % -- -- -- -- -- --    01/29/25 0905 -- -- -- -- -- -- -- -- -- -- -- 5    01/29/25 07:25:28 97.7 °F (36.5 °C) 85 18 166/77 107 92 % -- -- -- -- -- --    01/29/25 0700 97.5 °F (36.4 °C) 73 16 149/65 93 91 % -- None (Room air) -- Lying -- --    01/29/25 0512 -- -- -- -- -- -- -- -- -- -- -- Med Not Given for Pain - for MAR use only    01/29/25 02:58:46 97.7 °F (36.5 °C) 89 16 162/75 104 88 % -- -- -- -- -- --     01/28/25 2300 97.7 °F (36.5 °C) -- 16 148/64 92 -- -- None (Room air) -- Lying -- --    01/28/25 2200 97.5 °F (36.4 °C) 63 16 155/52 86 90 % -- None (Room air) -- Lying -- --    01/28/25 2100 97.5 °F (36.4 °C) 78 16 155/69 98 93 % -- None (Room air) -- Lying -- --    01/28/25 20:56:29 -- -- -- -- -- 92 % -- -- -- -- -- --    01/28/25 2039 -- -- -- -- -- -- -- None (Room air) -- -- 15 3    01/28/25 2000 97.5 °F (36.4 °C) 72 16 156/69 98 90 % -- None (Room air) -- Lying -- --    01/28/25 1930 -- -- -- -- -- -- -- -- -- -- -- 3    01/28/25 19:27:27 -- -- -- -- -- 93 % -- -- -- -- -- --    01/28/25 19:27:10 -- -- -- -- -- 93 % -- -- -- -- -- --    01/28/25 1900 97.5 °F (36.4 °C) 76 16 145/67 93 92 % -- None (Room air) -- Lying -- --    01/28/25 1758 -- -- -- -- -- 91 % -- None (Room air) -- -- 15 No Pain    01/28/25 1739 -- -- -- -- -- -- -- -- -- -- -- Med Not Given for Pain - for MAR use only    01/28/25 17:33:41 97 °F (36.1 °C) 80 18 179/73 108 92 % -- -- -- -- -- --    01/28/25 1710 98.2 °F (36.8 °C) 75 26 151/65 93 96 % -- None (Room air) X -- 15 2    01/28/25 1650 -- 72 26 149/64 92 97 % -- None (Room air) X -- 15 No Pain    01/28/25 1635 -- 69 24 136/61 88 93 % -- None (Room air) X -- 15 No Pain    01/28/25 1620 -- 67 24 143/61 88 94 % -- None (Room air) X -- 15 No Pain    01/28/25 1605 -- 62 28 116/58 84 94 % -- None (Room air) X -- 15 No Pain    01/28/25 1550 -- 66 25 116/84 78 100 % 2 L/min Nasal cannula X -- 15 No Pain    01/28/25 1545 -- -- -- -- -- -- -- -- -- -- -- 4    01/28/25 1535 97.7 °F (36.5 °C) 65 20 116/58 84 100 % 2 L/min Nasal cannula X -- 15 No Pain    01/28/25 1530 -- 66 20 116/58 84 100 % 2 L/min Nasal cannula -- -- -- --    01/28/25 1519 -- 66 20 120/56 81 100 % 3 L/min Nasal cannula -- -- -- --    01/28/25 1500 -- 64 21 110/54 78 100 % 3 L/min Nasal cannula -- -- -- --    01/28/25 1445 -- 58 20 108/52 -- 99 % 3 L/min Nasal cannula -- -- 15 --    01/28/25 1444 -- -- -- -- -- 92 % 2 L/min  Nasal cannula -- -- -- --    01/28/25 1438 -- 62 -- 89/48 66 91 % -- -- -- -- -- --    01/28/25 1433 -- 57 -- 76/34 -- -- -- -- -- -- -- --    01/28/25 1430 -- 57 -- 74/32 -- -- -- -- -- -- -- --    01/28/25 1428 -- 55 -- 71/33 46 93 % -- -- -- -- -- --    01/28/25 1426 -- 54 -- 67/37 47 95 % -- -- -- -- -- --    01/28/25 1425 97.6 °F (36.4 °C) 55 22 66/37 47 95 % -- None (Room air) -- -- -- 4 01/28/25 1337 -- -- -- 141/63 90 -- -- -- -- Sitting -- --    01/28/25 1324 -- 82 -- 142/66 95 96 % -- -- -- Sitting -- --    01/28/25 1320 -- 74 -- 150/61 97 97 % -- None (Room air) -- Lying -- --    01/28/25 1315 -- 72 22 158/70 100 98 % -- None (Room air) -- -- -- --    01/28/25 1309 -- -- -- -- -- -- -- -- -- -- -- No Pain    01/28/25 1308 -- -- -- -- -- -- -- -- -- -- -- No Pain    01/28/25 1301 -- 66 22 162/69 99 98 % -- None (Room air) -- -- -- --    01/28/25 1231 -- 75 22 165/71 102 98 % -- None (Room air) -- -- -- --    01/28/25 1215 -- 84 22 208/86 -- 94 % -- None (Room air) -- -- -- 4    01/28/25 1205 -- 66 21 182/72 -- 94 % -- None (Room air) -- -- -- --    01/28/25 1200 -- 62 22 151/68 -- 96 % -- None (Room air) -- -- -- --    01/28/25 1150 97.6 °F (36.4 °C) 61 21 163/73 -- 94 % -- None (Room air) -- -- 15 4    01/28/25 1140 -- 62 22 184/77 -- 94 % -- None (Room air) -- -- -- 4    01/28/25 1125 -- 65 22 179/72 103 -- -- -- -- -- -- --    01/28/25 1122 97.6 °F (36.4 °C) 61 22 156/67 97 92 % -- None (Room air) X -- 15 3    01/28/25 1121 97.6 °F (36.4 °C) 60 22 156/67 97 93 % -- None (Room air) X -- 15 3    01/28/25 1119 -- 62 29 162/72 103 93 % -- -- -- -- -- --    01/28/25 1106 97.6 °F (36.4 °C) 62 24 171/70 100 93 % -- None (Room air) X -- 15 3    01/28/25 1051 97.5 °F (36.4 °C) 59 28 124/59 85 94 % -- None (Room air) X -- 15 No Pain    01/28/25 1036 97.5 °F (36.4 °C) 58 23 144/64 92 94 % -- None (Room air) X -- 15 No Pain    01/28/25 1021 98.7 °F (37.1 °C) 65 23 140/62 89 96 % 6 L/min EtCO2 mask X -- 15 No  Pain    01/28/25 1006 98.8 °F (37.1 °C) 66 23 102/50 72 97 % 6 L/min EtCO2 mask X -- 7 --    01/28/25 0637 98.5 °F (36.9 °C) 69 18 196/84 -- 96 % -- None (Room air) -- -- -- 7          Weight (last 2 days)       Date/Time Weight    01/28/25 0637 90.6 (199.7)            Pertinent Labs/Diagnostic Test Results:   Radiology:  No orders to display     Cardiology:  No orders to display     GI:  No orders to display       Results from last 7 days   Lab Units 01/29/25  0514   WBC Thousand/uL 8.35   HEMOGLOBIN g/dL 10.5*   HEMATOCRIT % 31.6*   PLATELETS Thousands/uL 159     Results from last 7 days   Lab Units 01/29/25  0514   SODIUM mmol/L 132*   POTASSIUM mmol/L 4.7   CHLORIDE mmol/L 103   CO2 mmol/L 28   ANION GAP mmol/L 1*   BUN mg/dL 22   CREATININE mg/dL 0.89   EGFR ml/min/1.73sq m 76   CALCIUM mg/dL 8.9     Results from last 7 days   Lab Units 01/29/25  0724 01/28/25  2054 01/28/25  1027 01/28/25  0644   POC GLUCOSE mg/dl 142* 191* 147* 77     Results from last 7 days   Lab Units 01/29/25  0514   GLUCOSE RANDOM mg/dL 168*     Network Utilization Review Department  ATTENTION: Please call with any questions or concerns to [phone] and carefully listen to the prompts so that you are directed to the right person. All voicemails are confidential.   For Discharge needs, contact Care Management DC Support Team at [phone] opt. 2  Send all requests for admission clinical reviews, approved or denied determinations and any other requests to dedicated fax number below belonging to the campus where the patient is receiving treatment. List of dedicated fax numbers for the Facilities:  FACILITY NAME UR FAX NUMBER   ADMISSION DENIALS (Administrative/Medical Necessity) [phone]   DISCHARGE SUPPORT TEAM (NETWORK) [phone]   PARENT CHILD HEALTH (Maternity/NICU/Pediatrics) [phone]   [facility] [phone]   [facility] [phone]   [facility]  [facility] [phone]   [facility] [phone]   [facility] [phone]   [facility] [phone]   [facility] [phone]   [facility] [phone]   [facility] [phone]   [facility] [phone]   [facility] [phone]   [facility] [phone]

## 2025-01-29 NOTE — PROGRESS NOTES
Progress Note - Orthopedics   Name: Toni Mahmood 89 y.o. male I MRN: [id number]  Unit/Bed#: -01 I Date of Admission: 1/28/2025   Date of Service: 1/29/2025 I Hospital Day: 0     Assessment & Plan  Primary osteoarthritis of right knee  POD #1 right TKA.  Ambulatory dysfunction.  PT/OT and advance with discharge planning.  Patient is orthopedically stable for discharge once medically cleared and passes physical therapy.  Benign prostatic hyperplasia without urinary obstruction    Essential hypertension    Type 2 diabetes mellitus (HCC)    Lab Results   Component Value Date    HGBA1C 7.4 (H) 01/09/2025           Subjective   89 y.o.male POD #1 right TKA.  Patient placed in observation secondary to postoperative hypotension.  He is doing well today denying any lightheadedness, chest pain, dizziness or shortness of breath.  He has been out of bed to the bathroom without difficulty yesterday and this morning.  He is tolerating his diet.  He denies any lower extremity paresthesias or pain of any significance.    Objective :  Temp:  [97 °F (36.1 °C)-98.8 °F (37.1 °C)] 97.7 °F (36.5 °C)  HR:  [54-89] 85  BP: ()/(32-86) 166/77  Resp:  [16-29] 18  SpO2:  [88 %-100 %] 92 %  O2 Device: None (Room air)    Physical Exam  Musculoskeletal: The right lower extremity dressings and Ace bandage are clean, dry and intact.  He remains able to independently straight leg raise.  He lacks a few degrees of terminal extension.  He had flexion to about 60 degrees.  Calf compartments are soft, nontender and compressible.  Distal sensation is intact.  He is actively moving his toes and ankle.      Lab Results: I have reviewed the following results:  Recent Labs     01/29/25  0514   WBC 8.35   HGB 10.5*   HCT 31.6*      BUN 22   CREATININE 0.89

## 2025-01-29 NOTE — PLAN OF CARE
Problem: PHYSICAL THERAPY ADULT  Goal: Performs mobility at highest level of function for planned discharge setting.  See evaluation for individualized goals.  Description: Treatment/Interventions: ADL retraining, Functional transfer training, LE strengthening/ROM, Elevations, Therapeutic exercise, Endurance training, Patient/family training, Equipment eval/education, Bed mobility, Gait training, Compensatory technique education, Spoke to nursing, Spoke to case management, OT  Equipment Recommended: Walker       See flowsheet documentation for full assessment, interventions and recommendations.  1/29/2025 1645 by Keyla Chauhan, PT  Note: Prognosis: Good  Problem List: Decreased strength, Decreased range of motion, Decreased endurance, Impaired balance, Decreased mobility, Decreased safety awareness, Obesity, Decreased skin integrity, Pain  Assessment: Pt tolerated session fairly well. He reports stiffness initially requiring increased assistance to complete transfers and ambulation however as mobility increased, patient able to complete with supervision. He demonstrated improved turning this AM and demonstrated improved gait pattern with use of RW compared to SW with cues for inc knee extension in stance. He fatigues quickly and requires increased sitting rest breaks and cues for breathing technique to recover DIAMOND. He continues to have elevated and fluctuating BP although asymptomatic. He requires cues for proper completion of LE TE. Right knee flexion ~ 60 deg supine, 80-90 deg max sitting EOB. Knee ext supine ~-5-10 deg. He is motivated to return to home with OPPT services but limited by decreased strength and ROM, balance and endurance and BP. He will continue to benefit from PT services to maximize LOF.     Barriers to Discharge Comments: fall risk, need for assistance with mobility initially reporting stiffness. HTN  Rehab Resource Intensity Level, PT: III (Minimum Resource Intensity) (OPPT)    See flowsheet  documentation for full assessment.

## 2025-01-29 NOTE — ASSESSMENT & PLAN NOTE
PTA is on lisinopril, Lasix and amlodipine  Took amlodipine this morning, Lasix and lisinopril last taken yesterday    Postoperatively had hypertension and was administered IV hydralazine x 10 mg with subsequent significant hypotension  Administered IV fluid boluses with improvement    Mildly hypertensive this morning when working with PT, home medications have been resumed  Blood pressure improving  Pending reevaluation with PT later this afternoon, medically cleared for discharge as long as blood pressures remain stable

## 2025-01-30 ENCOUNTER — OFFICE VISIT (OUTPATIENT)
Dept: PHYSICAL THERAPY | Facility: CLINIC | Age: 89
End: 2025-01-30
Payer: COMMERCIAL

## 2025-01-30 ENCOUNTER — TELEPHONE (OUTPATIENT)
Dept: OBGYN CLINIC | Facility: HOSPITAL | Age: 89
End: 2025-01-30

## 2025-01-30 DIAGNOSIS — R26.9 GAIT ABNORMALITY: Primary | ICD-10-CM

## 2025-01-30 DIAGNOSIS — Z96.651 H/O TOTAL KNEE REPLACEMENT, RIGHT: ICD-10-CM

## 2025-01-30 DIAGNOSIS — M17.0 PRIMARY OSTEOARTHRITIS OF BOTH KNEES: ICD-10-CM

## 2025-01-30 PROCEDURE — 97110 THERAPEUTIC EXERCISES: CPT | Performed by: PHYSICAL THERAPIST

## 2025-01-30 PROCEDURE — 97164 PT RE-EVAL EST PLAN CARE: CPT | Performed by: PHYSICAL THERAPIST

## 2025-01-30 PROCEDURE — 97535 SELF CARE MNGMENT TRAINING: CPT | Performed by: PHYSICAL THERAPIST

## 2025-01-30 NOTE — TELEPHONE ENCOUNTER
"Patient contacted for a postoperative follow up assessment. Patient reports resting, laying in bed currently. He reports \"so, so.\"  Patient states current pain level of a  4/10, and reports ambulating with the  RW.   Patient denies increase in swelling and dressing is clean, dry and intact. We discussed starting to ICE, we discussed postoperative swelling, continuing to ICE areas of pain/swelling, especially after increased activity over the next few weeks.       He was supposed to have PT yesterday, and no further appt scheduled. I will reach out to OW location and PT coordinator for assistance (Katarina Manley).     We reviewed patients AVS medication list. Patient is taking Tylenol 1000mg every 8 hours, Oxycodone 5mg PRN, ASA 325mg BID, and I recommended he start an OTC stool softener, as he planned to, today- until going regularly.     Patient denies nausea, vomiting, abdominal pain, chest pain, shortness of breath, fever, dizziness and calf pain. Patient does not have any other questions or concerns at this time. Pt was encouraged to call with any questions, concerns or issues.    "

## 2025-01-30 NOTE — PROGRESS NOTES
PT Re-Evaluation     Today's date: 2025  Patient name: Toni Mahmood  : 1935  MRN: [id number]  Referring provider: Brandon Bunn DO  Dx:   Encounter Diagnosis     ICD-10-CM    1. Gait abnormality  R26.9       2. Primary osteoarthritis of both knees  M17.0       3. H/O total knee replacement, right  Z96.651                      Assessment  Impairments: abnormal gait, abnormal or restricted ROM, activity intolerance, impaired balance, impaired physical strength, lacks appropriate home exercise program, pain with function, unable to perform ADL and activity limitations    Assessment details: PT notes the patient with decrease ROM and strength t/o the right knee and LE with demonstration of impaired gait pattern and balance s/p right TKA leading to increase pain levels and functional limitations with need for a course of p/o OPPT for 8 weeks with focus on gait/balance, manual therapy, strengthening, analgesic modalities, and update/review of HEP.    Understanding of Dx/Px/POC: good     Prognosis: good    Goals  ST.  Initiate HEP  2.  Decrease pain levels by 25-50%   3.  Increase ROM by 25-50%   4.  Increase strength by 1-2 mm grades  LT.  Improve gait pattern and balance to good/normal level with least restrictive AD  2.  Decrease limitations with standing, walking and stairs  3.  Decrease limitations with transfers and ADL  4.  Decrease limitations with squatting, lifting and carrying  5.  DC with HEP      Plan  Patient would benefit from: skilled physical therapy  Planned modality interventions: cryotherapy    Planned therapy interventions: manual therapy, neuromuscular re-education, patient/caregiver education, self care, strengthening, stretching, therapeutic exercise, balance, balance/weight bearing training, flexibility, gait training, graded exercise and home exercise program    Frequency: 2x week  Duration in weeks: 8  Treatment plan discussed with: patient  Plan details: PT notes  review of POC and findings with the patient who is in agreement with PT recommendations of course of skilled therapy.       Subjective Evaluation    History of Present Illness  Mechanism of injury: Patient underwent right TKA on 25 and states controlled pain levels in the right knee with present medications.  Patient feels the knee is stiff with limitations with transfers, standing, walking, ADL, balance, stairs, and sleep.    Patient Goals  Patient goals for therapy: decreased pain, improved balance, increased motion, increased strength and independence with ADLs/IADLs    Pain  Current pain ratin  At best pain ratin  At worst pain ratin  Location: Right Knee  Quality: sharp, dull ache and discomfort  Relieving factors: rest  Aggravating factors: stair climbing, walking, standing and lifting    Treatments  Previous treatment: injection treatment  Current treatment: injection treatment and physical therapy  Discharged from (in last 30 days): inpatient hospitalization        Objective     Observations     Right Knee   Positive for incision.     Additional Observation Details  PT notes well healing surgical scar with no signs of infection but ecchymosis noted t/o the medial knee joint and LE     Palpation     Right   Tenderness of the vastus lateralis and vastus medialis.     Tenderness     Right Knee   Tenderness in the lateral joint line, MCL (distal), MCL (proximal), medial joint line, patellar tendon and quadriceps tendon. No tenderness in the LCL (distal) and LCL (proximal).     Neurological Testing     Reflexes   Left   Patellar (L4): trace (1+)  Achilles (S1): trace (1+)    Right   Patellar (L4): trace (1+)  Achilles (S1): trace (1+)    Active Range of Motion     Right Hip   Flexion: 29 degrees   Extension: -2 degrees   Abduction: WFL  External rotation (90/90): WFL  Internal rotation (90/90): WFL    Right Knee   Flexion: 75 degrees with pain  Extension: -11 degrees with pain    Additional  Active Range of Motion Details  PT notes decrease ROM and strength t/o the right knee and LE     Passive Range of Motion     Right Hip   Flexion: 52 degrees   Extension: 1 degrees     Right Knee   Flexion: 84 degrees with pain  Extension: -8 degrees with pain    Mobility   Patellar Mobility:     Right Knee   Hypomobile: medial, lateral, superior and inferior     Strength/Myotome Testing     Right Hip   Planes of Motion   Flexion: 3+  Extension: 4  Abduction: 4-  Adduction: 4+  External rotation: 4-  Internal rotation: 4-    Right Knee   Flexion: 4  Extension: 3+  Quadriceps contraction: fair    Tests     Right Hip   Negative NATHAN and DIMITRI.   Chester: Positive.   Modified Chester: Positive.   90/90 SLR: Positive.   SLR: Positive.     Right Knee   Negative anterior Lachman, lateral Reina, medial Reina, posterior Lachman, valgus stress test at 0 degrees and varus stress test at 0 degrees.     Swelling     Left Knee Girth Measurement (cm)   Joint line: 40 cm    Right Knee Girth Measurement (cm)   Joint line: 43 cm    Ambulation   Weight-Bearing Status   Weight-Bearing Status (Right): weight-bearing as tolerated    Assistive device used: front-wheeled walker    Observational Gait   Gait: antalgic   Decreased walking speed, stride length and right stance time.              Precautions:  WBAT Right LE, PMH HTN, T2DM, OA  POC 3/1/25      Manuals 1/22 1/30      Right knee flex and ext   5 min       Right HS, hip ABD, quad, piriformis, and gastroc with manual hip traction                         Neuro Re-Ed        Front and Lateral Lunge         STS- No UE        Bridge with ABD         Rocker board- Tandem F/B and S/S         BOSU March                         Ther Ex        Nu-step for ROM and strength   10 min L1       Heel slides with strap        Quad set         Supine SLR         LAQ         F/L step up         Stair HR/TR         HEP update/review  15 min  15 min       Ther Activity                        Gait  Training                        Modalities        CP  PRN  10 min Right Knee

## 2025-02-03 NOTE — PROGRESS NOTES
"Daily Note     Today's date: 2025  Patient name: Toni Mahmood  : 1935  MRN: [id number]  Referring provider: Brandon Bunn DO  Dx:   Encounter Diagnosis     ICD-10-CM    1. Gait abnormality  R26.9       2. Primary osteoarthritis of both knees  M17.0       3. H/O total knee replacement, right  Z96.651                      Subjective:  Patient reports he is compliant with HEP and feels no increase in symptoms with performance.  Patient reports overall constant soreness of 4-5/10 level t/o the day.       Objective: See treatment diary below      Assessment: Tolerated treatment well.  PT notes start of POC with focus on gait/balance and manual therapy to decrease ROM and strength t/o the right knee with demonstration of impaired gait pattern and balance s/p right TKA with need for continuation of skilled therapy.        Plan: Continue per plan of care.      Precautions:  WBAT Right LE, PMH HTN, T2DM, OA  POC 3/1/25      Manuals  2/4     Right knee flex and ext   5 min  5 min      Right HS, hip ABD, quad, piriformis, and gastroc with manual hip traction    10 min                      Neuro Re-Ed        Front and Lateral Lunge         STS- No UE        Bridge with ABD    2x10 Green      Rocker board- Tandem F/B and S/S         BOSU March                         Ther Ex        Nu-step for ROM and strength   10 min L1  10 min L3      Heel slides with strap   HEP      Quad set    10x5\" Hold      Supine SLR    10X Right Only      LAQ    2x10   3\" Hold      F/L step up    10x Each Bilat   4\" step      Stair HR/TR    2x10 Bilat  4\" step      HEP update/review  15 min  15 min       Ther Activity                        Gait Training                        Modalities        CP  PRN  10 min Right Knee  15 min Right Knee                   "

## 2025-02-04 ENCOUNTER — OFFICE VISIT (OUTPATIENT)
Dept: PHYSICAL THERAPY | Facility: CLINIC | Age: 89
End: 2025-02-04
Payer: COMMERCIAL

## 2025-02-04 DIAGNOSIS — Z96.651 H/O TOTAL KNEE REPLACEMENT, RIGHT: ICD-10-CM

## 2025-02-04 DIAGNOSIS — R26.9 GAIT ABNORMALITY: Primary | ICD-10-CM

## 2025-02-04 DIAGNOSIS — M17.0 PRIMARY OSTEOARTHRITIS OF BOTH KNEES: ICD-10-CM

## 2025-02-04 PROCEDURE — 97110 THERAPEUTIC EXERCISES: CPT | Performed by: PHYSICAL THERAPIST

## 2025-02-04 PROCEDURE — 97112 NEUROMUSCULAR REEDUCATION: CPT | Performed by: PHYSICAL THERAPIST

## 2025-02-04 PROCEDURE — 97140 MANUAL THERAPY 1/> REGIONS: CPT | Performed by: PHYSICAL THERAPIST

## 2025-02-04 NOTE — PROGRESS NOTES
"Daily Note     Today's date: 2025  Patient name: Toni Mahmood  : 1935  MRN: [id number]  Referring provider: Brandon Bunn DO  Dx:   Encounter Diagnosis     ICD-10-CM    1. Gait abnormality  R26.9       2. Primary osteoarthritis of both knees  M17.0       3. H/O total knee replacement, right  Z96.651                      Subjective:  Patient reports continuation of right knee soreness and stiffness.  Patient denies any increase symptoms post last session.  Patient with no increase symptoms t/o the session but states fatigue in the LE post session.       Objective: See treatment diary below      Assessment: Tolerated treatment well.  PT notes continuation of decrease ROM and strength t/o the right knee and LE with demonstration of impaired gait pattern and balance s/p right TKA with need for continuation of skilled therapy.        Plan: Continue per plan of care.      Precautions:  WBAT Right LE, PMH HTN, T2DM, OA  POC 3/1/25      Manuals //5    Right knee flex and ext   5 min  5 min  5 min     Right HS, hip ABD, quad, piriformis, and gastroc with manual hip traction    10 min  10 min                     Neuro Re-Ed        Front and Lateral Lunge         STS- No UE        Bridge with ABD    2x10 Green  2x10   Green     Rocker board- Tandem F/B and S/S     15X Each                              Ther Ex        Nu-step for ROM and strength   10 min L1  10 min L3  10 min L3     Heel slides with strap   HEP      Quad set    10x5\" Hold  HEP     Supine SLR    10X Right Only  2x10 Right Only     LAQ    2x10   3\" Hold  2x10   3\" Hold     F/L step up    10x Each Bilat   4\" step  10x Each Bilat  4\" step     Stair HR/TR    2x10 Bilat  4\" step  2x10 Bilat   4\" step     HEP update/review  15 min  15 min       Ther Activity                        Gait Training                        Modalities        CP  PRN  10 min Right Knee  15 min Right Knee  15 min Right Knee                    "

## 2025-02-05 ENCOUNTER — OFFICE VISIT (OUTPATIENT)
Dept: PHYSICAL THERAPY | Facility: CLINIC | Age: 89
End: 2025-02-05
Payer: COMMERCIAL

## 2025-02-05 DIAGNOSIS — R26.9 GAIT ABNORMALITY: Primary | ICD-10-CM

## 2025-02-05 DIAGNOSIS — Z96.651 H/O TOTAL KNEE REPLACEMENT, RIGHT: ICD-10-CM

## 2025-02-05 DIAGNOSIS — M17.0 PRIMARY OSTEOARTHRITIS OF BOTH KNEES: ICD-10-CM

## 2025-02-05 PROCEDURE — 97110 THERAPEUTIC EXERCISES: CPT | Performed by: PHYSICAL THERAPIST

## 2025-02-05 PROCEDURE — 97112 NEUROMUSCULAR REEDUCATION: CPT | Performed by: PHYSICAL THERAPIST

## 2025-02-05 PROCEDURE — 97140 MANUAL THERAPY 1/> REGIONS: CPT | Performed by: PHYSICAL THERAPIST

## 2025-02-06 ENCOUNTER — APPOINTMENT (OUTPATIENT)
Dept: PHYSICAL THERAPY | Facility: CLINIC | Age: 89
End: 2025-02-06
Payer: COMMERCIAL

## 2025-02-07 NOTE — PROGRESS NOTES
"Daily Note     Today's date: 2/10/2025  Patient name: Toni Mahmood  : 1935  MRN: [id number]  Referring provider: Brandon Bunn DO  Dx:   Encounter Diagnosis     ICD-10-CM    1. Gait abnormality  R26.9       2. Primary osteoarthritis of both knees  M17.0       3. H/O total knee replacement, right  Z96.651                      Subjective: Patient reports his knee gets stiff in the morning. He notes he has difficulty straightening his knee.        Objective: See treatment diary below      Assessment: Patient tolerated treatment well. Patient demonstrated fatigue post treatment, exhibited good technique with therapeutic exercises, and would benefit from continued PT to improve knee mobility and strength.       Plan: Continue per plan of care.      Precautions:  WBAT Right LE, PMH HTN, T2DM, OA  POC 3/1/25      Manuals 1/22 1/30 2/4 2/5 2/10   Right knee flex and ext   5 min  5 min  5 min  5 min   Right HS, hip ABD, quad, piriformis, and gastroc with manual hip traction    10 min  10 min  10 min                   Neuro Re-Ed        Front and Lateral Lunge         STS- No UE        Bridge with ABD    2x10 Green  2x10   Green  2x10 Green   Rocker board- Tandem F/B and S/S     15X Each                           Ther Ex        Nu-step for ROM and strength   10 min L1  10 min L3  10 min L3  10 min L3   Heel slides with strap   HEP      Quad set    10x5\" Hold  HEP     Supine SLR    10X Right Only  2x10 Right Only  2x10 Right Only   LAQ    2x10   3\" Hold  2x10   3\" Hold  2x10 3\" Hold   F/L step up    10x Each Bilat   4\" step  10x Each Bilat  4\" step  10x Each Bilat 4   Stair HR/TR    2x10 Bilat  4\" step  2x10 Bilat   4\" step  2x10 Bilat 4\" step   HEP update/review  15 min  15 min       Ther Activity                        Gait Training                        Modalities        CP  PRN  10 min Right Knee  15 min Right Knee  15 min Right Knee  15 min Right Knee                    "

## 2025-02-10 ENCOUNTER — OFFICE VISIT (OUTPATIENT)
Dept: OBGYN CLINIC | Facility: CLINIC | Age: 89
End: 2025-02-10

## 2025-02-10 ENCOUNTER — HOSPITAL ENCOUNTER (OUTPATIENT)
Dept: RADIOLOGY | Facility: CLINIC | Age: 89
Discharge: HOME | End: 2025-02-10
Payer: COMMERCIAL

## 2025-02-10 ENCOUNTER — OFFICE VISIT (OUTPATIENT)
Dept: PHYSICAL THERAPY | Facility: CLINIC | Age: 89
End: 2025-02-10
Payer: COMMERCIAL

## 2025-02-10 VITALS — WEIGHT: 199 LBS | HEIGHT: 66 IN | BODY MASS INDEX: 31.98 KG/M2

## 2025-02-10 DIAGNOSIS — R26.9 GAIT ABNORMALITY: Primary | ICD-10-CM

## 2025-02-10 DIAGNOSIS — Z96.651 STATUS POST TOTAL RIGHT KNEE REPLACEMENT: ICD-10-CM

## 2025-02-10 DIAGNOSIS — Z96.651 STATUS POST TOTAL RIGHT KNEE REPLACEMENT: Primary | ICD-10-CM

## 2025-02-10 DIAGNOSIS — M17.0 PRIMARY OSTEOARTHRITIS OF BOTH KNEES: ICD-10-CM

## 2025-02-10 DIAGNOSIS — Z96.651 H/O TOTAL KNEE REPLACEMENT, RIGHT: ICD-10-CM

## 2025-02-10 PROCEDURE — 97110 THERAPEUTIC EXERCISES: CPT

## 2025-02-10 PROCEDURE — 73562 X-RAY EXAM OF KNEE 3: CPT

## 2025-02-10 PROCEDURE — 99024 POSTOP FOLLOW-UP VISIT: CPT | Performed by: ORTHOPAEDIC SURGERY

## 2025-02-10 PROCEDURE — 97140 MANUAL THERAPY 1/> REGIONS: CPT

## 2025-02-10 PROCEDURE — 97112 NEUROMUSCULAR REEDUCATION: CPT

## 2025-02-10 NOTE — PROGRESS NOTES
Name: Toni Mahmood      : 1935      MRN: [id number]  Encounter Provider: Brandon Bunn DO  Encounter Date: 2/10/2025   Encounter department: [facility] [place]  :  Assessment & Plan  Status post total right knee replacement  Patient is to continue his formal physical therapy and doing exercises on.  He no longer needs a dressing to his knee.  He may shower but not soak or submerge the incision in water.  He is to let the skin glue fall off on its own.  Recommend continuing aspirin for another 1 to 2 weeks due to his somewhat limited mobility.  Increasing his mobility and increasing water ingestion will aid in his slowed bowel habits as well.  Patient will be seen back in 4 weeks the orthopedic office.  Continue Tylenol 1000 mg every 8 hours as needed.  Patient to work on his extension range of motion and not have a pillow under his knees so much.  Orders:    XR knee 3 vw right non injury; Future        History of Present Illness   HPI  Toni Mahmood is a 89 y.o. male who presents stop visit status post right total knee arthroplasty, DOS 2025, 13 days ago.    Review of Systems  Current Outpatient Medications on File Prior to Visit   Medication Sig Dispense Refill    acetaminophen (TYLENOL) 500 mg tablet Take 1 tablet (500 mg total) by mouth every 8 (eight) hours 30 tablet 1    amLODIPine (NORVASC) 2.5 mg tablet Take 5 mg by mouth daily      ascorbic acid (VITAMIN C) 500 MG tablet Take 1 tablet (500 mg total) by mouth 2 (two) times a day 120 tablet 0    aspirin 325 mg tablet Take 1 tablet (325 mg total) by mouth every 12 (twelve) hours 60 tablet 0    cholecalciferol (VITAMIN D3) 25 mcg (1,000 units) tablet Take 1 tablet (1,000 Units total) by mouth daily 60 tablet 0    ferrous sulfate 325 (65 Fe) mg tablet Take 325 mg by mouth daily with breakfast      folic acid (FOLVITE) 1 mg tablet Take 1 tablet (1 mg total) by mouth daily 60 tablet 0    furosemide (LASIX) 20 mg tablet  "Take 20 mg by mouth daily      latanoprost (XALATAN) 0.005 % ophthalmic solution       lisinopril (ZESTRIL) 20 mg tablet       metFORMIN (GLUCOPHAGE) 500 mg tablet TAKE 1 TABLET BY MOUTH TWICE DAILY WITH MORNING MEAL AND WITH EVENING MEAL      Multiple Vitamins-Minerals (multivitamin with minerals) tablet Take 1 tablet by mouth daily 60 tablet 0    potassium chloride (MICRO-K) 10 MEQ CR capsule Take 10 mEq by mouth daily      timolol (TIMOPTIC) 0.5 % ophthalmic solution       zinc sulfate (ZINCATE) 220 mg capsule Take 1 capsule (220 mg total) by mouth daily 60 capsule 0     No current facility-administered medications on file prior to visit.      Social History     Tobacco Use    Smoking status: Never    Smokeless tobacco: Never    Tobacco comments:     Smoked at age 23 for about a year   Vaping Use    Vaping status: Never Used   Substance and Sexual Activity    Alcohol use: Not Currently     Comment: Occasional Beer    Drug use: Never    Sexual activity: Not on file      Objective   Ht 5' 6\" (1.676 m)   Wt 90.3 kg (199 lb)   BMI 32.12 kg/m²      Physical Exam    Right knee incision to be healing well without Default.  The Mepilex dressing was removed there is no signs of infection.  There is minimal effusion.  There is resolving medial thigh ecchymosis.  There is no anterior thigh or calf pain.  Negative Homans' sign.  Motion is approximately 10 to 95 degrees plantar dorsiflexion.  No gross instability.    I personally viewed x-rays of the right knee taken the office today which document strictly position alignment of the total knee prosthesis without signs of fracture.  "

## 2025-02-10 NOTE — PATIENT INSTRUCTIONS
Patient is to continue his formal physical therapy and doing exercises on.  He no longer needs a dressing to his knee.  He may shower but not soak or submerge the incision in water.  He is to let the skin glue fall off on its own.  Recommend continuing aspirin for another 1 to 2 weeks due to his somewhat limited mobility.  Increasing his mobility and increasing water ingestion will aid in his slowed bowel habits as well.  Patient will be seen back in 4 weeks the orthopedic office.  Continue Tylenol 1000 mg every 8 hours as needed.  Patient to work on his extension range of motion and not have a pillow under his knees so much.

## 2025-02-12 NOTE — PROGRESS NOTES
"Daily Note     Today's date: 2025  Patient name: Toni Mahmood  : 1935  MRN: [id number]  Referring provider: Brandon Bunn DO  Dx:   Encounter Diagnosis     ICD-10-CM    1. Gait abnormality  R26.9       2. Primary osteoarthritis of both knees  M17.0       3. H/O total knee replacement, right  Z96.651                      Subjective:  Patient reports having pain and pulling in the right thigh with standing and walking activities.  Patient reports slight relief of symptoms post session and will try to be more aware of TKE with standing and walking.         Objective: See treatment diary below      Assessment: Tolerated treatment well.  PT notes continuation of decrease ROM and strength t/o the right knee and LE with demonstration of impaired gait pattern and balance s/p right TKA with need for continuation of skilled therapy.  PT notes focus on TKE with standing and walking to decrease knee flexion during the activity to assist with decrease strain to the right quad.        Plan: Continue per plan of care.      Precautions:  WBAT Right LE, PMH HTN, T2DM, OA  POC 3/1/25      Manuals 1/30 2/4 2/5 2/10 2/13   Right knee flex and ext  5 min  5 min  5 min  5 min 5 min    Right HS, hip ABD, quad, piriformis, and gastroc with manual hip traction   10 min  10 min  10 min 10 min with knee PA mobs                    Neuro Re-Ed        Front and Lateral Lunge         STS- No UE        Bridge with ABD   2x10 Green  2x10   Green  2x10 Green 2x10 Blue    Rocker board- Tandem F/B and S/S    15X Each  NT 20 Each             TB TKE     10x3\" Hold            Ther Ex        Nu-step for ROM and strength  10 min L1  10 min L3  10 min L3  10 min L3 10 min L4    Heel slides with strap  HEP       Quad set   10x5\" Hold  HEP      Supine SLR   10X Right Only  2x10 Right Only  2x10 Right Only 2x10 Right Only    LAQ   2x10   3\" Hold  2x10   3\" Hold  2x10 3\" Hold 2x10   3\" Hold    F/L step up   10x Each Bilat   4\" step  " "10x Each Bilat  4\" step  10x Each Bilat 4 10x Each   Bilat  4\" step    Stair HR/TR   2x10 Bilat  4\" step  2x10 Bilat   4\" step  2x10 Bilat 4\" step 2x10 Bilat   4\" step    HEP update/review  15 min        Ther Activity                        Gait Training                        Modalities        CP  10 min Right Knee  15 min Right Knee  15 min Right Knee  15 min Right Knee 15 min Right Knee                       "

## 2025-02-13 ENCOUNTER — OFFICE VISIT (OUTPATIENT)
Dept: PHYSICAL THERAPY | Facility: CLINIC | Age: 89
End: 2025-02-13
Payer: COMMERCIAL

## 2025-02-13 DIAGNOSIS — Z96.651 H/O TOTAL KNEE REPLACEMENT, RIGHT: ICD-10-CM

## 2025-02-13 DIAGNOSIS — M17.0 PRIMARY OSTEOARTHRITIS OF BOTH KNEES: ICD-10-CM

## 2025-02-13 DIAGNOSIS — R26.9 GAIT ABNORMALITY: Primary | ICD-10-CM

## 2025-02-13 PROCEDURE — 97110 THERAPEUTIC EXERCISES: CPT | Performed by: PHYSICAL THERAPIST

## 2025-02-13 PROCEDURE — 97112 NEUROMUSCULAR REEDUCATION: CPT | Performed by: PHYSICAL THERAPIST

## 2025-02-13 PROCEDURE — 97140 MANUAL THERAPY 1/> REGIONS: CPT | Performed by: PHYSICAL THERAPIST

## 2025-02-14 NOTE — PROGRESS NOTES
"Daily Note     Today's date: 2025  Patient name: Toni Mahmood  : 1935  MRN: [id number]  Referring provider: Brandon Bunn DO  Dx:   Encounter Diagnosis     ICD-10-CM    1. Gait abnormality  R26.9       2. Primary osteoarthritis of both knees  M17.0       3. H/O total knee replacement, right  Z96.651                      Subjective:  Patient reports continuation of pulling in the right thigh but overall less than last session.  Patient questions about DC use of the RW but after trial feels he is not ready for the transition.        Objective: See treatment diary below      Assessment: Tolerated treatment well.  PT notes continuation of decrease ROM and strength t/o the right knee and LE with demonstration of impaired gait pattern and balance s/p right TKA with need for continuation of skilled therapy.  PT notes focus on TKE with standing and walking to decrease knee flexion during the activity to assist with decrease strain to the right quad.  PT notes attempt to ambulate with SPC but difficulty with WB on the right LE with AD so PT with recommendation to continue with use of the RW at this time.       Plan: Continue per plan of care.      Precautions:  WBAT Right LE, PMH HTN, T2DM, OA  POC 3/1/25      Manuals / 2/5 2/10 2/13 2/17   Right knee flex and ext  5 min  5 min  5 min 5 min  5 min    Right HS, hip ABD, quad, piriformis, and gastroc with manual hip traction  10 min  10 min  10 min 10 min with knee PA mobs  10 min with knee PA mobs and right quad STM                    Neuro Re-Ed        Front and Lateral Lunge         STS- No UE        Bridge with ABD  2x10 Green  2x10   Green  2x10 Green 2x10 Blue  2x10 Blue   Rocker board- Tandem F/B and S/S   15X Each  NT 20 Each  NT             TB TKE    10x3\" Hold Blue  10x3\" Hold   Blue            Ther Ex        Nu-step for ROM and strength  10 min L3  10 min L3  10 min L3 10 min L4  10 min L4   Heel slides with strap HEP     Leg ress DL " "Only   75#   2x10    Quad set  10x5\" Hold  HEP       Supine SLR  10X Right Only  2x10 Right Only  2x10 Right Only 2x10 Right Only  2x10 Right Only   LAQ  2x10   3\" Hold  2x10   3\" Hold  2x10 3\" Hold 2x10   3\" Hold  2x10   3\" Hold   3#   F/L step up  10x Each Bilat   4\" step  10x Each Bilat  4\" step  10x Each Bilat 4 10x Each   Bilat  4\" step  10x Each   Bilat   6\" Step    Stair HR/TR  2x10 Bilat  4\" step  2x10 Bilat   4\" step  2x10 Bilat 4\" step 2x10 Bilat   4\" step  2x10 Bilat   6\" tep    HEP update/review         Ther Activity                        Gait Training                        Modalities        CP  15 min Right Knee  15 min Right Knee  15 min Right Knee 15 min Right Knee  15 min Right Knee                         "

## 2025-02-17 ENCOUNTER — OFFICE VISIT (OUTPATIENT)
Dept: PHYSICAL THERAPY | Facility: CLINIC | Age: 89
End: 2025-02-17
Payer: COMMERCIAL

## 2025-02-17 DIAGNOSIS — Z96.651 H/O TOTAL KNEE REPLACEMENT, RIGHT: ICD-10-CM

## 2025-02-17 DIAGNOSIS — M17.0 PRIMARY OSTEOARTHRITIS OF BOTH KNEES: ICD-10-CM

## 2025-02-17 DIAGNOSIS — R26.9 GAIT ABNORMALITY: Primary | ICD-10-CM

## 2025-02-17 PROCEDURE — 97112 NEUROMUSCULAR REEDUCATION: CPT | Performed by: PHYSICAL THERAPIST

## 2025-02-17 PROCEDURE — 97140 MANUAL THERAPY 1/> REGIONS: CPT | Performed by: PHYSICAL THERAPIST

## 2025-02-17 PROCEDURE — 97110 THERAPEUTIC EXERCISES: CPT

## 2025-02-19 NOTE — PROGRESS NOTES
"Daily Note     Today's date: 2025  Patient name: Toni Mahmood  : 1935  MRN: [id number]  Referring provider: Brandon Bunn DO  Dx:   Encounter Diagnosis     ICD-10-CM    1. Gait abnormality  R26.9       2. Primary osteoarthritis of both knees  M17.0       3. H/O total knee replacement, right  Z96.651           Start Time: 1050  Stop Time: 1205  Total time in clinic (min): 75 minutes    Subjective: Patient states that he is getting stronger. He notes difficulty with straightening his right leg when he walks.       Objective: See treatment diary below      Assessment: Patient tolerated treatment and progression of treatment well.  Patient demonstrated fatigue post treatment, exhibited good technique with therapeutic exercises, and would benefit from continued PT to address ongoing impairments and improve functional mobility.       Plan: Continue per plan of care.      Precautions:  WBAT Right LE, PMH HTN, T2DM, OA  POC 3/1/25      Manuals 2/5 2/10 2/13 2/17 2/20   Right knee flex and ext  5 min  5 min 5 min  5 min  5 min   Right HS, hip ABD, quad, piriformis, and gastroc with manual hip traction  10 min  10 min 10 min with knee PA mobs  10 min with knee PA mobs and right quad STM  10 min with knee PA mobs and right quad STM                   Neuro Re-Ed        Front and Lateral Lunge         STS- No UE        Bridge with ABD  2x10   Green  2x10 Green 2x10 Blue  2x10 Blue 2x10 Blue   Rocker board- Tandem F/B and S/S  15X Each  NT 20 Each  NT  NT   U March         TB TKE   10x3\" Hold Blue  10x3\" Hold   Blue  10x3\" Hold Blue           Ther Ex        Nu-step for ROM and strength  10 min L3  10 min L3 10 min L4  10 min L4 10 min L4   Heel slides with strap    Leg ress DL Only   75#   2x10  Leg pres DL75# SL R leg only 35#2x10 ea   Quad set  HEP        Supine SLR  2x10 Right Only  2x10 Right Only 2x10 Right Only  2x10 Right Only 2x10 Right Only   LAQ  2x10   3\" Hold  2x10 3\" Hold 2x10   3\" Hold  2x10 " "  3\" Hold   3# 2x10 3\" Hold 3#   F/L step up  10x Each Bilat  4\" step  10x Each Bilat 4 10x Each   Bilat  4\" step  10x Each   Bilat   6\" Step  10x Each Bilat 6\" step   Stair HR/TR  2x10 Bilat   4\" step  2x10 Bilat 4\" step 2x10 Bilat   4\" step  2x10 Bilat   6\" step  2x10 Bilat 6\" step   HEP update/review         Ther Activity                        Gait Training                        Modalities        CP  15 min Right Knee  15 min Right Knee 15 min Right Knee  15 min Right Knee  15 min R knee                          "

## 2025-02-20 ENCOUNTER — EVALUATION (OUTPATIENT)
Dept: PHYSICAL THERAPY | Facility: CLINIC | Age: 89
End: 2025-02-20
Payer: COMMERCIAL

## 2025-02-20 DIAGNOSIS — M17.0 PRIMARY OSTEOARTHRITIS OF BOTH KNEES: ICD-10-CM

## 2025-02-20 DIAGNOSIS — Z96.651 H/O TOTAL KNEE REPLACEMENT, RIGHT: ICD-10-CM

## 2025-02-20 DIAGNOSIS — R26.9 GAIT ABNORMALITY: Primary | ICD-10-CM

## 2025-02-20 PROCEDURE — 97140 MANUAL THERAPY 1/> REGIONS: CPT

## 2025-02-20 PROCEDURE — 97110 THERAPEUTIC EXERCISES: CPT

## 2025-02-20 PROCEDURE — 97112 NEUROMUSCULAR REEDUCATION: CPT

## 2025-02-20 PROCEDURE — 97140 MANUAL THERAPY 1/> REGIONS: CPT | Performed by: PHYSICAL THERAPIST

## 2025-02-20 NOTE — PROGRESS NOTES
PT Re-Evaluation     Today's date: 2025  Patient name: Toni Mahmood  : 1935  MRN: [id number]  Referring provider: Brandon Bunn DO  Dx:   Encounter Diagnosis     ICD-10-CM    1. Gait abnormality  R26.9       2. Primary osteoarthritis of both knees  M17.0       3. H/O total knee replacement, right  Z96.651                      Assessment  Impairments: abnormal gait, abnormal or restricted ROM, activity intolerance, impaired balance, impaired physical strength, lacks appropriate home exercise program, pain with function, unable to perform ADL and activity limitations    Assessment details: PT notes the patient with continuation of decrease ROM and strength t/o the right knee and LE with demonstration of impaired gait pattern and balance s/p right TKA leading to increase pain levels and functional limitations with need for a continuation of p/o OPPT for 6 weeks with focus on gait/balance, manual therapy, strengthening, analgesic modalities, and transition to HEP.    Understanding of Dx/Px/POC: good     Prognosis: good    Goals  ST.  Initiate HEP-MET  2.  Decrease pain levels by 25-50%-MET   3.  Increase ROM by 25-50%-MET   4.  Increase strength by 1-2 mm grades-MET  LT.  Improve gait pattern and balance to good/normal level with least restrictive AD-Progressing   2.  Decrease limitations with standing, walking and stairs-Progressing   3.  Decrease limitations with transfers and ADL-Progressing   4.  Decrease limitations with squatting, lifting and carrying-Progressing   5.  DC with HEP-Progressing       Plan  Patient would benefit from: skilled physical therapy  Planned modality interventions: cryotherapy    Planned therapy interventions: manual therapy, neuromuscular re-education, patient/caregiver education, self care, strengthening, stretching, therapeutic exercise, balance, balance/weight bearing training, flexibility, gait training, graded exercise and home exercise  program    Frequency: 2x week  Duration in weeks: 6  Treatment plan discussed with: patient  Plan details: Transition to [facility].       Subjective Evaluation    History of Present Illness  Mechanism of injury: Presently the patient has attended 7 sessions of skilled therapy and feels 50-60% improvement since the start of therapy.  Patient feels the knee is moving better with increase strength but weakness in the thigh with difficulty with transfers, ambulation and balance with need or continuation of use of RW with ambulation.  Patient feels he requires additional therapy to continue to address weakness and walking deficits.    Patient Goals  Patient goals for therapy: decreased pain, improved balance, increased motion, increased strength and independence with ADLs/IADLs    Pain  Current pain ratin  At best pain ratin  At worst pain ratin  Location: Right Knee  Quality: sharp, dull ache and discomfort  Relieving factors: rest  Aggravating factors: stair climbing, walking, standing and lifting  Progression: improved    Treatments  Previous treatment: injection treatment  Current treatment: injection treatment and physical therapy  Discharged from (in last 30 days): inpatient hospitalization      Objective     Observations     Right Knee   Positive for incision.     Additional Observation Details  PT notes well healing surgical scar with no signs of infection but ecchymosis noted t/o the medial knee joint and LE     Palpation     Right   Tenderness of the vastus lateralis and vastus medialis.     Tenderness     Right Knee   Tenderness in the lateral joint line, MCL (distal), MCL (proximal), medial joint line, patellar tendon and quadriceps tendon. No tenderness in the LCL (distal) and LCL (proximal).     Neurological Testing     Reflexes   Left   Patellar (L4): trace (1+)  Achilles (S1): trace (1+)    Right   Patellar (L4): trace (1+)  Achilles (S1): trace (1+)    Active Range of Motion     Right Hip    Flexion: 52 degrees   Extension: 0 degrees   Abduction: WFL  External rotation (90/90): WFL  Internal rotation (90/90): WFL    Right Knee   Flexion: 104 degrees with pain  Extension: -8 degrees with pain    Additional Active Range of Motion Details  PT notes decrease ROM and strength t/o the right knee and LE     Passive Range of Motion     Right Hip   Flexion: 62 degrees   Extension: 3 degrees     Right Knee   Flexion: 109 degrees with pain  Extension: -5 degrees with pain    Mobility   Patellar Mobility:     Right Knee   Hypomobile: medial, lateral, superior and inferior     Strength/Myotome Testing     Right Hip   Planes of Motion   Flexion: 4-  Extension: 4+  Abduction: 4  Adduction: 5  External rotation: 4  Internal rotation: 4    Right Knee   Flexion: 4+  Extension: 4-  Quadriceps contraction: fair    Tests     Right Hip   Negative NATHAN and DIMITRI.   Chester: Positive.   Modified Chester: Positive.   90/90 SLR: Positive.   SLR: Positive.     Right Knee   Negative anterior Lachman, lateral Reina, medial Reina, posterior Lachman, valgus stress test at 0 degrees and varus stress test at 0 degrees.     Swelling     Left Knee Girth Measurement (cm)   Joint line: 40 cm    Right Knee Girth Measurement (cm)   Joint line: 42 cm    Ambulation   Weight-Bearing Status   Weight-Bearing Status (Right): weight-bearing as tolerated    Assistive device used: front-wheeled walker    Observational Gait   Gait: antalgic   Decreased walking speed, stride length and right stance time.              Precautions:  WBAT Right LE, PMH HTN, T2DM, OA  POC 3/20/25

## 2025-02-21 NOTE — PROGRESS NOTES
"Daily Note     Today's date: 2025  Patient name: Toni Mahmood  : 1935  MRN: [id number]  Referring provider: Brandon Bunn DO  Dx:   Encounter Diagnosis     ICD-10-CM    1. Gait abnormality  R26.9       2. Primary osteoarthritis of both knees  M17.0       3. H/O total knee replacement, right  Z96.651                      Subjective: Patient states he is doing better and he is walking a little better at home.      Objective: See treatment diary below      Assessment: Patient tolerated treatment well with continued steady progress towards functional strength and mobility goals. Patient exhibited good technique with therapeutic exercises and would benefit from continued skilled PT services to address ongoing impairments.        Plan: Continue per plan of care.      Precautions:  WBAT Right LE, PMH HTN, T2DM, OA  POC 3/20/25    Manuals 2/10 2/13 2/17 2/20 2/24   Right knee flex and ext  5 min 5 min  5 min  5 min 5 min    Right HS, hip ABD, quad, piriformis, and gastroc with manual hip traction  10 min 10 min with knee PA mobs  10 min with knee PA mobs and right quad STM  10 min with knee PA mobs and right quad STM 10 min with R quad STM                   Neuro Re-Ed        Front and Lateral Lunge         STS- No UE        Bridge with ABD  2x10 Green 2x10 Blue  2x10 Blue 2x10 Blue 2x10 Blue   Rocker board- Tandem F/B and S/S  NT 20 Each  NT  NT NT   U  TKE  10x3\" Hold Blue  10x3\" Hold   Blue  10x3\" Hold Blue 10x3\" Hold Blue           Ther Ex        Nu-step for ROM and strength  10 min L3 10 min L4  10 min L4 10 min L4 10 min L4   Heel slides with strap   Leg ress DL Only   75#   2x10  Leg pres DL75# SL R leg only 35#2x10 ea Leg press DL 75# SL R leg only 35# 2x10 ea   Quad set         Supine SLR  2x10 Right Only 2x10 Right Only  2x10 Right Only 2x10 Right Only 2x10 Right Only   LAQ  2x10 3\" Hold 2x10   3\" Hold  2x10   3\" Hold   3# 2x10 3\" Hold 3# 2x10 3\" Hold 3#   F/L step up  10x " "Each Bilat 4 10x Each   Bilat  4\" step  10x Each   Bilat   6\" Step  10x Each Bilat 6\" step 2x10 Each Bilat 6\" step   Stair HR/TR  2x10 Bilat 4\" step 2x10 Bilat   4\" step  2x10 Bilat   6\" step  2x10 Bilat 6\" step 2x10 Bilat 6\" step   HEP update/review         Ther Activity                        Gait Training                        Modalities        CP  15 min Right Knee 15 min Right Knee  15 min Right Knee  15 min R knee 15 min R knee                    "

## 2025-02-24 ENCOUNTER — OFFICE VISIT (OUTPATIENT)
Dept: PHYSICAL THERAPY | Facility: CLINIC | Age: 89
End: 2025-02-24
Payer: COMMERCIAL

## 2025-02-24 DIAGNOSIS — Z96.651 H/O TOTAL KNEE REPLACEMENT, RIGHT: ICD-10-CM

## 2025-02-24 DIAGNOSIS — M17.0 PRIMARY OSTEOARTHRITIS OF BOTH KNEES: ICD-10-CM

## 2025-02-24 DIAGNOSIS — R26.9 GAIT ABNORMALITY: Primary | ICD-10-CM

## 2025-02-24 PROCEDURE — 97110 THERAPEUTIC EXERCISES: CPT

## 2025-02-24 PROCEDURE — 97112 NEUROMUSCULAR REEDUCATION: CPT

## 2025-02-24 PROCEDURE — 97140 MANUAL THERAPY 1/> REGIONS: CPT

## 2025-02-26 NOTE — PROGRESS NOTES
"Daily Note     Today's date: 2025  Patient name: Toni Mahmood  : 1935  MRN: [id number]  Referring provider: Brandon Bunn DO  Dx:   Encounter Diagnosis     ICD-10-CM    1. Gait abnormality  R26.9       2. Primary osteoarthritis of both knees  M17.0       3. H/O total knee replacement, right  Z96.651                      Subjective: Patient states \"My knee is sore today but I believe it is due to the rainy weather\"       Objective: See treatment diary below      Assessment: Patient tolerated treatment well. Patient demonstrated fatigue post treatment, exhibited good technique with therapeutic exercises, and would benefit from continued PT to address ongoing impairments and improve functional mobility.       Plan: Continue per plan of care.      Precautions:  WBAT Right LE, PMH HTN, T2DM, OA  POC 3/20/25    Manuals    Right knee flex and ext  5 min  5 min  5 min 5 min  5 min   Right HS, hip ABD, quad, piriformis, and gastroc with manual hip traction  10 min with knee PA mobs  10 min with knee PA mobs and right quad STM  10 min with knee PA mobs and right quad STM 10 min with R quad STM 10 min with R quad STM                   Neuro Re-Ed        Front and Lateral Lunge         STS- No UE        Bridge with ABD  2x10 Blue  2x10 Blue 2x10 Blue 2x10 Blue 2x10 Blue   Rocker board- Tandem F/B and S/S  20 Each  NT  NT NT NT   BOSU March         TB TKE 10x3\" Hold Blue  10x3\" Hold   Blue  10x3\" Hold Blue 10x3\" Hold Blue 10x3\" Hold           Ther Ex        Nu-step for ROM and strength  10 min L4  10 min L4 10 min L4 10 min L4 10 min L4   Heel slides with strap  Leg ress DL Only   75#   2x10  Leg pres DL75# SL R leg only 35#2x10 ea Leg press DL 75# SL R leg only 35# 2x10 ea Leg press DL 75# SL R leg only 35# 2x10 ea   Quad set         Supine SLR  2x10 Right Only  2x10 Right Only 2x10 Right Only 2x10 Right Only 2x10 Right Only   LAQ  2x10   3\" Hold  2x10   3\" Hold   3# 2x10 3\" Hold 3# " "2x10 3\" Hold 3# 2x10 3\" Hold 3#   F/L step up  10x Each   Bilat  4\" step  10x Each   Bilat   6\" Step  10x Each Bilat 6\" step 2x10 Each Bilat 6\" step 2x10 Each Bilat 6\" step   Stair HR/TR  2x10 Bilat   4\" step  2x10 Bilat   6\" step  2x10 Bilat 6\" step 2x10 Bilat 6\" step 2x10 Bilat 6\" step   HEP update/review         Ther Activity                        Gait Training                        Modalities        CP  15 min Right Knee  15 min Right Knee  15 min R knee 15 min R knee 15 min R knee                      "

## 2025-02-27 ENCOUNTER — OFFICE VISIT (OUTPATIENT)
Dept: PHYSICAL THERAPY | Facility: CLINIC | Age: 89
End: 2025-02-27
Payer: COMMERCIAL

## 2025-02-27 DIAGNOSIS — M17.0 PRIMARY OSTEOARTHRITIS OF BOTH KNEES: ICD-10-CM

## 2025-02-27 DIAGNOSIS — R26.9 GAIT ABNORMALITY: Primary | ICD-10-CM

## 2025-02-27 DIAGNOSIS — Z96.651 H/O TOTAL KNEE REPLACEMENT, RIGHT: ICD-10-CM

## 2025-02-27 PROCEDURE — 97110 THERAPEUTIC EXERCISES: CPT

## 2025-02-27 PROCEDURE — 97112 NEUROMUSCULAR REEDUCATION: CPT

## 2025-02-27 PROCEDURE — 97140 MANUAL THERAPY 1/> REGIONS: CPT

## 2025-03-03 ENCOUNTER — OFFICE VISIT (OUTPATIENT)
Dept: PHYSICAL THERAPY | Facility: CLINIC | Age: 89
End: 2025-03-03
Payer: COMMERCIAL

## 2025-03-03 DIAGNOSIS — R26.9 GAIT ABNORMALITY: Primary | ICD-10-CM

## 2025-03-03 DIAGNOSIS — Z96.651 H/O TOTAL KNEE REPLACEMENT, RIGHT: ICD-10-CM

## 2025-03-03 DIAGNOSIS — M17.0 PRIMARY OSTEOARTHRITIS OF BOTH KNEES: ICD-10-CM

## 2025-03-03 PROCEDURE — 97116 GAIT TRAINING THERAPY: CPT

## 2025-03-03 PROCEDURE — 97140 MANUAL THERAPY 1/> REGIONS: CPT

## 2025-03-03 PROCEDURE — 97112 NEUROMUSCULAR REEDUCATION: CPT

## 2025-03-03 PROCEDURE — 97110 THERAPEUTIC EXERCISES: CPT

## 2025-03-03 NOTE — PROGRESS NOTES
"Daily Note     Today's date: 3/3/2025  Patient name: Toni Mahmood  : 1935  MRN: [id number]  Referring provider: Brandon Bunn DO  Dx: No diagnosis found.               Subjective: Patient states that he feels like he is improving but he still needs to to get stronger.       Objective: See treatment diary below      Assessment: Patient tolerated treatment well today. PTA instructed patient in advancement of AD to SPC; he demonstrates decreased strength and stability while ambulating but no LOB. Discussed continued use of RW for safety while at home or in community as he does have deficits in ms strength and endurance. Overall progressing towards goals. Patient would benefit from continued OP PT services in order to address ongoing impairments and improve functional activity limitations.       Plan: Continue per plan of care.      Precautions:  WBAT Right LE, PMH HTN, T2DM, OA  POC 3/20/25    Manuals 2/17 2/20 2/24 2/27 3/3   Right knee flex and ext  5 min  5 min 5 min  5 min 5 min   Right HS, hip ABD, quad, piriformis, and gastroc with manual hip traction  10 min with knee PA mobs and right quad STM  10 min with knee PA mobs and right quad STM 10 min with R quad STM 10 min with R quad STM 10 min                   Neuro Re-Ed        Front and Lateral Lunge         STS- No UE        Bridge with ABD  2x10 Blue 2x10 Blue 2x10 Blue 2x10 Blue 2x10 Blue   Rocker board- Tandem F/B and S/S  NT  NT NT NT    BOSU March         TB TKE 10x3\" Hold   Blue  10x3\" Hold Blue 10x3\" Hold Blue 10x3\" Hold 10x3\" Hold           Ther Ex        Nu-step for ROM and strength  10 min L4 10 min L4 10 min L4 10 min L4 10 min L4   Heel slides with strap Leg ress DL Only   75#   2x10  Leg pres DL75# SL R leg only 35#2x10 ea Leg press DL 75# SL R leg only 35# 2x10 ea Leg press DL 75# SL R leg only 35# 2x10 ea Leg Press DL 75# SL R leg only 35# 2x10 ea   Quad set         Supine SLR  2x10 Right Only 2x10 Right Only 2x10 Right Only 2x10 " "Right Only 2x10 Right Only   LAQ  2x10   3\" Hold   3# 2x10 3\" Hold 3# 2x10 3\" Hold 3# 2x10 3\" Hold 3# 2x10 3\" Hold 3#   F/L step up  10x Each   Bilat   6\" Step  10x Each Bilat 6\" step 2x10 Each Bilat 6\" step 2x10 Each Bilat 6\" step 2x10 Each Bilat 6\" step   Stair HR/TR  2x10 Bilat   6\" step  2x10 Bilat 6\" step 2x10 Bilat 6\" step 2x10 Bilat 6\" step 2x10 Bilat 6\" step   HEP update/review         Ther Activity                        Gait Training     10 min                   Modalities        CP  15 min Right Knee  15 min R knee 15 min R knee 15 min R knee 15 min R knee                        "

## 2025-03-05 NOTE — PROGRESS NOTES
"Daily Note     Today's date: 3/6/2025  Patient name: Toni Mahmood  : 1935  MRN: [id number]  Referring provider: Brandon Bunn DO  Dx:   Encounter Diagnosis     ICD-10-CM    1. Gait abnormality  R26.9       2. Primary osteoarthritis of both knees  M17.0       3. H/O total knee replacement, right  Z96.651                      Subjective: Patient states he is making improvements but he would still like to work on his walking. He notes his leg gets stiff when he first wakes up in the morning.       Objective: See treatment diary below      Assessment: Patient tolerated treatment and progression of treatment well. PTA continues to work on ambulating with SPC. Patient demonstrates fairly steady gait but ms fatigue causes increased unsteadiness and decreased gregoria. Patient recommended to continue using walker while at home for safety. Overall progressing towards goals. Patient would benefit from continued OP PT services in order to address ongoing impairments and improve functional activity limitations.       Plan: Continue per plan of care.      Precautions:  WBAT Right LE, PMH HTN, T2DM, OA  POC 3/20/25    Manuals 2/20 2/24 2/27 3/3 3/6   Right knee flex and ext  5 min 5 min  5 min 5 min 5 min   Right HS, hip ABD, quad, piriformis, and gastroc with manual hip traction  10 min with knee PA mobs and right quad STM 10 min with R quad STM 10 min with R quad STM 10 min 10 min                   Neuro Re-Ed        Front and Lateral Lunge         STS- No UE        Bridge with ABD  2x10 Blue 2x10 Blue 2x10 Blue 2x10 Blue 2x10 Blue   Rocker board- Tandem F/B and S/S  NT NT NT     BOSU March         TB TKE 10x3\" Hold Blue 10x3\" Hold Blue 10x3\" Hold 10x3\" Hold 10x3\" Hold           Ther Ex        Nu-step for ROM and strength  10 min L4 10 min L4 10 min L4 10 min L4 10 min L5   Heel slides with strap Leg pres DL75# SL R leg only 35#2x10 ea Leg press DL 75# SL R leg only 35# 2x10 ea Leg press DL 75# SL R leg only 35# " "2x10 ea Leg Press DL 75# SL R leg only 35# 2x10 ea Leg Press DL 75# SL R leg only 35# 2x10 ea   Quad set         Supine SLR  2x10 Right Only 2x10 Right Only 2x10 Right Only 2x10 Right Only 2x10 Right Only   LAQ  2x10 3\" Hold 3# 2x10 3\" Hold 3# 2x10 3\" Hold 3# 2x10 3\" Hold 3# 2x10 3\" Hold 3#   F/L step up  10x Each Bilat 6\" step 2x10 Each Bilat 6\" step 2x10 Each Bilat 6\" step 2x10 Each Bilat 6\" step 2x10 Each Bilat 6\" step   Stair HR/TR  2x10 Bilat 6\" step 2x10 Bilat 6\" step 2x10 Bilat 6\" step 2x10 Bilat 6\" step 2x10 Bliat 6\" step   HEP update/review         Ther Activity                        Gait Training    10 min 10 min                   Modalities        CP  15 min R knee 15 min R knee 15 min R knee 15 min R knee 15 min R knee                          "

## 2025-03-06 ENCOUNTER — OFFICE VISIT (OUTPATIENT)
Dept: PHYSICAL THERAPY | Facility: CLINIC | Age: 89
End: 2025-03-06
Payer: COMMERCIAL

## 2025-03-06 DIAGNOSIS — R26.9 GAIT ABNORMALITY: Primary | ICD-10-CM

## 2025-03-06 DIAGNOSIS — M17.0 PRIMARY OSTEOARTHRITIS OF BOTH KNEES: ICD-10-CM

## 2025-03-06 DIAGNOSIS — Z96.651 H/O TOTAL KNEE REPLACEMENT, RIGHT: ICD-10-CM

## 2025-03-06 PROCEDURE — 97116 GAIT TRAINING THERAPY: CPT

## 2025-03-06 PROCEDURE — 97140 MANUAL THERAPY 1/> REGIONS: CPT

## 2025-03-06 PROCEDURE — 97112 NEUROMUSCULAR REEDUCATION: CPT

## 2025-03-07 NOTE — PROGRESS NOTES
"Daily Note     Today's date: 3/10/2025  Patient name: Toni Mahmood  : 1935  MRN: [id number]  Referring provider: Brandon Bunn DO  Dx:   Encounter Diagnosis     ICD-10-CM    1. Gait abnormality  R26.9       2. Primary osteoarthritis of both knees  M17.0       3. H/O total knee replacement, right  Z96.651                      Subjective: Patient reports that he is still having trouble with the strength in his leg.       Objective: See treatment diary below      Assessment: Patient able to tolerate treatment well today. Patient demonstrates consistent progress with strength and mobility, however, lack of knee extension still persist. Treatment continues to focus on building flexibility and strength to improve knee mobility.  Patient to benefit from continued skilled PT services to address ongoing impairments.        Plan: Continue per plan of care.      Precautions:  WBAT Right LE, PMH HTN, T2DM, OA  POC 3/20/25    Manuals 2/24 2/27 3/3 3/6 3/10   Right knee flex and ext  5 min  5 min 5 min 5 min 5 min   Right HS, hip ABD, quad, piriformis, and gastroc with manual hip traction  10 min with R quad STM 10 min with R quad STM 10 min 10 min 5 min                   Neuro Re-Ed        Front and Lateral Lunge         STS- No UE        Bridge with ABD  2x10 Blue 2x10 Blue 2x10 Blue 2x10 Blue 2x10 Blue   Rocker board- Tandem F/B and S/S  NT NT      BOSU March         TB TKE 10x3\" Hold Blue 10x3\" Hold 10x3\" Hold 10x3\" Hold 10x3\" Hold           Ther Ex        Nu-step for ROM and strength  10 min L4 10 min L4 10 min L4 10 min L5 10 min L5   Heel slides with strap Leg press DL 75# SL R leg only 35# 2x10 ea Leg press DL 75# SL R leg only 35# 2x10 ea Leg Press DL 75# SL R leg only 35# 2x10 ea Leg Press DL 75# SL R leg only 35# 2x10 ea Leg Press DL 75# SL R leg only 35# 2x10 ea   Quad set         Supine SLR  2x10 Right Only 2x10 Right Only 2x10 Right Only 2x10 Right Only 2x10 Right Only   LAQ  2x10 3\" Hold 3# 2x10 3\" " "Hold 3# 2x10 3\" Hold 3# 2x10 3\" Hold 3# 2x10 3\" Hold 3#   F/L step up  2x10 Each Bilat 6\" step 2x10 Each Bilat 6\" step 2x10 Each Bilat 6\" step 2x10 Each Bilat 6\" step 2x10 Each Bilat 6\" step   Stair HR/TR  2x10 Bilat 6\" step 2x10 Bilat 6\" step 2x10 Bilat 6\" step 2x10 Bliat 6\" step 2x10 Bilat 6\" step   HEP update/review         Ther Activity                        Gait Training   10 min 10 min 10 min                   Modalities        CP  15 min R knee 15 min R knee 15 min R knee 15 min R knee 15 min R knee                            "

## 2025-03-10 ENCOUNTER — OFFICE VISIT (OUTPATIENT)
Dept: PHYSICAL THERAPY | Facility: CLINIC | Age: 89
End: 2025-03-10
Payer: COMMERCIAL

## 2025-03-10 DIAGNOSIS — Z96.651 H/O TOTAL KNEE REPLACEMENT, RIGHT: ICD-10-CM

## 2025-03-10 DIAGNOSIS — M17.0 PRIMARY OSTEOARTHRITIS OF BOTH KNEES: ICD-10-CM

## 2025-03-10 DIAGNOSIS — R26.9 GAIT ABNORMALITY: Primary | ICD-10-CM

## 2025-03-10 PROCEDURE — 97140 MANUAL THERAPY 1/> REGIONS: CPT

## 2025-03-10 PROCEDURE — 97116 GAIT TRAINING THERAPY: CPT

## 2025-03-10 PROCEDURE — 97112 NEUROMUSCULAR REEDUCATION: CPT

## 2025-03-13 ENCOUNTER — OFFICE VISIT (OUTPATIENT)
Dept: PHYSICAL THERAPY | Facility: CLINIC | Age: 89
End: 2025-03-13
Payer: COMMERCIAL

## 2025-03-13 DIAGNOSIS — R26.9 GAIT ABNORMALITY: Primary | ICD-10-CM

## 2025-03-13 DIAGNOSIS — Z96.651 H/O TOTAL KNEE REPLACEMENT, RIGHT: ICD-10-CM

## 2025-03-13 DIAGNOSIS — M17.0 PRIMARY OSTEOARTHRITIS OF BOTH KNEES: ICD-10-CM

## 2025-03-13 PROCEDURE — 97140 MANUAL THERAPY 1/> REGIONS: CPT

## 2025-03-13 PROCEDURE — 97110 THERAPEUTIC EXERCISES: CPT

## 2025-03-13 PROCEDURE — 97112 NEUROMUSCULAR REEDUCATION: CPT

## 2025-03-13 NOTE — PROGRESS NOTES
"Daily Note     Today's date: 3/13/2025  Patient name: Toni Mahmood  : 1935  MRN: [id number]  Referring provider: Brandon Bunn DO  Dx:   Encounter Diagnosis     ICD-10-CM    1. Gait abnormality  R26.9       2. Primary osteoarthritis of both knees  M17.0       3. H/O total knee replacement, right  Z96.651                      Subjective: Patient notes continued improvements with his strength.       Objective: See treatment diary below      Assessment: Patient tolerated treatment well with continued steady progress towards functional strength and mobility goals. Patient exhibited good technique with therapeutic exercises and would benefit from continued skilled PT services to address ongoing impairments.        Plan: Continue per plan of care.      Precautions:  WBAT Right LE, PMH HTN, T2DM, OA  POC 3/20/25    Manuals 2/27 3/3 3/6 3/10 3/13   Right knee flex and ext  5 min 5 min 5 min 5 min 5 min   Right HS, hip ABD, quad, piriformis, and gastroc with manual hip traction  10 min with R quad STM 10 min 10 min 5 min 5 min                   Neuro Re-Ed        Front and Lateral Lunge         STS- No UE        Bridge with ABD  2x10 Blue 2x10 Blue 2x10 Blue 2x10 Blue 2x10 Blue   Rocker board- Tandem F/B and S/S  NT    Side stepping // bars 8 laps    TKE 10x3\" Hold 10x3\" Hold 10x3\" Hold 10x3\" Hold 10x3\" Hold           Ther Ex        Nu-step for ROM and strength  10 min L4 10 min L4 10 min L5 10 min L5 10 min L5   Heel slides with strap Leg press DL 75# SL R leg only 35# 2x10 ea Leg Press DL 75# SL R leg only 35# 2x10 ea Leg Press DL 75# SL R leg only 35# 2x10 ea Leg Press DL 75# SL R leg only 35# 2x10 ea Leg Press DL 75# SL R leg only 35# 2x10 ea   Quad set         Supine SLR  2x10 Right Only 2x10 Right Only 2x10 Right Only 2x10 Right Only 2x10 Right Only   LAQ  2x10 3\" Hold 3# 2x10 3\" Hold 3# 2x10 3\" Hold 3# 2x10 3\" Hold 3# 2x10 3\" Hold 3#   F/L step up  2x10 Each Bilat 6\" step 2x10 Each " "Bilat 6\" step 2x10 Each Bilat 6\" step 2x10 Each Bilat 6\" step 2x10 Each 6\" step   Stair HR/TR  2x10 Bilat 6\" step 2x10 Bilat 6\" step 2x10 Bliat 6\" step 2x10 Bilat 6\" step 2x10 Bliat 6\" step   HEP update/review         Ther Activity                        Gait Training  10 min 10 min 10 min                    Modalities        CP  15 min R knee 15 min R knee 15 min R knee 15 min R knee 15 min R knee                              "

## 2025-03-16 NOTE — PROGRESS NOTES
"Daily Note     Today's date: 3/17/2025  Patient name: Toni Mahmood  : 1935  MRN: [id number]  Referring provider: Brandon Bunn DO  Dx:   Encounter Diagnosis     ICD-10-CM    1. Gait abnormality  R26.9       2. Primary osteoarthritis of both knees  M17.0       3. H/O total knee replacement, right  Z96.651                      Subjective:  Patient reports the knee is feeling stiff this morning but overall feels the LE is getting stronger since the start of therapy.       Objective: See treatment diary below      Assessment: Tolerated treatment well.  PT notes continuation of decrease ROM and strength t/o the right knee and LE with demonstration of impaired gait pattern and balance s/p right TKA with need for continuation of skilled therapy.  PT notes focus on TKE with standing and walking to decrease knee flexion during the activity to assist with decrease strain to the right quad.        Plan: Progress note during next visit.      Precautions:  WBAT Right LE, PMH HTN, T2DM, OA  POC 3/20/25    Manuals 3/3 3/6 3/10 3/13 3/17   Right knee flex and ext  5 min 5 min 5 min 5 min 5 min   Right HS, hip ABD, quad, piriformis, and gastroc with manual hip traction  10 min 10 min 5 min 5 min 5 min                    Neuro Re-Ed        Front and Lateral Lunge         STS- No UE     10x No Weight   10x 6# WB    Bridge with ABD  2x10 Blue 2x10 Blue 2x10 Blue 2x10 Blue With Tball   10x5\" Hold    Rocker board- Tandem F/B and S/S     Side stepping // bars 8 laps NT    BOSU March      2 min    TB TKE 10x3\" Hold 10x3\" Hold 10x3\" Hold 10x3\" Hold DC HEP            Ther Ex        Nu-step for ROM and strength  10 min L4 10 min L5 10 min L5 10 min L5 10 min L6    Leg Press  Leg Press DL 75# SL R leg only 35# 2x10 ea Leg Press DL 75# SL R leg only 35# 2x10 ea Leg Press DL 75# SL R leg only 35# 2x10 ea Leg Press DL 75# SL R leg only 35# 2x10 ea 85# DL  45# SL  2x10 Bilat    Quad set         Supine SLR  2x10 Right Only 2x10 Right " "Only 2x10 Right Only 2x10 Right Only DC HEP    LAQ  2x10 3\" Hold 3# 2x10 3\" Hold 3# 2x10 3\" Hold 3# 2x10 3\" Hold 3# DC  HEP    F/L step up  2x10 Each Bilat 6\" step 2x10 Each Bilat 6\" step 2x10 Each Bilat 6\" step 2x10 Each 6\" step 10x Each Bilat   8\" step    Stair HR/TR  2x10 Bilat 6\" step 2x10 Bliat 6\" step 2x10 Bilat 6\" step 2x10 Bliat 6\" step 2x10 Bilat   4\" Step    HEP update/review         Ther Activity                        Gait Training 10 min 10 min 10 min                     Modalities        CP  15 min R knee 15 min R knee 15 min R knee 15 min R knee 15 min Right Knee                                 "

## 2025-03-17 ENCOUNTER — OFFICE VISIT (OUTPATIENT)
Dept: PHYSICAL THERAPY | Facility: CLINIC | Age: 89
End: 2025-03-17
Payer: COMMERCIAL

## 2025-03-17 DIAGNOSIS — Z96.651 H/O TOTAL KNEE REPLACEMENT, RIGHT: ICD-10-CM

## 2025-03-17 DIAGNOSIS — M17.0 PRIMARY OSTEOARTHRITIS OF BOTH KNEES: ICD-10-CM

## 2025-03-17 DIAGNOSIS — R26.9 GAIT ABNORMALITY: Primary | ICD-10-CM

## 2025-03-17 PROCEDURE — 97110 THERAPEUTIC EXERCISES: CPT | Performed by: PHYSICAL THERAPIST

## 2025-03-17 PROCEDURE — 97140 MANUAL THERAPY 1/> REGIONS: CPT | Performed by: PHYSICAL THERAPIST

## 2025-03-17 PROCEDURE — 97112 NEUROMUSCULAR REEDUCATION: CPT | Performed by: PHYSICAL THERAPIST

## 2025-03-19 NOTE — PROGRESS NOTES
PT Re-Evaluation     Today's date: 3/20/2025  Patient name: Toni Mahmood  : 1935  MRN: [id number]  Referring provider: Brandon Bunn DO  Dx:   Encounter Diagnosis     ICD-10-CM    1. Gait abnormality  R26.9       2. Primary osteoarthritis of both knees  M17.0       3. H/O total knee replacement, right  Z96.651                      Assessment  Impairments: abnormal gait, abnormal or restricted ROM, activity intolerance, impaired balance, impaired physical strength, lacks appropriate home exercise program, pain with function, unable to perform ADL and activity limitations    Assessment details: PT notes the patient with continuation of decrease ROM and strength t/o the right knee and LE with demonstration of impaired gait pattern and balance s/p right TKA leading to increase pain levels and functional limitations with need for a continuation of p/o OPPT for 4 weeks with focus on gait/balance, manual therapy, strengthening, analgesic modalities, and transition to HEP.  PT notes the limitations with TKE which PT will continue to address with POC and HEP to meet therapy goals.    Understanding of Dx/Px/POC: good     Prognosis: good    Goals  ST.  Initiate HEP-MET  2.  Decrease pain levels by 25-50%-MET   3.  Increase ROM by 25-50%-MET   4.  Increase strength by 1-2 mm grades-MET  LT.  Improve gait pattern and balance to good/normal level with least restrictive AD-Progressing   2.  Decrease limitations with standing, walking and stairs-Progressing   3.  Decrease limitations with transfers and ADL-Progressing   4.  Decrease limitations with squatting, lifting and carrying-Progressing   5.  DC with HEP-Progressing       Plan  Patient would benefit from: skilled physical therapy  Planned modality interventions: cryotherapy    Planned therapy interventions: manual therapy, neuromuscular re-education, patient/caregiver education, self care, strengthening, stretching, therapeutic exercise, balance,  balance/weight bearing training, flexibility, gait training, graded exercise and home exercise program    Frequency: 2x week  Duration in weeks: 4  Treatment plan discussed with: patient  Plan details: Transition to [facility].         Subjective Evaluation    History of Present Illness  Mechanism of injury: Presently the patient has attended multiple sessions of skilled therapy and feels 70-80% improvement since the start of therapy.  Patient feels the knee is moving better with increase strength but weakness in the thigh with difficulty with transfers, ambulation and balance with need or continuation of use of RW with ambulation.  Patient feels he requires additional therapy to continue to address weakness and walking deficits.    Patient Goals  Patient goals for therapy: decreased pain, improved balance, increased motion, increased strength and independence with ADLs/IADLs    Pain  Current pain ratin  At best pain ratin  At worst pain rating: 3  Location: Right Knee  Quality: sharp, dull ache and discomfort  Relieving factors: rest  Aggravating factors: stair climbing, walking, standing and lifting  Progression: improved    Treatments  Previous treatment: injection treatment  Current treatment: injection treatment and physical therapy  Discharged from (in last 30 days): inpatient hospitalization        Objective     Observations     Right Knee   Positive for incision.     Additional Observation Details  PT notes well healed surgical scar with no signs of infection but ecchymosis noted t/o the medial knee joint and LE     Palpation     Right   Tenderness of the vastus lateralis and vastus medialis.     Tenderness     Right Knee   Tenderness in the lateral joint line, MCL (distal), MCL (proximal), medial joint line, patellar tendon and quadriceps tendon. No tenderness in the LCL (distal) and LCL (proximal).     Neurological Testing     Reflexes   Left   Patellar (L4): trace (1+)  Achilles (S1): trace (1+)    Right    Patellar (L4): trace (1+)  Achilles (S1): trace (1+)    Active Range of Motion     Right Hip   Flexion: 57 degrees   Extension: 2 degrees   Abduction: WFL  External rotation (90/90): WFL  Internal rotation (90/90): WFL    Right Knee   Flexion: 106 degrees with pain  Extension: -11 degrees with pain    Additional Active Range of Motion Details  PT notes decrease ROM and strength t/o the right knee and LE     Passive Range of Motion     Right Hip   Flexion: 64 degrees   Extension: 5 degrees     Right Knee   Flexion: 112 degrees with pain  Extension: -8 degrees with pain    Mobility   Patellar Mobility:     Right Knee   Hypomobile: medial, lateral, superior and inferior     Strength/Myotome Testing     Right Hip   Planes of Motion   Flexion: 4  Extension: 5  Abduction: 4+  Adduction: 5  External rotation: 4  Internal rotation: 4+    Right Knee   Flexion: 5  Extension: 4  Quadriceps contraction: fair    Tests     Right Hip   Negative NATHAN and DIMITRI.   Chester: Positive.   Modified Chester: Positive.   90/90 SLR: Positive.   SLR: Positive.     Right Knee   Negative anterior Lachman, lateral Reina, medial Reina, posterior Lachman, valgus stress test at 0 degrees and varus stress test at 0 degrees.     Swelling     Left Knee Girth Measurement (cm)   Joint line: 40 cm    Right Knee Girth Measurement (cm)   Joint line: 42 cm    Ambulation   Weight-Bearing Status   Weight-Bearing Status (Right): weight-bearing as tolerated    Assistive device used: front-wheeled walker and single point cane    Observational Gait   Gait: antalgic   Decreased walking speed, stride length and right stance time.              Precautions:  WBAT Right LE, PMH HTN, T2DM, OA  POC 4/20/25    Manuals 3/6 3/10 3/13 3/17 3/20   Right knee flex and ext  5 min 5 min 5 min 5 min 5 min    Right HS, hip ABD, quad, piriformis, and gastroc with manual hip traction  10 min 5 min 5 min 5 min  10 min Focus on TKE                    Neuro Re-Ed        Front  "and Lateral Lunge         STS- No UE    10x No Weight   10x 6# WB  2x10   6# WB    Bridge with ABD  2x10 Blue 2x10 Blue 2x10 Blue With Tball   10x5\" Hold  With Tball   2x10   5\" Hold    [employer] board- Tandem F/B and S/S    Side stepping // bars 8 laps NT  NT   BOSU March     2 min  3 min    TB TKE 10x3\" Hold 10x3\" Hold 10x3\" Hold DC HEP             Ther Ex        Nu-step for ROM and strength  10 min L5 10 min L5 10 min L5 10 min L6  10 min L6    Leg Press  Leg Press DL 75# SL R leg only 35# 2x10 ea Leg Press DL 75# SL R leg only 35# 2x10 ea Leg Press DL 75# SL R leg only 35# 2x10 ea 85# DL  45# SL  2x10 Bilat  95# DL  55# SL   2x10 Bilat    Quad set         Supine SLR  2x10 Right Only 2x10 Right Only 2x10 Right Only DC HEP     LAQ  2x10 3\" Hold 3# 2x10 3\" Hold 3# 2x10 3\" Hold 3# DC  HEP     F/L step up  2x10 Each Bilat 6\" step 2x10 Each Bilat 6\" step 2x10 Each 6\" step 10x Each Bilat   8\" step  10x Each   Bilat   8\" step    Stair HR/TR  2x10 Bliat 6\" step 2x10 Bilat 6\" step 2x10 Bliat 6\" step 2x10 Bilat   4\" Step  NT    HEP update/review      10 min    Ther Activity                        Gait Training 10 min 10 min                      Modalities        CP  15 min R knee 15 min R knee 15 min R knee 15 min Right Knee  15 min Right Knee                       "

## 2025-03-20 ENCOUNTER — EVALUATION (OUTPATIENT)
Dept: PHYSICAL THERAPY | Facility: CLINIC | Age: 89
End: 2025-03-20
Payer: COMMERCIAL

## 2025-03-20 DIAGNOSIS — R26.9 GAIT ABNORMALITY: Primary | ICD-10-CM

## 2025-03-20 DIAGNOSIS — Z96.651 H/O TOTAL KNEE REPLACEMENT, RIGHT: ICD-10-CM

## 2025-03-20 DIAGNOSIS — M17.0 PRIMARY OSTEOARTHRITIS OF BOTH KNEES: ICD-10-CM

## 2025-03-20 PROCEDURE — 97110 THERAPEUTIC EXERCISES: CPT | Performed by: PHYSICAL THERAPIST

## 2025-03-20 PROCEDURE — 97112 NEUROMUSCULAR REEDUCATION: CPT | Performed by: PHYSICAL THERAPIST

## 2025-03-20 PROCEDURE — 97140 MANUAL THERAPY 1/> REGIONS: CPT | Performed by: PHYSICAL THERAPIST

## 2025-03-20 PROCEDURE — 97535 SELF CARE MNGMENT TRAINING: CPT | Performed by: PHYSICAL THERAPIST

## 2025-03-21 NOTE — PROGRESS NOTES
"Daily Note     Today's date: 3/24/2025  Patient name: Toni Mahmood  : 1935  MRN: [id number]  Referring provider: Brandon Bunn DO  Dx:   Encounter Diagnosis     ICD-10-CM    1. Gait abnormality  R26.9       2. Primary osteoarthritis of both knees  M17.0       3. H/O total knee replacement, right  Z96.651                      Subjective:  Patient reports he continues to work on the knee straightening out with HEP.  Patient reports a f/u with MD prior to session who states the patient needs to continue with skilled therapy to continue to address ROM and strength deficits.       Objective: See treatment diary below      Assessment: Tolerated treatment well.  PT notes continuation of decrease ROM and strength t/o the right knee and LE with demonstration of impaired gait pattern and balance s/p right TKA with need for continuation of skilled therapy.  PT notes focus on TKE with standing and walking to decrease knee flexion during the activity to assist with decrease strain to the right quad.        Plan: Continue per plan of care.      Precautions:  WBAT Right LE, PMH HTN, T2DM, OA  POC 25    Manuals 3/10 3/13 3/17 3/20 3/24   Right knee flex and ext  5 min 5 min 5 min 5 min  5 min    Right HS, hip ABD, quad, piriformis, and gastroc with manual hip traction  5 min 5 min 5 min  10 min Focus on TKE  10 min Focus on TKE                    Neuro Re-Ed        Front and Lateral Lunge      10x Each Bilat    STS- No UE   10x No Weight   10x 6# WB  2x10   6# WB  2x10   6# WB    Bridge with ABD  2x10 Blue 2x10 Blue With Tball   10x5\" Hold  With Tball   2x10   5\" Hold  With Tball   2x10   5\" Hold    TeachScape board- Tandem F/B and S/S   Side stepping // bars 8 laps NT  NT    BOSU March    2 min  3 min  3 min    TB TKE 10x3\" Hold 10x3\" Hold DC HEP              Ther Ex        Nu-step for ROM and strength  10 min L5 10 min L5 10 min L6  10 min L6  SRC   10 min L3   Leg Press  Leg Press DL 75# SL R leg only 35# 2x10 ea " "Leg Press DL 75# SL R leg only 35# 2x10 ea 85# DL  45# SL  2x10 Bilat  95# DL  55# SL   2x10 Bilat  105# DL  55# SL  2x10 Each Bilat    Quad set         Supine SLR  2x10 Right Only 2x10 Right Only DC HEP      LAQ  2x10 3\" Hold 3# 2x10 3\" Hold 3# DC  HEP      F/L step up  2x10 Each Bilat 6\" step 2x10 Each 6\" step 10x Each Bilat   8\" step  10x Each   Bilat   8\" step  NT    Stair HR/TR  2x10 Bilat 6\" step 2x10 Bliat 6\" step 2x10 Bilat   4\" Step  NT  2x10 Bilat   4\" step    HEP update/review     10 min     Ther Activity                        Gait Training 10 min                       Modalities        CP  15 min R knee 15 min R knee 15 min Right Knee  15 min Right Knee 15 min Right Knee                             "

## 2025-03-24 ENCOUNTER — OFFICE VISIT (OUTPATIENT)
Dept: OBGYN CLINIC | Facility: CLINIC | Age: 89
End: 2025-03-24

## 2025-03-24 ENCOUNTER — OFFICE VISIT (OUTPATIENT)
Dept: PHYSICAL THERAPY | Facility: CLINIC | Age: 89
End: 2025-03-24
Payer: COMMERCIAL

## 2025-03-24 VITALS — BODY MASS INDEX: 31.31 KG/M2 | HEIGHT: 66 IN | WEIGHT: 194.8 LBS

## 2025-03-24 DIAGNOSIS — Z96.651 H/O TOTAL KNEE REPLACEMENT, RIGHT: ICD-10-CM

## 2025-03-24 DIAGNOSIS — Z96.651 STATUS POST TOTAL RIGHT KNEE REPLACEMENT: Primary | ICD-10-CM

## 2025-03-24 DIAGNOSIS — M17.0 PRIMARY OSTEOARTHRITIS OF BOTH KNEES: ICD-10-CM

## 2025-03-24 DIAGNOSIS — R26.9 GAIT ABNORMALITY: Primary | ICD-10-CM

## 2025-03-24 PROCEDURE — 97112 NEUROMUSCULAR REEDUCATION: CPT | Performed by: PHYSICAL THERAPIST

## 2025-03-24 PROCEDURE — 99024 POSTOP FOLLOW-UP VISIT: CPT | Performed by: ORTHOPAEDIC SURGERY

## 2025-03-24 PROCEDURE — 97140 MANUAL THERAPY 1/> REGIONS: CPT | Performed by: PHYSICAL THERAPIST

## 2025-03-24 PROCEDURE — 97110 THERAPEUTIC EXERCISES: CPT | Performed by: PHYSICAL THERAPIST

## 2025-03-24 RX ORDER — ASPIRIN 81 MG/1
TABLET ORAL
COMMUNITY

## 2025-03-24 NOTE — PATIENT INSTRUCTIONS
Transition off walker and able.  He needs aggressive physical therapy to work on his extension lag.  He was exercises in the office to do an addition of his therapy exercises.  He is to avoid sitting on the recliner with the knee flexed unless he has pillows underneath the heel.  Will see the patient back in 4 weeks for repeat evaluation.

## 2025-03-24 NOTE — PROGRESS NOTES
Name: Toni Mahmood      : 1935      MRN: [id number]  Encounter Provider: rBandon Bunn DO  Encounter Date: 3/24/2025   Encounter department: [facility] [place]  :  Assessment & Plan  Status post total right knee replacement  Transition off walker and able.  He needs aggressive physical therapy to work on his extension lag.  He was exercises in the office to do an addition of his therapy exercises.  He is to avoid sitting on the recliner with the knee flexed unless he has pillows underneath the heel.  Will see the patient back in 4 weeks for repeat evaluation.       History of Present Illness   HPI  Toni Mahmood is a 89 y.o. male who presents for follow-up of right total knee arthroplasty with date of surgery 2025, 8 weeks ago.    Review of Systems  Current Outpatient Medications on File Prior to Visit   Medication Sig Dispense Refill    acetaminophen (TYLENOL) 500 mg tablet Take 1 tablet (500 mg total) by mouth every 8 (eight) hours 30 tablet 1    amLODIPine (NORVASC) 2.5 mg tablet Take 5 mg by mouth daily      ascorbic acid (VITAMIN C) 500 MG tablet Take 1 tablet (500 mg total) by mouth 2 (two) times a day 120 tablet 0    aspirin (Aspirin 81) 81 mg EC tablet Take by mouth      cholecalciferol (VITAMIN D3) 25 mcg (1,000 units) tablet Take 1 tablet (1,000 Units total) by mouth daily 60 tablet 0    ferrous sulfate 325 (65 Fe) mg tablet Take 325 mg by mouth daily with breakfast      folic acid (FOLVITE) 1 mg tablet Take 1 tablet (1 mg total) by mouth daily 60 tablet 0    furosemide (LASIX) 20 mg tablet Take 20 mg by mouth daily      latanoprost (XALATAN) 0.005 % ophthalmic solution       lisinopril (ZESTRIL) 20 mg tablet       metFORMIN (GLUCOPHAGE) 500 mg tablet TAKE 1 TABLET BY MOUTH TWICE DAILY WITH MORNING MEAL AND WITH EVENING MEAL      Multiple Vitamins-Minerals (multivitamin with minerals) tablet Take 1 tablet by mouth daily 60 tablet 0    potassium chloride  "(MICRO-K) 10 MEQ CR capsule Take 10 mEq by mouth daily      [DISCONTINUED] aspirin 325 mg tablet Take 1 tablet (325 mg total) by mouth every 12 (twelve) hours 60 tablet 0    timolol (TIMOPTIC) 0.5 % ophthalmic solution  (Patient not taking: Reported on 3/24/2025)      zinc sulfate (ZINCATE) 220 mg capsule Take 1 capsule (220 mg total) by mouth daily (Patient not taking: Reported on 3/24/2025) 60 capsule 0     No current facility-administered medications on file prior to visit.      Social History     Tobacco Use    Smoking status: Never    Smokeless tobacco: Never    Tobacco comments:     Smoked at age 23 for about a year   Vaping Use    Vaping status: Never Used   Substance and Sexual Activity    Alcohol use: Not Currently     Comment: Occasional Beer    Drug use: Never    Sexual activity: Not on file        Objective   Ht 5' 6\" (1.676 m)   Wt 88.4 kg (194 lb 12.8 oz)   BMI 31.44 kg/m²      Physical Exam    Right knee incision is well-healed.  There is very minimal effusion.  Minimal swelling of the leg.  No calf pain no thigh pain.  Patient continues with extension of the leg.  His passive range of motion is approximately  and 120 degrees.  Passively 5 to 125 degrees.  Quad and hamstring strength 4+/5.  No gross ligamentous laxity.  Negative Homans' sign.  Light touch sensation intact.  "

## 2025-03-26 NOTE — PROGRESS NOTES
"Daily Note     Today's date: 3/27/2025  Patient name: Toni Mahmood  : 1935  MRN: [id number]  Referring provider: Brandon Bunn DO  Dx:   Encounter Diagnosis     ICD-10-CM    1. Gait abnormality  R26.9       2. Primary osteoarthritis of both knees  M17.0       3. H/O total knee replacement, right  Z96.651           Start Time: 1255  Stop Time: 1405  Total time in clinic (min): 70 minutes    Subjective:  Patient reports he is compliant with HEP with focus on TKE and feels increase soreness in the right knee with performance but states he knows he has to work to the limited movement.        Objective: See treatment diary below      Assessment: Tolerated treatment well.  PT notes continuation of decrease ROM and strength t/o the right knee and LE with demonstration of impaired gait pattern and balance s/p right TKA with need for continuation of skilled therapy.  PT notes focus on TKE with standing and walking to decrease knee flexion during the activity to assist with decrease strain to the right quad.        Plan: Continue per plan of care.      Precautions:  WBAT Right LE, PMH HTN, T2DM, OA  POC 25    Manuals 3/13 3/17 3/20 3/24 3/27   Right knee flex and ext  5 min 5 min 5 min  5 min  5 min    Right HS, hip ABD, quad, piriformis, and gastroc with manual hip traction  5 min 5 min  10 min Focus on TKE  10 min Focus on TKE  10 min Focus on TKE                    Neuro Re-Ed        Front and Lateral Lunge     10x Each Bilat  2x10 Each Bilat    STS- No UE  10x No Weight   10x 6# WB  2x10   6# WB  2x10   6# WB  2x10   8# WB    Bridge with ABD  2x10 Blue With Tball   10x5\" Hold  With Tball   2x10   5\" Hold  With Tball   2x10   5\" Hold  With Tball   2x10 5\" Hold    Rocker board- Tandem F/B and S/S  Side stepping // bars 8 laps NT  NT     BOSU March   2 min  3 min  3 min  3 min    TB TKE 10x3\" Hold DC HEP               Ther Ex        Nu-step for ROM and strength  10 min L5 10 min L6  10 min L6  SRC   10 min " "L3 SRC   10 min L3    Leg Press  Leg Press DL 75# SL R leg only 35# 2x10 ea 85# DL  45# SL  2x10 Bilat  95# DL  55# SL   2x10 Bilat  105# DL  55# SL  2x10 Each Bilat  105# DL  55# SL  2x10 Each Bilat    Quad set         Supine SLR  2x10 Right Only DC HEP       LAQ  2x10 3\" Hold 3# DC  HEP       F/L step up  2x10 Each 6\" step 10x Each Bilat   8\" step  10x Each   Bilat   8\" step  NT  DC   Stair HR/TR  2x10 Bliat 6\" step 2x10 Bilat   4\" Step  NT  2x10 Bilat   4\" step  2x10 Bilat   4\" step    HEP update/review    10 min      Ther Activity                        Gait Training                        Modalities        CP  15 min R knee 15 min Right Knee  15 min Right Knee 15 min Right Knee  15 min Right Knee                               "

## 2025-03-27 ENCOUNTER — OFFICE VISIT (OUTPATIENT)
Dept: PHYSICAL THERAPY | Facility: CLINIC | Age: 89
End: 2025-03-27
Payer: COMMERCIAL

## 2025-03-27 DIAGNOSIS — M17.0 PRIMARY OSTEOARTHRITIS OF BOTH KNEES: ICD-10-CM

## 2025-03-27 DIAGNOSIS — R26.9 GAIT ABNORMALITY: Primary | ICD-10-CM

## 2025-03-27 DIAGNOSIS — Z96.651 H/O TOTAL KNEE REPLACEMENT, RIGHT: ICD-10-CM

## 2025-03-27 PROCEDURE — 97110 THERAPEUTIC EXERCISES: CPT | Performed by: PHYSICAL THERAPIST

## 2025-03-27 PROCEDURE — 97112 NEUROMUSCULAR REEDUCATION: CPT | Performed by: PHYSICAL THERAPIST

## 2025-03-27 PROCEDURE — 97140 MANUAL THERAPY 1/> REGIONS: CPT | Performed by: PHYSICAL THERAPIST

## 2025-03-31 ENCOUNTER — OFFICE VISIT (OUTPATIENT)
Dept: PHYSICAL THERAPY | Facility: CLINIC | Age: 89
End: 2025-03-31
Payer: COMMERCIAL

## 2025-03-31 DIAGNOSIS — R26.9 GAIT ABNORMALITY: Primary | ICD-10-CM

## 2025-03-31 DIAGNOSIS — Z96.651 H/O TOTAL KNEE REPLACEMENT, RIGHT: ICD-10-CM

## 2025-03-31 DIAGNOSIS — M17.0 PRIMARY OSTEOARTHRITIS OF BOTH KNEES: ICD-10-CM

## 2025-03-31 PROCEDURE — 97140 MANUAL THERAPY 1/> REGIONS: CPT

## 2025-03-31 PROCEDURE — 97110 THERAPEUTIC EXERCISES: CPT

## 2025-03-31 PROCEDURE — 97112 NEUROMUSCULAR REEDUCATION: CPT

## 2025-03-31 NOTE — PROGRESS NOTES
"Daily Note     Today's date: 3/31/2025  Patient name: Toni Mahmood  : 1935  MRN: [id number]  Referring provider: Brandon Bunn DO  Dx:   Encounter Diagnosis     ICD-10-CM    1. Gait abnormality  R26.9       2. Primary osteoarthritis of both knees  M17.0       3. H/O total knee replacement, right  Z96.651                      Subjective: Patient reports increased pain in his knee today. He notes increased stiffness and soreness. He states \"I think I am just having a bad day\"       Objective: See treatment diary below      Assessment: Tolerated treatment fair despite increased soreness. Patient had some difficulty with lunges and needed more frequent rest breaks in between exercises. Patient demonstrated fatigue post treatment and would benefit from continued PT to improve functional mobility and to address ongoing impairments.        Plan: Continue per plan of care.      Precautions:  WBAT Right LE, PMH HTN, T2DM, OA  POC 25    Manuals 3/17 3/20 3/24 3/27 3/31   Right knee flex and ext  5 min 5 min  5 min  5 min  5 min   Right HS, hip ABD, quad, piriformis, and gastroc with manual hip traction  5 min  10 min Focus on TKE  10 min Focus on TKE  10 min Focus on TKE  10 min Focus on TKE                   Neuro Re-Ed        Front and Lateral Lunge    10x Each Bilat  2x10 Each Bilat  10x Each Bilat   STS- No UE 10x No Weight   10x 6# WB  2x10   6# WB  2x10   6# WB  2x10   8# WB  2x10 8# WB   Bridge with ABD  With Tball   10x5\" Hold  With Tball   2x10   5\" Hold  With Tball   2x10   5\" Hold  With Tball   2x10 5\" Hold  With Tball 2x10 5\" Hold   Rocker board- Tandem F/B and S/S  NT  NT      BOSU March  2 min  3 min  3 min  3 min  3 min   TB TKE DC HEP                Ther Ex        Nu-step for ROM and strength  10 min L6  10 min L6  SRC   10 min L3 SRC   10 min L3  SRC 10 min L3   Leg Press  85# DL  45# SL  2x10 Bilat  95# DL  55# SL   2x10 Bilat  105# DL  55# SL  2x10 Each Bilat  105# DL  55# SL  2x10 Each " "Bilat  105# DL 55# SL 2x10 Each Bilat   Quad set         Supine SLR  DC HEP        LAQ  DC  HEP        F/L step up  10x Each Bilat   8\" step  10x Each   Bilat   8\" step  NT  DC    Stair HR/TR  2x10 Bilat   4\" Step  NT  2x10 Bilat   4\" step  2x10 Bilat   4\" step  2x10 Bilat 4\" step   HEP update/review   10 min       Ther Activity                        Gait Training                        Modalities        CP  15 min Right Knee  15 min Right Knee 15 min Right Knee  15 min Right Knee  15 min Right Knee                                "

## 2025-04-02 NOTE — PROGRESS NOTES
"Daily Note     Today's date: 4/3/2025  Patient name: Toni Mahmood  : 1935  MRN: [id number]  Referring provider: Brandon Bunn DO  Dx:   Encounter Diagnosis     ICD-10-CM    1. Gait abnormality  R26.9       2. Primary osteoarthritis of both knees  M17.0       3. H/O total knee replacement, right  Z96.651                      Subjective: Patient reports some stiffness in his R knee today. He notes \"I have noticed a difference with the rainy weather\"      Objective: See treatment diary below      Assessment: Patient tolerated treatment well with continued steady progress towards functional strength and mobility goals. Patient exhibited good technique with therapeutic exercises and would benefit from continued skilled PT services to address ongoing impairments.        Plan: Continue per plan of care.      Precautions:  WBAT Right LE, PMH HTN, T2DM, OA  POC 25    Manuals 3/20 3/24 3/27 3/31 4/3   Right knee flex and ext  5 min  5 min  5 min  5 min 5 mi   Right HS, hip ABD, quad, piriformis, and gastroc with manual hip traction  10 min Focus on TKE  10 min Focus on TKE  10 min Focus on TKE  10 min Focus on TKE 10 min Focus on TKE                   Neuro Re-Ed        Front and Lateral Lunge   10x Each Bilat  2x10 Each Bilat  10x Each Bilat 10x each Bilat   STS- No UE 2x10   6# WB  2x10   6# WB  2x10   8# WB  2x10 8# WB 2x10 8#   Bridge with ABD  With Tball   2x10   5\" Hold  With Tball   2x10   5\" Hold  With Tball   2x10 5\" Hold  With Tball 2x10 5\" Hold With Tball 2x10 5\" Hold   Rocker board- Tandem F/B and S/S  NT       BOSU March  3 min  3 min  3 min  3 min 3 min   TB TKE                Ther Ex        Nu-step for ROM and strength  10 min L6  SRC   10 min L3 SRC   10 min L3  SRC 10 min L3 Nu Step 10 min L3    Leg Press  95# DL  55# SL   2x10 Bilat  105# DL  55# SL  2x10 Each Bilat  105# DL  55# SL  2x10 Each Bilat  105# DL 55# SL 2x10 Each Bilat 105# DL 55# SL 2x10 Each Bilat   Quad set         Supine " "SLR         LAQ         F/L step up  10x Each   Bilat   8\" step  NT  DC     Stair HR/TR  NT  2x10 Bilat   4\" step  2x10 Bilat   4\" step  2x10 Bilat 4\" step 2x10 Bilat 4\" step   HEP update/review  10 min        Ther Activity                        Gait Training                        Modalities        CP  15 min Right Knee 15 min Right Knee  15 min Right Knee  15 min Right Knee 15 min Right Knee                                  "

## 2025-04-03 ENCOUNTER — OFFICE VISIT (OUTPATIENT)
Dept: PHYSICAL THERAPY | Facility: CLINIC | Age: 89
End: 2025-04-03
Payer: COMMERCIAL

## 2025-04-03 DIAGNOSIS — M17.0 PRIMARY OSTEOARTHRITIS OF BOTH KNEES: ICD-10-CM

## 2025-04-03 DIAGNOSIS — Z96.651 H/O TOTAL KNEE REPLACEMENT, RIGHT: ICD-10-CM

## 2025-04-03 DIAGNOSIS — R26.9 GAIT ABNORMALITY: Primary | ICD-10-CM

## 2025-04-03 PROCEDURE — 97140 MANUAL THERAPY 1/> REGIONS: CPT

## 2025-04-03 PROCEDURE — 97112 NEUROMUSCULAR REEDUCATION: CPT

## 2025-04-03 PROCEDURE — 97110 THERAPEUTIC EXERCISES: CPT

## 2025-04-04 NOTE — PROGRESS NOTES
"Daily Note     Today's date: 2025  Patient name: Toni Mahmood  : 1935  MRN: [id number]  Referring provider: Brandon Bunn DO  Dx:   Encounter Diagnosis     ICD-10-CM    1. Gait abnormality  R26.9       2. Primary osteoarthritis of both knees  M17.0       3. H/O total knee replacement, right  Z96.651                      Subjective: Patient states that he is doing well. He notes that he is still having good days and bad days with his R knee.       Objective: See treatment diary below      Assessment: Patient able to tolerate treatment well today. Patient demonstrates consistent progress with strength and mobility, however, lack of knee flexion still persist. Treatment continues to focus on building flexibility and strength to improve knee mobility and build ms endurance. Patient to benefit from continued skilled PT services to address ongoing impairments and improve WB tolerance.     Plan: Continue per plan of care.      Precautions:  WBAT Right LE, PMH HTN, T2DM, OA  POC 25    Manuals 3/24 3/27 3/31 4/3 4/7   Right knee flex and ext  5 min  5 min  5 min 5 mi 5 min   Right HS, hip ABD, quad, piriformis, and gastroc with manual hip traction  10 min Focus on TKE  10 min Focus on TKE  10 min Focus on TKE 10 min Focus on TKE 10 min Focus on TKE                   Neuro Re-Ed        Front and Lateral Lunge  10x Each Bilat  2x10 Each Bilat  10x Each Bilat 10x each Bilat 10x each Bilat   STS- No UE 2x10   6# WB  2x10   8# WB  2x10 8# WB 2x10 8# 2x10 8# WB   Bridge with ABD  With Tball   2x10   5\" Hold  With Tball   2x10 5\" Hold  With Tball 2x10 5\" Hold With Tball 2x10 5\" Hold With Tball 2x10 5\" Hold   Rocker board- Tandem F/B and S/S         BOSU March  3 min  3 min  3 min 3 min 3 min   TB TKE                Ther Ex        Nu-step for ROM and strength  SRC   10 min L3 SRC   10 min L3  SRC 10 min L3 Nu Step 10 min L3  Nu Step 10 min L3   Leg Press  105# DL  55# SL  2x10 Each Bilat  105# DL  55# SL  2x10 " "Each Bilat  105# DL 55# SL 2x10 Each Bilat 105# DL 55# SL 2x10 Each Bilat 105# DL 55# SL 2x10 Each Bilat   Quad set         Supine SLR         LAQ         F/L step up  NT  DC      Stair HR/TR  2x10 Bilat   4\" step  2x10 Bilat   4\" step  2x10 Bilat 4\" step 2x10 Bilat 4\" step 2x10 Bilat 4\" step   HEP update/review         Ther Activity                        Gait Training                        Modalities        CP  15 min Right Knee  15 min Right Knee  15 min Right Knee 15 min Right Knee 15 min Right Knee                                    "

## 2025-04-07 ENCOUNTER — OFFICE VISIT (OUTPATIENT)
Dept: PHYSICAL THERAPY | Facility: CLINIC | Age: 89
End: 2025-04-07
Payer: COMMERCIAL

## 2025-04-07 DIAGNOSIS — R26.9 GAIT ABNORMALITY: Primary | ICD-10-CM

## 2025-04-07 DIAGNOSIS — Z96.651 H/O TOTAL KNEE REPLACEMENT, RIGHT: ICD-10-CM

## 2025-04-07 DIAGNOSIS — M17.0 PRIMARY OSTEOARTHRITIS OF BOTH KNEES: ICD-10-CM

## 2025-04-07 PROCEDURE — 97140 MANUAL THERAPY 1/> REGIONS: CPT

## 2025-04-07 PROCEDURE — 97112 NEUROMUSCULAR REEDUCATION: CPT

## 2025-04-07 PROCEDURE — 97110 THERAPEUTIC EXERCISES: CPT

## 2025-04-09 NOTE — PROGRESS NOTES
"Daily Note     Today's date: 4/10/2025  Patient name: Toni Mahmood  : 1935  MRN: [id number]  Referring provider: Brandon Bunn DO  Dx:   Encounter Diagnosis     ICD-10-CM    1. Gait abnormality  R26.9       2. Primary osteoarthritis of both knees  M17.0       3. H/O total knee replacement, right  Z96.651                      Subjective: Patient states that he is doing better but frustrated with his progress. He feels like he is still having trouble walking.       Objective: See treatment diary below      Assessment: Patient able to tolerate treatment well today. Patient demonstrates consistent progress with strength and mobility, however, lack of knee flexion still persist. Treatment continues to focus on building flexibility and strength to improve knee mobility and build ms endurance. Patient to benefit from continued skilled PT services to address ongoing impairments and improve WB tolerance.       Plan: Continue per plan of care.      Precautions:  WBAT Right LE, PMH HTN, T2DM, OA  POC 25    Manuals 3/27 3/31 4/3 4/7 4/10   Right knee flex and ext  5 min  5 min 5 mi 5 min 5 min   Right HS, hip ABD, quad, piriformis, and gastroc with manual hip traction  10 min Focus on TKE  10 min Focus on TKE 10 min Focus on TKE 10 min Focus on TKE 10 min Focus on TKE                   Neuro Re-Ed        Front and Lateral Lunge  2x10 Each Bilat  10x Each Bilat 10x each Bilat 10x each Bilat 10x each Bilat   STS- No UE 2x10   8# WB  2x10 8# WB 2x10 8# 2x10 8# WB 2x10 8# WB   Bridge with ABD  With Tball   2x10 5\" Hold  With Tball 2x10 5\" Hold With Tball 2x10 5\" Hold With Tball 2x10 5\" Hold With Tball 2x10 5\" Hold   Rocker board- Tandem F/B and S/S         BOSU March  3 min  3 min 3 min 3 min 3 min   TB TKE                Ther Ex        Nu-step for ROM and strength  SRC   10 min L3  SRC 10 min L3 Nu Step 10 min L3  Nu Step 10 min L3 Nu step 10 min L3   Leg Press  105# DL  55# SL  2x10 Each Bilat  105# DL 55# SL " "2x10 Each Bilat 105# DL 55# SL 2x10 Each Bilat 105# DL 55# SL 2x10 Each Bilat 105# DL 55# SL 2x10 Each Bilat   Quad set         Supine SLR         LAQ         F/L step up  DC       Stair HR/TR  2x10 Bilat   4\" step  2x10 Bilat 4\" step 2x10 Bilat 4\" step 2x10 Bilat 4\" step 2x10 Bilat 4\" step   HEP update/review         Ther Activity                        Gait Training                        Modalities        CP  15 min Right Knee  15 min Right Knee 15 min Right Knee 15 min Right Knee 15 min Right Knee                                      "

## 2025-04-10 ENCOUNTER — OFFICE VISIT (OUTPATIENT)
Dept: PHYSICAL THERAPY | Facility: CLINIC | Age: 89
End: 2025-04-10
Payer: COMMERCIAL

## 2025-04-10 DIAGNOSIS — R26.9 GAIT ABNORMALITY: Primary | ICD-10-CM

## 2025-04-10 DIAGNOSIS — Z96.651 H/O TOTAL KNEE REPLACEMENT, RIGHT: ICD-10-CM

## 2025-04-10 DIAGNOSIS — M17.0 PRIMARY OSTEOARTHRITIS OF BOTH KNEES: ICD-10-CM

## 2025-04-10 PROCEDURE — 97140 MANUAL THERAPY 1/> REGIONS: CPT

## 2025-04-10 PROCEDURE — 97110 THERAPEUTIC EXERCISES: CPT

## 2025-04-10 PROCEDURE — 97112 NEUROMUSCULAR REEDUCATION: CPT

## 2025-04-11 NOTE — PROGRESS NOTES
"Daily Note     Today's date: 2025  Patient name: Toni Mahmood  : 1935  MRN: [id number]  Referring provider: Brandon Bunn DO  Dx:   Encounter Diagnosis     ICD-10-CM    1. Gait abnormality  R26.9       2. Primary osteoarthritis of both knees  M17.0       3. H/O total knee replacement, right  Z96.651                      Subjective: Patient states that his knee is still pretty tight. He notes not much pain in his knee but more weakness.       Objective: See treatment diary below      Assessment: Patient able to tolerate treatment well today. Patient demonstrates consistent progress with strength and mobility, however, lack of knee flexion still persist. Treatment continues to focus on building flexibility and strength to improve knee mobility and build endurance. Patient to benefit from continued skilled PT services to address ongoing impairments.        Plan: Continue per plan of care.      Precautions:  WBAT Right LE, PMH HTN, T2DM, OA  POC 25    Manuals 3/31 4/3 4/7 4/10 4/14   Right knee flex and ext  5 min 5 mi 5 min 5 min 5 min   Right HS, hip ABD, quad, piriformis, and gastroc with manual hip traction  10 min Focus on TKE 10 min Focus on TKE 10 min Focus on TKE 10 min Focus on TKE 10 min Focus on TKE                   Neuro Re-Ed        Front and Lateral Lunge  10x Each Bilat 10x each Bilat 10x each Bilat 10x each Bilat 10x ea Bilat   STS- No UE 2x10 8# WB 2x10 8# 2x10 8# WB 2x10 8# WB 2x10 8# WB   Bridge with ABD  With Tball 2x10 5\" Hold With Tball 2x10 5\" Hold With Tball 2x10 5\" Hold With Tball 2x10 5\" Hold With Tball 2x10  5\" Hold   Rocker board- Tandem F/B and S/S         BOSU March  3 min 3 min 3 min 3 min 3 min   TB TKE                Ther Ex        Nu-step for ROM and strength  SRC 10 min L3 Nu Step 10 min L3  Nu Step 10 min L3 Nu step 10 min L3 Nu step 10 min L3   Leg Press  105# DL 55# SL 2x10 Each Bilat 105# DL 55# SL 2x10 Each Bilat 105# DL 55# SL 2x10 Each Bilat 105# DL 55# " "SL 2x10 Each Bilat 105# DL 55# SL 2x10 Each Bilat   Quad set         Supine SLR         LAQ         F/L step up         Stair HR/TR  2x10 Bilat 4\" step 2x10 Bilat 4\" step 2x10 Bilat 4\" step 2x10 Bilat 4\" step 2x10 Bilat 4\" step   HEP update/review         Ther Activity                        Gait Training                        Modalities        CP  15 min Right Knee 15 min Right Knee 15 min Right Knee 15 min Right Knee 15 min Right Knee                                        "

## 2025-04-14 ENCOUNTER — OFFICE VISIT (OUTPATIENT)
Dept: PHYSICAL THERAPY | Facility: CLINIC | Age: 89
End: 2025-04-14
Payer: COMMERCIAL

## 2025-04-14 DIAGNOSIS — R26.9 GAIT ABNORMALITY: Primary | ICD-10-CM

## 2025-04-14 DIAGNOSIS — Z96.651 H/O TOTAL KNEE REPLACEMENT, RIGHT: ICD-10-CM

## 2025-04-14 DIAGNOSIS — M17.0 PRIMARY OSTEOARTHRITIS OF BOTH KNEES: ICD-10-CM

## 2025-04-14 PROCEDURE — 97110 THERAPEUTIC EXERCISES: CPT

## 2025-04-14 PROCEDURE — 97140 MANUAL THERAPY 1/> REGIONS: CPT

## 2025-04-14 PROCEDURE — 97112 NEUROMUSCULAR REEDUCATION: CPT

## 2025-04-17 ENCOUNTER — EVALUATION (OUTPATIENT)
Dept: PHYSICAL THERAPY | Facility: CLINIC | Age: 89
End: 2025-04-17
Attending: ORTHOPAEDIC SURGERY
Payer: COMMERCIAL

## 2025-04-17 DIAGNOSIS — R26.9 GAIT ABNORMALITY: Primary | ICD-10-CM

## 2025-04-17 DIAGNOSIS — Z96.651 H/O TOTAL KNEE REPLACEMENT, RIGHT: ICD-10-CM

## 2025-04-17 DIAGNOSIS — M17.0 PRIMARY OSTEOARTHRITIS OF BOTH KNEES: ICD-10-CM

## 2025-04-17 PROCEDURE — 97112 NEUROMUSCULAR REEDUCATION: CPT | Performed by: PHYSICAL THERAPIST

## 2025-04-17 PROCEDURE — 97110 THERAPEUTIC EXERCISES: CPT | Performed by: PHYSICAL THERAPIST

## 2025-04-17 PROCEDURE — 97140 MANUAL THERAPY 1/> REGIONS: CPT | Performed by: PHYSICAL THERAPIST

## 2025-04-17 NOTE — PROGRESS NOTES
PT Re-Evaluation     Today's date: 2025  Patient name: Toni Mahmood  : 1935  MRN: [id number]  Referring provider: Brandon Bunn DO  Dx:   Encounter Diagnosis     ICD-10-CM    1. Gait abnormality  R26.9       2. Primary osteoarthritis of both knees  M17.0       3. H/O total knee replacement, right  Z96.651                      Assessment  Impairments: abnormal gait, abnormal or restricted ROM, activity intolerance, impaired balance, impaired physical strength, lacks appropriate home exercise program, pain with function, unable to perform ADL and activity limitations    Assessment details: PT notes the patient with continuation of decrease ROM and strength t/o the right knee and LE with demonstration of impaired gait pattern and balance s/p right TKA leading to increase pain levels and functional limitations with need for a continuation of p/o OPPT for 4 weeks with focus on gait/balance, manual therapy, strengthening, analgesic modalities, and transition to HEP.  PT notes the limitations with TKE which PT will continue to address with POC and HEP to meet therapy goals.    Understanding of Dx/Px/POC: good     Prognosis: good    Goals  ST.  Initiate HEP-MET  2.  Decrease pain levels by 25-50%-MET   3.  Increase ROM by 25-50%-MET   4.  Increase strength by 1-2 mm grades-MET  LT.  Improve gait pattern and balance to good/normal level with least restrictive AD-Progressing   2.  Decrease limitations with standing, walking and stairs-Progressing   3.  Decrease limitations with transfers and ADL-Progressing   4.  Decrease limitations with squatting, lifting and carrying-Progressing   5.  DC with HEP-Progressing       Plan  Patient would benefit from: skilled physical therapy  Planned modality interventions: cryotherapy    Planned therapy interventions: manual therapy, neuromuscular re-education, patient/caregiver education, self care, strengthening, stretching, therapeutic exercise, balance,  balance/weight bearing training, flexibility, gait training, graded exercise and home exercise program    Frequency: 2x week  Duration in weeks: 4  Treatment plan discussed with: patient  Plan details: Transition to HEP.         Subjective Evaluation    History of Present Illness  Mechanism of injury: Presently the patient has attended multiple sessions of skilled therapy and feels 70-80% improvement since the start of therapy.  Patient feels the knee is moving better with increase strength but weakness in the thigh with difficulty with transfers, ambulation and balance with need or continuation of use of RW with ambulation.  Patient reports he continues to try and work on TKE in the right knee with HEP.  Patient feels he requires additional therapy to continue to address weakness and walking deficits.    Patient Goals  Patient goals for therapy: decreased pain, improved balance, increased motion, increased strength and independence with ADLs/IADLs    Pain  Current pain ratin  At best pain ratin  At worst pain ratin  Location: Right Knee  Quality: sharp, dull ache and discomfort  Relieving factors: rest  Aggravating factors: stair climbing, walking, standing and lifting  Progression: improved    Treatments  Previous treatment: injection treatment  Current treatment: injection treatment and physical therapy  Discharged from (in last 30 days): inpatient hospitalization        Objective     Observations     Right Knee   Positive for incision.     Additional Observation Details  PT notes well healed surgical scar with no signs of infection but ecchymosis noted t/o the medial knee joint and LE     Palpation     Right   Tenderness of the vastus lateralis and vastus medialis.     Tenderness     Right Knee   Tenderness in the lateral joint line and medial joint line. No tenderness in the LCL (distal), LCL (proximal), MCL (distal), MCL (proximal), patellar tendon and quadriceps tendon.     Neurological Testing      Reflexes   Left   Patellar (L4): trace (1+)  Achilles (S1): trace (1+)    Right   Patellar (L4): trace (1+)  Achilles (S1): trace (1+)    Active Range of Motion     Right Hip   Flexion: 55 degrees   Extension: 4 degrees   Abduction: WFL  External rotation (90/90): WFL  Internal rotation (90/90): WFL    Right Knee   Flexion: 107 degrees   Extension: -13 degrees with pain    Additional Active Range of Motion Details  PT notes decrease ROM and strength t/o the right knee and LE     Passive Range of Motion     Right Hip   Flexion: 61 degrees   Extension: WFL    Right Knee   Flexion: 113 degrees   Extension: -4 degrees with pain    Mobility   Patellar Mobility:     Right Knee   Hypomobile: medial, lateral, superior and inferior     Strength/Myotome Testing     Right Hip   Planes of Motion   Flexion: 4  Extension: 5  Abduction: 4+  Adduction: 5  External rotation: 4+  Internal rotation: 4+    Right Knee   Flexion: 5  Extension: 4-  Quadriceps contraction: fair    Tests     Right Hip   Negative NATHAN and DIMITRI.   Chester: Positive.   Modified Chester: Positive.   90/90 SLR: Positive.   SLR: Positive.     Right Knee   Negative anterior Lachman, lateral Reina, medial Reina, posterior Lachman, valgus stress test at 0 degrees and varus stress test at 0 degrees.     Swelling     Left Knee Girth Measurement (cm)   Joint line: 40 cm    Right Knee Girth Measurement (cm)   Joint line: 42 cm    Ambulation   Weight-Bearing Status   Weight-Bearing Status (Right): weight-bearing as tolerated    Assistive device used: front-wheeled walker and single point cane    Observational Gait   Gait: antalgic   Decreased walking speed, stride length and right stance time.              Precautions:  WBAT Right LE, PMH HTN, T2DM, OA  POC 5/17/25    Manuals 3/31 4/3 4/7 4/10 4/14 4/17   Right knee flex and ext  5 min 5 mi 5 min 5 min 5 min 5 min    Right HS, hip ABD, quad, piriformis, and gastroc with manual hip traction  10 min Focus on TKE  "10 min Focus on TKE 10 min Focus on TKE 10 min Focus on TKE 10 min Focus on TKE 10 min with Focus on TKE                      Neuro Re-Ed         Front and Lateral Lunge  10x Each Bilat 10x each Bilat 10x each Bilat 10x each Bilat 10x ea Bilat 10x Each Bilat    STS- No UE 2x10 8# WB 2x10 8# 2x10 8# WB 2x10 8# WB 2x10 8# WB 2x10   8# WB    Bridge with ABD  With Tball 2x10 5\" Hold With Tball 2x10 5\" Hold With Tball 2x10 5\" Hold With Tball 2x10 5\" Hold With Tball 2x10  5\" Hold S/L Bridge   2x10 Bilat    Rocker board- Tandem F/B and S/S          [facility] March  3 min 3 min 3 min 3 min 3 min NT   TB TKE                  Ther Ex         Nu-step for ROM and strength  SRC 10 min L3 Nu Step 10 min L3  Nu Step 10 min L3 Nu step 10 min L3 Nu step 10 min L3 Nu-step   10 min L5    Leg Press  105# DL 55# SL 2x10 Each Bilat 105# DL 55# SL 2x10 Each Bilat 105# DL 55# SL 2x10 Each Bilat 105# DL 55# SL 2x10 Each Bilat 105# DL 55# SL 2x10 Each Bilat SL Only Right LE   10x Each  55#/ 65#/ 75#    Quad set          Supine SLR          LAQ          F/L step up          Stair HR/TR  2x10 Bilat 4\" step 2x10 Bilat 4\" step 2x10 Bilat 4\" step 2x10 Bilat 4\" step 2x10 Bilat 4\" step NT    HEP update/review          Ther Activity                           Gait Training                           Modalities         CP  15 min Right Knee 15 min Right Knee 15 min Right Knee 15 min Right Knee 15 min Right Knee 15 min Right Knee                                            "

## 2025-04-18 NOTE — PROGRESS NOTES
"Daily Note     Today's date: 2025  Patient name: Toni Mahmood  : 1935  MRN: [id number]  Referring provider: Brandon Bunn DO  Dx:   Encounter Diagnosis     ICD-10-CM    1. Gait abnormality  R26.9       2. Primary osteoarthritis of both knees  M17.0       3. H/O total knee replacement, right  Z96.651                      Subjective: Patient states that his legs were feeling \"tired\" this weekend.       Objective: See treatment diary below      Assessment: Patient able to tolerate treatment well today. Patient demonstrates consistent progress with strength and mobility, however, lack of knee extension still persist. Treatment continues to focus on building flexibility and strength to improve knee mobility and build endurance. Patient to benefit from continued skilled PT services to address ongoing impairments.        Plan: Continue per plan of care.      Precautions:  WBAT Right LE, PMH HTN, T2DM, OA  POC 25    Manuals 4/3 4/7 4/10 4/14 4/17 4/21   Right knee flex and ext  5 mi 5 min 5 min 5 min 5 min  5 min   Right HS, hip ABD, quad, piriformis, and gastroc with manual hip traction  10 min Focus on TKE 10 min Focus on TKE 10 min Focus on TKE 10 min Focus on TKE 10 min with Focus on TKE  10 min with Focus on TKE                     Neuro Re-Ed         Front and Lateral Lunge  10x each Bilat 10x each Bilat 10x each Bilat 10x ea Bilat 10x Each Bilat  10X Each Bilat   STS- No UE 2x10 8# 2x10 8# WB 2x10 8# WB 2x10 8# WB 2x10   8# WB  2x10 8# WB   Bridge with ABD  With Tball 2x10 5\" Hold With Tball 2x10 5\" Hold With Tball 2x10 5\" Hold With Tball 2x10  5\" Hold S/L Bridge   2x10 Bilat  S/L Bridge 2x10 Bilat   Rocker board- Tandem F/B and S/S          U March  3 min 3 min 3 min 3 min NT 3 min   TB TKE                  Ther Ex         Nu-step for ROM and strength  Nu Step 10 min L3  Nu Step 10 min L3 Nu step 10 min L3 Nu step 10 min L3 Nu-step   10 min L5  Nu step 10 min L5    Leg Press  105# DL 55# " "SL 2x10 Each Bilat 105# DL 55# SL 2x10 Each Bilat 105# DL 55# SL 2x10 Each Bilat 105# DL 55# SL 2x10 Each Bilat SL Only Right LE   10x Each  55#/ 65#/ 75#  SL Only Right LE 10X Each 55# 65# 75#   Quad set          Supine SLR          LAQ          F/L step up          Stair HR/TR  2x10 Bilat 4\" step 2x10 Bilat 4\" step 2x10 Bilat 4\" step 2x10 Bilat 4\" step NT  2x10 Bilat 4\" step   HEP update/review          Ther Activity                           Gait Training                           Modalities         CP  15 min Right Knee 15 min Right Knee 15 min Right Knee 15 min Right Knee 15 min Right Knee  15 min Right Knee                                                "

## 2025-04-21 ENCOUNTER — OFFICE VISIT (OUTPATIENT)
Dept: PHYSICAL THERAPY | Facility: CLINIC | Age: 89
End: 2025-04-21
Payer: COMMERCIAL

## 2025-04-21 DIAGNOSIS — Z96.651 H/O TOTAL KNEE REPLACEMENT, RIGHT: ICD-10-CM

## 2025-04-21 DIAGNOSIS — M17.0 PRIMARY OSTEOARTHRITIS OF BOTH KNEES: ICD-10-CM

## 2025-04-21 DIAGNOSIS — R26.9 GAIT ABNORMALITY: Primary | ICD-10-CM

## 2025-04-21 PROCEDURE — 97140 MANUAL THERAPY 1/> REGIONS: CPT

## 2025-04-21 PROCEDURE — 97112 NEUROMUSCULAR REEDUCATION: CPT

## 2025-04-21 PROCEDURE — 97110 THERAPEUTIC EXERCISES: CPT

## 2025-04-23 ENCOUNTER — OFFICE VISIT (OUTPATIENT)
Dept: OBGYN CLINIC | Facility: CLINIC | Age: 89
End: 2025-04-23

## 2025-04-23 VITALS — HEIGHT: 66 IN | WEIGHT: 196.2 LBS | BODY MASS INDEX: 31.53 KG/M2

## 2025-04-23 DIAGNOSIS — Z96.651 PRESENCE OF ARTIFICIAL KNEE JOINT, RIGHT: Primary | ICD-10-CM

## 2025-04-23 DIAGNOSIS — Z47.1 AFTERCARE FOLLOWING RIGHT KNEE JOINT REPLACEMENT SURGERY: ICD-10-CM

## 2025-04-23 DIAGNOSIS — Z96.651 AFTERCARE FOLLOWING RIGHT KNEE JOINT REPLACEMENT SURGERY: ICD-10-CM

## 2025-04-23 PROCEDURE — 99024 POSTOP FOLLOW-UP VISIT: CPT | Performed by: ORTHOPAEDIC SURGERY

## 2025-04-23 NOTE — PROGRESS NOTES
"Patient Name:  Toni Mahmood  MRN:  [id number]    Assessment & Plan  Presence of artificial knee joint, right  Would recommend follow-up in 4 to 6 weeks.  He is to continue working with physical therapy and I have encouraged him to become more active.  His wife admits that he is less likely to be active as he is unhappy that he is still using the walker.  I have encouraged him to transition to a cane if he is able to do so.  He did take several steps in the office without the use of a walker and admitted he did not feel any differently walking without the walker than he did with the walker.       Aftercare following right knee joint replacement surgery           Return for 4 to 6 weeks.      Subjective   Toni Mahmood returns for follow-up of his right total knee.  The patient is 3 month(s) post op and returns for routine follow-up. Patient  continues to use a walker and states he is unhappy he is doing so.  He states that physical therapy has not tried to transition him off the walker for at least a few weeks.  He notes difficulty straightening his knee.  He had an episode of popping in the knee recently although this did not cause pain and has not reoccurred.  His wife states that he does not want to be active anymore because he is using a walker and seems to be unwilling to do things because of the walker.      Objective     Ht 5' 6\" (1.676 m)   Wt 89 kg (196 lb 3.2 oz)   BMI 31.67 kg/m²     Exam today demonstrates Bill to have 5 degree flexion contracture actively.  I am able to passively extend the knee within a degree or so of full extension.  He can flex to about 100 degrees.  He was unable to arise from a chair without the use of his hands.  He ambulates with a walker with the knee in a slightly flexed position during stance phase when he should be in full extension.  I was able to get him  to walk several steps without the walker with a similar pattern of gait and no significant difficulty doing so.  " Ligamentous exam of the knee is stable.  There is no effusion.  The incision is well-healed.  He has 4+/5 strength of knee extension and 5/5 strength of knee flexion.      Data Review     Physical therapy notes were reviewed.    Brandon Bunn DO

## 2025-04-23 NOTE — PROGRESS NOTES
"Daily Note     Today's date: 2025  Patient name: Toni Mahmood  : 1935  MRN: [id number]  Referring provider: Brandon Bunn DO  Dx:   Encounter Diagnosis     ICD-10-CM    1. Gait abnormality  R26.9       2. Primary osteoarthritis of both knees  M17.0       3. H/O total knee replacement, right  Z96.651                      Subjective: Patient states that he is frustrated because he would like to be walking with a cane but his leg still feels weak.       Objective: See treatment diary below      Assessment: Patient able to tolerate treatment well today. Patient demonstrates consistent progress with strength and mobility, however, lack of R knee and hip strength still persist. Treatment continues to focus on building flexibility and strength to improve knee mobility and build endurance. Patient to benefit from continued skilled PT services to address ongoing impairments.        Plan: Continue per plan of care.      Precautions:  WBAT Right LE, PMH HTN, T2DM, OA  POC 25    Manuals 4/7 4/10 4/14 4/17 4/21 4/24   Right knee flex and ext  5 min 5 min 5 min 5 min  5 min 5 min   Right HS, hip ABD, quad, piriformis, and gastroc with manual hip traction  10 min Focus on TKE 10 min Focus on TKE 10 min Focus on TKE 10 min with Focus on TKE  10 min with Focus on TKE 10 min with Focus on TKE                     Neuro Re-Ed         Front and Lateral Lunge  10x each Bilat 10x each Bilat 10x ea Bilat 10x Each Bilat  10X Each Bilat 10x Each Bilat   STS- No UE 2x10 8# WB 2x10 8# WB 2x10 8# WB 2x10   8# WB  2x10 8# WB 2x10 8# WB   Bridge with ABD  With Tball 2x10 5\" Hold With Tball 2x10 5\" Hold With Tball 2x10  5\" Hold S/L Bridge   2x10 Bilat  S/L Bridge 2x10 Bilat S/L Bridge 2x10 Bilat   Rocker board- Tandem F/B and S/S          [facility] March  3 min 3 min 3 min NT 3 min 3 min   TB TKE                  Ther Ex         Nu-step for ROM and strength  Nu Step 10 min L3 Nu step 10 min L3 Nu step 10 min L3 Nu-step   10 min " "L5  Nu step 10 min L5  Nu step 10 min L5   Leg Press  105# DL 55# SL 2x10 Each Bilat 105# DL 55# SL 2x10 Each Bilat 105# DL 55# SL 2x10 Each Bilat SL Only Right LE   10x Each  55#/ 65#/ 75#  SL Only Right LE 10X Each 55# 65# 75# SL Only Right LE 10X Each 55# 65# 75#   Quad set          Supine SLR       S/L R leg raise 10x   LAQ          F/L step up          Stair HR/TR  2x10 Bilat 4\" step 2x10 Bilat 4\" step 2x10 Bilat 4\" step NT  2x10 Bilat 4\" step 2x10 Bilat 4\" step   HEP update/review          Ther Activity                           Gait Training                           Modalities         CP  15 min Right Knee 15 min Right Knee 15 min Right Knee 15 min Right Knee  15 min Right Knee 15 min Right Knee                                                  "

## 2025-04-23 NOTE — ASSESSMENT & PLAN NOTE
Would recommend follow-up in 4 to 6 weeks.  He is to continue working with physical therapy and I have encouraged him to become more active.  His wife admits that he is less likely to be active as he is unhappy that he is still using the walker.  I have encouraged him to transition to a cane if he is able to do so.  He did take several steps in the office without the use of a walker and admitted he did not feel any differently walking without the walker than he did with the walker.

## 2025-04-24 ENCOUNTER — OFFICE VISIT (OUTPATIENT)
Dept: PHYSICAL THERAPY | Facility: CLINIC | Age: 89
End: 2025-04-24
Payer: COMMERCIAL

## 2025-04-24 DIAGNOSIS — M17.0 PRIMARY OSTEOARTHRITIS OF BOTH KNEES: ICD-10-CM

## 2025-04-24 DIAGNOSIS — Z96.651 H/O TOTAL KNEE REPLACEMENT, RIGHT: ICD-10-CM

## 2025-04-24 DIAGNOSIS — R26.9 GAIT ABNORMALITY: Primary | ICD-10-CM

## 2025-04-24 PROCEDURE — 97140 MANUAL THERAPY 1/> REGIONS: CPT

## 2025-04-24 PROCEDURE — 97112 NEUROMUSCULAR REEDUCATION: CPT

## 2025-04-24 PROCEDURE — 97110 THERAPEUTIC EXERCISES: CPT

## 2025-04-25 NOTE — PROGRESS NOTES
"Daily Note     Today's date: 2025  Patient name: Toni Mahmood  : 1935  MRN: [id number]  Referring provider: Brandon Bunn DO  Dx:   Encounter Diagnosis     ICD-10-CM    1. Gait abnormality  R26.9       2. Primary osteoarthritis of both knees  M17.0       3. H/O total knee replacement, right  Z96.651                      Subjective: Patient states that his knee has been feeling a little stronger. He notes that he still would like to get off his walker and use a cane but he is not sure if he is strong enough.       Objective: See treatment diary below      Assessment: Patient able to tolerate treatment well today. Patient demonstrates consistent progress with strength and mobility, however, lack of R knee and hip strength still persist. Treatment continues to focus on building flexibility and strength to improve knee mobility and build endurance. Patient to benefit from continued skilled PT services to address ongoing impairments.        Plan: Continue per plan of care.      Precautions:  WBAT Right LE, PMH HTN, T2DM, OA  POC 25    Manuals 4/10 4/14 4/17 4/21 4/24 4/28   Right knee flex and ext  5 min 5 min 5 min  5 min 5 min 5 min   Right HS, hip ABD, quad, piriformis, and gastroc with manual hip traction  10 min Focus on TKE 10 min Focus on TKE 10 min with Focus on TKE  10 min with Focus on TKE 10 min with Focus on TKE 10 min with Focus on TKE                     Neuro Re-Ed         Front and Lateral Lunge  10x each Bilat 10x ea Bilat 10x Each Bilat  10X Each Bilat 10x Each Bilat 10x Each Bilat   STS- No UE 2x10 8# WB 2x10 8# WB 2x10   8# WB  2x10 8# WB 2x10 8# WB 2x10 8# WB   Bridge with ABD  With Tball 2x10 5\" Hold With Tball 2x10  5\" Hold S/L Bridge   2x10 Bilat  S/L Bridge 2x10 Bilat S/L Bridge 2x10 Bilat S/L Bridge 2x10 Bilat   Rocker board- Tandem F/B and S/S          [facility] March  3 min 3 min NT 3 min 3 min 3 min   TB TKE                  Ther Ex         Nu-step for ROM and strength  Nu " "step 10 min L3 Nu step 10 min L3 Nu-step   10 min L5  Nu step 10 min L5  Nu step 10 min L5 Nu step 10 min L5   Leg Press  105# DL 55# SL 2x10 Each Bilat 105# DL 55# SL 2x10 Each Bilat SL Only Right LE   10x Each  55#/ 65#/ 75#  SL Only Right LE 10X Each 55# 65# 75# SL Only Right LE 10X Each 55# 65# 75# SL Only Right LE 10X Each 55# 65# 75#   Quad set          Supine SLR      S/L R leg raise 10x S/L R leg raise 2x10   LAQ          F/L step up          Stair HR/TR  2x10 Bilat 4\" step 2x10 Bilat 4\" step NT  2x10 Bilat 4\" step 2x10 Bilat 4\" step 2x10 Bilat 4\" step   HEP update/review          Ther Activity                           Gait Training                           Modalities         CP  15 min Right Knee 15 min Right Knee 15 min Right Knee  15 min Right Knee 15 min Right Knee 15 min Right Knee                                                    "

## 2025-04-28 ENCOUNTER — OFFICE VISIT (OUTPATIENT)
Dept: PHYSICAL THERAPY | Facility: CLINIC | Age: 89
End: 2025-04-28
Payer: COMMERCIAL

## 2025-04-28 DIAGNOSIS — M17.0 PRIMARY OSTEOARTHRITIS OF BOTH KNEES: ICD-10-CM

## 2025-04-28 DIAGNOSIS — R26.9 GAIT ABNORMALITY: Primary | ICD-10-CM

## 2025-04-28 DIAGNOSIS — Z96.651 H/O TOTAL KNEE REPLACEMENT, RIGHT: ICD-10-CM

## 2025-04-28 PROCEDURE — 97110 THERAPEUTIC EXERCISES: CPT

## 2025-04-28 PROCEDURE — 97140 MANUAL THERAPY 1/> REGIONS: CPT

## 2025-04-28 PROCEDURE — 97112 NEUROMUSCULAR REEDUCATION: CPT

## 2025-05-01 ENCOUNTER — OFFICE VISIT (OUTPATIENT)
Dept: PHYSICAL THERAPY | Facility: CLINIC | Age: 89
End: 2025-05-01
Attending: ORTHOPAEDIC SURGERY
Payer: COMMERCIAL

## 2025-05-01 DIAGNOSIS — M17.0 PRIMARY OSTEOARTHRITIS OF BOTH KNEES: ICD-10-CM

## 2025-05-01 DIAGNOSIS — Z96.651 H/O TOTAL KNEE REPLACEMENT, RIGHT: ICD-10-CM

## 2025-05-01 DIAGNOSIS — R26.9 GAIT ABNORMALITY: Primary | ICD-10-CM

## 2025-05-01 PROCEDURE — 97112 NEUROMUSCULAR REEDUCATION: CPT

## 2025-05-01 PROCEDURE — 97110 THERAPEUTIC EXERCISES: CPT

## 2025-05-01 PROCEDURE — 97140 MANUAL THERAPY 1/> REGIONS: CPT

## 2025-05-01 NOTE — PROGRESS NOTES
"Daily Note     Today's date: 2025  Patient name: Toni Mahmood  : 1935  MRN: [id number]  Referring provider: Brandon Bunn DO  Dx:   Encounter Diagnosis     ICD-10-CM    1. Gait abnormality  R26.9       2. Primary osteoarthritis of both knees  M17.0       3. H/O total knee replacement, right  Z96.651                      Subjective: Patient states that he feels like he is getting stronger. He notes that he would like to walk with a cane but he is still using his walker.       Objective: See treatment diary below      Assessment: Patient able to tolerate treatment well today. Patient demonstrates consistent progress with strength and mobility, however, lack of quad strength still persist. Treatment continues to focus on building flexibility and strength to improve knee mobility and build ms endurance. Patient to benefit from continued skilled PT services to address ongoing impairments.        Plan: Continue per plan of care.      Precautions:  WBAT Right LE, PMH HTN, T2DM, OA  POC 25    Manuals    Right knee flex and ext  5 min 5 min  5 min 5 min 5 min 5 min   Right HS, hip ABD, quad, piriformis, and gastroc with manual hip traction  10 min Focus on TKE 10 min with Focus on TKE  10 min with Focus on TKE 10 min with Focus on TKE 10 min with Focus on TKE 10 min  with Focus on TKE                     Neuro Re-Ed         Front and Lateral Lunge  10x ea Bilat 10x Each Bilat  10X Each Bilat 10x Each Bilat 10x Each Bilat 10x Each Bilat   STS- No UE 2x10 8# WB 2x10   8# WB  2x10 8# WB 2x10 8# WB 2x10 8# WB 2x10 8# WB   Bridge with ABD  With Tball 2x10  5\" Hold S/L Bridge   2x10 Bilat  S/L Bridge 2x10 Bilat S/L Bridge 2x10 Bilat S/L Bridge 2x10 Bilat S/L Bridge 2x10 Bilat    Rocker board- Tandem F/B and S/S          [facility] March  3 min NT 3 min 3 min 3 min 3 min   TB TKE                  Ther Ex         Nu-step for ROM and strength  Nu step 10 min L3 Nu-step   10 min L5  Nu step " "10 min L5  Nu step 10 min L5 Nu step 10 min L5 Nu step 10 min L5   Leg Press  105# DL 55# SL 2x10 Each Bilat SL Only Right LE   10x Each  55#/ 65#/ 75#  SL Only Right LE 10X Each 55# 65# 75# SL Only Right LE 10X Each 55# 65# 75# SL Only Right LE 10X Each 55# 65# 75# SL Only Right LE 10X Each 65# 75# 85#   Quad set          Supine SLR     S/L R leg raise 10x S/L R leg raise 2x10 S/L R leg raise 2x10   LAQ          F/L step up          Stair HR/TR  2x10 Bilat 4\" step NT  2x10 Bilat 4\" step 2x10 Bilat 4\" step 2x10 Bilat 4\" step 2x10 Bilat 4\" step   HEP update/review          Ther Activity                           Gait Training                           Modalities         CP  15 min Right Knee 15 min Right Knee  15 min Right Knee 15 min Right Knee 15 min Right Knee 15 min Right Knee                                                      "

## 2025-05-05 ENCOUNTER — OFFICE VISIT (OUTPATIENT)
Dept: PHYSICAL THERAPY | Facility: CLINIC | Age: 89
End: 2025-05-05
Attending: ORTHOPAEDIC SURGERY
Payer: COMMERCIAL

## 2025-05-05 DIAGNOSIS — R26.9 GAIT ABNORMALITY: Primary | ICD-10-CM

## 2025-05-05 DIAGNOSIS — Z96.651 H/O TOTAL KNEE REPLACEMENT, RIGHT: ICD-10-CM

## 2025-05-05 DIAGNOSIS — M17.0 PRIMARY OSTEOARTHRITIS OF BOTH KNEES: ICD-10-CM

## 2025-05-05 PROCEDURE — 97140 MANUAL THERAPY 1/> REGIONS: CPT | Performed by: PHYSICAL THERAPIST

## 2025-05-05 PROCEDURE — 97110 THERAPEUTIC EXERCISES: CPT | Performed by: PHYSICAL THERAPIST

## 2025-05-05 PROCEDURE — 97112 NEUROMUSCULAR REEDUCATION: CPT

## 2025-05-05 NOTE — PROGRESS NOTES
Daily Note     Today's date: 2025  Patient name: Toni Mahmood  : 1935  MRN: [id number]  Referring provider: Brandon Bunn DO  Dx:   Encounter Diagnosis     ICD-10-CM    1. Gait abnormality  R26.9       2. Primary osteoarthritis of both knees  M17.0       3. H/O total knee replacement, right  Z96.651                      Subjective:  Patient reports his pain levels remain lower but continuation of weakness in the right knee and LE with difficulty with transfers and walking.        Objective: See treatment diary below      Assessment: Tolerated treatment well.  PT notes continuation of decrease ROM and strength t/o the right knee and LE with demonstration of impaired gait pattern and balance s/p right TKA with need for continuation of skilled therapy.  PT notes focus on TKE with standing and walking to decrease knee flexion during the activity to assist with decrease strain to the right quad.        Plan: Continue per plan of care.      Precautions:  WBAT Right LE, PMH HTN, T2DM, OA  POC 25    Manuals    Right knee flex and ext  5 min 5 min 5 min 5 min 5 min    Right HS, hip ABD, quad, piriformis, and gastroc with manual hip traction  10 min with Focus on TKE 10 min with Focus on TKE 10 min with Focus on TKE 10 min  with Focus on TKE 10 min with Focus on TKE                    Neuro Re-Ed        Front and Lateral Lunge  10X Each Bilat 10x Each Bilat 10x Each Bilat 10x Each Bilat NT    STS- No UE 2x10 8# WB 2x10 8# WB 2x10 8# WB 2x10 8# WB On BOSU  2x10   6# WB    Bridge with ABD  S/L Bridge 2x10 Bilat S/L Bridge 2x10 Bilat S/L Bridge 2x10 Bilat S/L Bridge 2x10 Bilat  S/L Bridge   2x10 Bilat    Rocker board- Tandem F/B and S/S         [facility] March  3 min 3 min 3 min 3 min 3 min    TB TKE                Ther Ex        Nu-step for ROM and strength  Nu step 10 min L5  Nu step 10 min L5 Nu step 10 min L5 Nu step 10 min L5 SRC   10 min L5    Leg Press  SL Only Right LE 10X Each 55#  "65# 75# SL Only Right LE 10X Each 55# 65# 75# SL Only Right LE 10X Each 55# 65# 75# SL Only Right LE 10X Each 65# 75# 85# SL Only Right LE   10x Each   65#, 75#, 85#, 95#    Quad set         Supine SLR   S/L R leg raise 10x S/L R leg raise 2x10 S/L R leg raise 2x10 DC   LAQ         F/L step up         Stair HR/TR  2x10 Bilat 4\" step 2x10 Bilat 4\" step 2x10 Bilat 4\" step 2x10 Bilat 4\" step NT    HEP update/review         Ther Activity                        Gait Training                        Modalities        MHP  15 min Right Knee 15 min Right Knee 15 min Right Knee 15 min Right Knee 15 min Right Knee                                                        "

## 2025-05-07 NOTE — PROGRESS NOTES
Daily Note     Today's date: 2025  Patient name: Toni Mahmood  : 1935  MRN: [id number]  Referring provider: Brandon Bunn DO  Dx:   Encounter Diagnosis     ICD-10-CM    1. Gait abnormality  R26.9       2. Primary osteoarthritis of both knees  M17.0       3. H/O total knee replacement, right  Z96.651                      Subjective: Patient reports some improvements with pain in his R knee but notes he still needs to walk with a walker due to weakness.      Objective: See treatment diary below      Assessment: Patient able to tolerate treatment well today. Patient demonstrates consistent progress with strength and mobility, however, lack of knee flexion still persist. Treatment continues to focus on building flexibility and strength to improve knee mobility and build ms endurance. Patient to benefit from continued skilled PT services to address ongoing impairments.        Plan: Continue per plan of care.      Precautions:  WBAT Right LE, PMH HTN, T2DM, OA  POC 25    Manuals    Right knee flex and ext  5 min 5 min 5 min 5 min  5 min   Right HS, hip ABD, quad, piriformis, and gastroc with manual hip traction  10 min with Focus on TKE 10 min with Focus on TKE 10 min  with Focus on TKE 10 min with Focus on TKE  10 min with Focus on TKE                   Neuro Re-Ed        Front and Lateral Lunge  10x Each Bilat 10x Each Bilat 10x Each Bilat NT  NT   STS- No UE 2x10 8# WB 2x10 8# WB 2x10 8# WB On BOSU  2x10   6# WB  On BOSU  2x10   6# WB    Bridge with ABD  S/L Bridge 2x10 Bilat S/L Bridge 2x10 Bilat S/L Bridge 2x10 Bilat  S/L Bridge   2x10 Bilat  S/L Bridge   2x10 Bilat    Rocker board- Tandem F/B and S/S         U March  3 min 3 min 3 min 3 min  3 min   TB TKE                Ther Ex        Nu-step for ROM and strength  Nu step 10 min L5 Nu step 10 min L5 Nu step 10 min L5 SRC   10 min L5  Nu step 10 min L5   Leg Press  SL Only Right LE 10X Each 55# 65# 75# SL Only Right LE  "10X Each 55# 65# 75# SL Only Right LE 10X Each 65# 75# 85# SL Only Right LE   10x Each   65#, 75#, 85#, 95#  SL Only Right LE   10x Each   65#, 75#, 85#, 95#    Quad set         Supine SLR  S/L R leg raise 10x S/L R leg raise 2x10 S/L R leg raise 2x10 DC    LAQ         F/L step up         Stair HR/TR  2x10 Bilat 4\" step 2x10 Bilat 4\" step 2x10 Bilat 4\" step NT  2x10 Bilat 4\" step   HEP update/review         Ther Activity                        Gait Training                        Modalities        MHP  15 min Right Knee 15 min Right Knee 15 min Right Knee 15 min Right Knee  15 min Right Knee                                                          "

## 2025-05-08 ENCOUNTER — OFFICE VISIT (OUTPATIENT)
Dept: PHYSICAL THERAPY | Facility: CLINIC | Age: 89
End: 2025-05-08
Attending: ORTHOPAEDIC SURGERY
Payer: COMMERCIAL

## 2025-05-08 DIAGNOSIS — Z96.651 H/O TOTAL KNEE REPLACEMENT, RIGHT: ICD-10-CM

## 2025-05-08 DIAGNOSIS — M17.0 PRIMARY OSTEOARTHRITIS OF BOTH KNEES: ICD-10-CM

## 2025-05-08 DIAGNOSIS — R26.9 GAIT ABNORMALITY: Primary | ICD-10-CM

## 2025-05-08 PROCEDURE — 97140 MANUAL THERAPY 1/> REGIONS: CPT

## 2025-05-08 PROCEDURE — 97112 NEUROMUSCULAR REEDUCATION: CPT

## 2025-05-08 PROCEDURE — 97110 THERAPEUTIC EXERCISES: CPT

## 2025-05-09 NOTE — PROGRESS NOTES
Daily Note     Today's date: 2025  Patient name: Toni Mahmood  : 1935  MRN: [id number]  Referring provider: Brandon Bunn DO  Dx:   Encounter Diagnosis     ICD-10-CM    1. Gait abnormality  R26.9       2. Primary osteoarthritis of both knees  M17.0       3. H/O total knee replacement, right  Z96.651                      Subjective: Patient states he is doing better but he is eager to walk with cane.       Objective: See treatment diary below      Assessment: Tolerated treatment well. PTA instructed patient in advancement of AD to SPC; he demonstrates fairly steady gait with but hip drop and weakness still noted on R LE. Discussed continued use of RW for safety while at home or in community as she does have deficits in ms strength and endurance. Patient demonstrated fatigue post treatment, exhibited good technique with therapeutic exercises, and would benefit from continued PT.      Plan: Continue per plan of care.      Precautions:  WBAT Right LE, PMH HTN, T2DM, OA  POC 25    Manuals    Right knee flex and ext  5 min 5 min 5 min  5 min 5 min   Right HS, hip ABD, quad, piriformis, and gastroc with manual hip traction  10 min with Focus on TKE 10 min  with Focus on TKE 10 min with Focus on TKE  10 min with Focus on TKE 10 min with Focus on TKE                   Neuro Re-Ed        Front and Lateral Lunge  10x Each Bilat 10x Each Bilat NT  NT    STS- No UE 2x10 8# WB 2x10 8# WB On BOSU  2x10   6# WB  On BOSU  2x10   6# WB  On BOSU  2x10   6# WB    Bridge with ABD  S/L Bridge 2x10 Bilat S/L Bridge 2x10 Bilat  S/L Bridge   2x10 Bilat  S/L Bridge   2x10 Bilat  S/L Bridge   2x10 Bilat    Rocker board- Tandem F/B and S/S           3 min 3 min 3 min  3 min 3 min   TB TKE                Ther Ex        Nu-step for ROM and strength  Nu step 10 min L5 Nu step 10 min L5 SRC   10 min L5  Nu step 10 min L5 Nu step 10 min L5   Leg Press  SL Only Right LE 10X Each 55# 65# 75# SL  "Only Right LE 10X Each 65# 75# 85# SL Only Right LE   10x Each   65#, 75#, 85#, 95#  SL Only Right LE   10x Each   65#, 75#, 85#, 95#  SL Only Right LE   10x Each   65#, 75#, 85#, 95#    Quad set         Supine SLR  S/L R leg raise 2x10 S/L R leg raise 2x10 DC     LAQ         F/L step up         Stair HR/TR  2x10 Bilat 4\" step 2x10 Bilat 4\" step NT  2x10 Bilat 4\" step 2x10 Bilat 4\" step   HEP update/review         Ther Activity                        Gait Training     10 min                   Modalities        MHP  15 min Right Knee 15 min Right Knee 15 min Right Knee  15 min Right Knee  15 min Right Knee                                                           "

## 2025-05-12 ENCOUNTER — OFFICE VISIT (OUTPATIENT)
Dept: PHYSICAL THERAPY | Facility: CLINIC | Age: 89
End: 2025-05-12
Attending: ORTHOPAEDIC SURGERY
Payer: COMMERCIAL

## 2025-05-12 DIAGNOSIS — Z96.651 H/O TOTAL KNEE REPLACEMENT, RIGHT: ICD-10-CM

## 2025-05-12 DIAGNOSIS — M17.0 PRIMARY OSTEOARTHRITIS OF BOTH KNEES: ICD-10-CM

## 2025-05-12 DIAGNOSIS — R26.9 GAIT ABNORMALITY: Primary | ICD-10-CM

## 2025-05-12 PROCEDURE — 97112 NEUROMUSCULAR REEDUCATION: CPT

## 2025-05-12 PROCEDURE — 97140 MANUAL THERAPY 1/> REGIONS: CPT

## 2025-05-12 PROCEDURE — 97110 THERAPEUTIC EXERCISES: CPT

## 2025-05-14 NOTE — PROGRESS NOTES
PT Re-Evaluation     Today's date: 5/15/2025  Patient name: Toni Mahmood  : 1935  MRN: [id number]  Referring provider: Brandon Bunn DO  Dx:   Encounter Diagnosis     ICD-10-CM    1. Gait abnormality  R26.9       2. Primary osteoarthritis of both knees  M17.0       3. H/O total knee replacement, right  Z96.651                      Assessment  Impairments: abnormal gait, abnormal or restricted ROM, activity intolerance, impaired balance, impaired physical strength, lacks appropriate home exercise program, pain with function, unable to perform ADL and activity limitations    Assessment details: PT notes the patient with continuation of decrease ROM and strength t/o the right knee and LE with demonstration of impaired gait pattern and balance s/p right TKA leading to increase pain levels and functional limitations with need for a continuation of p/o OPPT for 4 weeks with focus on gait/balance, manual therapy, strengthening, analgesic modalities, and transition to HEP.  PT notes the limitations with TKE leading to limitations with transfers and ambulation and difficulty with progression to SPC which PT will continue to address with POC and HEP to meet therapy goals.    Understanding of Dx/Px/POC: good     Prognosis: good    Goals  ST.  Initiate HEP-MET  2.  Decrease pain levels by 25-50%-MET   3.  Increase ROM by 25-50%-MET   4.  Increase strength by 1-2 mm grades-MET  LT.  Improve gait pattern and balance to good/normal level with least restrictive AD-Progressing   2.  Decrease limitations with standing, walking and stairs-Progressing   3.  Decrease limitations with transfers and ADL-Progressing   4.  Decrease limitations with squatting, lifting and carrying-Progressing   5.  DC with HEP-Progressing       Plan  Patient would benefit from: skilled physical therapy  Planned modality interventions: cryotherapy    Planned therapy interventions: manual therapy, neuromuscular re-education,  patient/caregiver education, self care, strengthening, stretching, therapeutic exercise, balance, balance/weight bearing training, flexibility, gait training, graded exercise and home exercise program    Frequency: 2x week  Duration in weeks: 4  Treatment plan discussed with: patient  Plan details: Transition to HEP.         Subjective Evaluation    History of Present Illness  Mechanism of injury: Presently the patient has attended multiple sessions of skilled therapy and feels 80% improvement since the start of therapy.  Patient feels the knee is moving better with increase strength but weakness in the thigh with difficulty with transfers, ambulation and balance with need or continuation of use of RW with ambulation.  Patient reports he continues to try and work on TKE in the right knee with HEP.  Patient feels he requires additional therapy to continue to address weakness and walking deficits.    Patient Goals  Patient goals for therapy: decreased pain, improved balance, increased motion, increased strength and independence with ADLs/IADLs    Pain  Current pain ratin  At best pain ratin  At worst pain ratin  Location: Right Knee  Quality: sharp, dull ache and discomfort  Relieving factors: rest  Aggravating factors: stair climbing, walking, standing and lifting  Progression: improved    Treatments  Previous treatment: injection treatment  Current treatment: injection treatment and physical therapy  Discharged from (in last 30 days): inpatient hospitalization      Objective     Observations     Right Knee   Positive for incision.     Additional Observation Details  PT notes well healed surgical scar with no signs of infection but ecchymosis noted t/o the medial knee joint and LE     Palpation     Right   Tenderness of the vastus lateralis and vastus medialis.     Tenderness     Right Knee   No tenderness in the lateral joint line, LCL (distal), LCL (proximal), MCL (distal), MCL (proximal), medial joint  line, patellar tendon and quadriceps tendon.     Neurological Testing     Reflexes   Left   Patellar (L4): trace (1+)  Achilles (S1): trace (1+)    Right   Patellar (L4): trace (1+)  Achilles (S1): trace (1+)    Active Range of Motion     Right Hip   Flexion: 59 degrees   Extension: 5 degrees   Abduction: WFL  External rotation (90/90): WFL  Internal rotation (90/90): WFL    Right Knee   Flexion: 112 degrees   Extension: -11 degrees with pain    Additional Active Range of Motion Details  PT notes decrease ROM and strength t/o the right knee and LE     Passive Range of Motion     Right Hip   Flexion: 64 degrees   Extension: WFL    Right Knee   Flexion: 116 degrees   Extension: -3 degrees with pain    Mobility   Patellar Mobility:     Right Knee   WFL: medial and lateral  Hypomobile: superior and inferior     Strength/Myotome Testing     Right Hip   Planes of Motion   Flexion: 4  Extension: 5  Abduction: 5  Adduction: 5  External rotation: 4+  Internal rotation: 5    Right Knee   Flexion: 5  Extension: 4  Quadriceps contraction: fair    Tests     Right Hip   Negative NATHAN and DIMITRI.   Chester: Negative.    Modified Chester: Negative.   90/90 SLR: Positive.   SLR: Positive.     Right Knee   Negative anterior Lachman, lateral Reina, medial Reina, posterior Lachman, valgus stress test at 0 degrees and varus stress test at 0 degrees.     Swelling     Left Knee Girth Measurement (cm)   Joint line: 40 cm    Right Knee Girth Measurement (cm)   Joint line: 41 cm    Ambulation   Weight-Bearing Status   Weight-Bearing Status (Right): weight-bearing as tolerated    Assistive device used: front-wheeled walker and single point cane    Observational Gait   Gait: antalgic   Decreased walking speed, stride length and right stance time.     Additional Observational Gait Details  PT notes limitations with TKE on the right LE stance and step over contributing to poor push off and difficulty with permanent progression to SPC with  ambulation              Precautions:  WBAT Right LE, PMH HTN, T2DM, OA  POC 6/15/25

## 2025-05-15 ENCOUNTER — EVALUATION (OUTPATIENT)
Dept: PHYSICAL THERAPY | Facility: CLINIC | Age: 89
End: 2025-05-15
Attending: ORTHOPAEDIC SURGERY
Payer: COMMERCIAL

## 2025-05-15 DIAGNOSIS — R26.9 GAIT ABNORMALITY: Primary | ICD-10-CM

## 2025-05-15 DIAGNOSIS — M17.0 PRIMARY OSTEOARTHRITIS OF BOTH KNEES: ICD-10-CM

## 2025-05-15 DIAGNOSIS — Z96.651 H/O TOTAL KNEE REPLACEMENT, RIGHT: ICD-10-CM

## 2025-05-15 PROCEDURE — 97140 MANUAL THERAPY 1/> REGIONS: CPT

## 2025-05-15 PROCEDURE — 97112 NEUROMUSCULAR REEDUCATION: CPT

## 2025-05-15 PROCEDURE — 97140 MANUAL THERAPY 1/> REGIONS: CPT | Performed by: PHYSICAL THERAPIST

## 2025-05-15 PROCEDURE — 97110 THERAPEUTIC EXERCISES: CPT

## 2025-05-15 NOTE — PROGRESS NOTES
Daily Note     Today's date: 5/15/2025  Patient name: Toni Mahmood  : 1935  MRN: [id number]  Referring provider: Brandon Bunn DO  Dx:   Encounter Diagnosis     ICD-10-CM    1. Gait abnormality  R26.9       2. Primary osteoarthritis of both knees  M17.0       3. H/O total knee replacement, right  Z96.651                      Subjective: Patient states that he is doing better but he still feels too weak to use his cane. He hope he can continue to improve his strength.       Objective: See treatment diary below      Assessment: Patient tolerated treatment and progression to well. Patient continues to be limited in knee extension and has decreased B hip strength. Patient demonstrated fatigue post treatment and would benefit from continued PT to address ongoing impairments and improve functional mobility to progress to SPC ambulation.       Plan: Continue per plan of care.      Precautions:  WBAT Right LE, PMH HTN, T2DM, OA  POC 25    Manuals 5/1 5/5 5/8 5/12 5/15   Right knee flex and ext  5 min 5 min  5 min 5 min 5 min   Right HS, hip ABD, quad, piriformis, and gastroc with manual hip traction  10 min  with Focus on TKE 10 min with Focus on TKE  10 min with Focus on TKE 10 min with Focus on TKE 10 min with Focus on TKE                   Neuro Re-Ed        Front and Lateral Lunge  10x Each Bilat NT  NT     STS- No UE 2x10 8# WB On BOSU  2x10   6# WB  On BOSU  2x10   6# WB  On BOSU  2x10   6# WB  On BOSU  2x10   6# WB    Bridge with ABD  S/L Bridge 2x10 Bilat  S/L Bridge   2x10 Bilat  S/L Bridge   2x10 Bilat  S/L Bridge   2x10 Bilat  S/L Bridge   2x10 Bilat    Rocker board- Tandem F/B and S/S         U March  3 min 3 min  3 min 3 min 3 min   TB TKE             Push/pull cart 25# 2 laps   Ther Ex        Nu-step for ROM and strength  Nu step 10 min L5 SRC   10 min L5  Nu step 10 min L5 Nu step 10 min L5 Nu step 10 min L5   Leg Press  SL Only Right LE 10X Each 65# 75# 85# SL Only Right LE   10x Each  "  65#, 75#, 85#, 95#  SL Only Right LE   10x Each   65#, 75#, 85#, 95#  SL Only Right LE   10x Each   65#, 75#, 85#, 95#  SL Only Right LE   10x Each   65#, 75#, 85#, 95#    Quad set         Supine SLR  S/L R leg raise 2x10 DC      LAQ         F/L step up         Stair HR/TR  2x10 Bilat 4\" step NT  2x10 Bilat 4\" step 2x10 Bilat 4\" step 2x10 Bilat 4\" step   HEP update/review         Ther Activity                        Gait Training    10 min                    Modalities        MHP  15 min Right Knee 15 min Right Knee  15 min Right Knee  15 min Right Knee 15 min Right Knee                                                             "

## 2025-05-16 NOTE — PROGRESS NOTES
Daily Note     Today's date: 2025  Patient name: Toni Mahmood  : 1935  MRN: [id number]  Referring provider: Brandon Bunn DO  Dx:   Encounter Diagnosis     ICD-10-CM    1. Gait abnormality  R26.9       2. Primary osteoarthritis of both knees  M17.0       3. H/O total knee replacement, right  Z96.651                      Subjective: Patient states that he believes his R LE is getting stronger.       Objective: See treatment diary below      Assessment: Tolerated treatment well. PTA instructed patient in advancement of AD to SPC; demonstrates fairly steady gait minimal hip drop and fairly equal stance time on BLE. Discussed continued use of RW for safety while at home or in community as he continues to have deficits in ms strength and endurance. Overall progressing towards goals. Patient would benefit from continued OP PT services in order to address ongoing impairments and improve functional activity limitations.        Plan: Continue per plan of care.      Precautions:  WBAT Right LE, PMH HTN, T2DM, OA  POC 25    Manuals 5/5 5/8 5/12 5/15 5/19   Right knee flex and ext  5 min  5 min 5 min 5 min 5 min   Right HS, hip ABD, quad, piriformis, and gastroc with manual hip traction  10 min with Focus on TKE  10 min with Focus on TKE 10 min with Focus on TKE 10 min with Focus on TKE 10 min with Focus on TKE                   Neuro Re-Ed        Front and Lateral Lunge  NT  NT      STS- No UE On BOSU  2x10   6# WB  On BOSU  2x10   6# WB  On BOSU  2x10   6# WB  On BOSU  2x10   6# WB  On BOSU  2x10   6# WB    Bridge with ABD  S/L Bridge   2x10 Bilat  S/L Bridge   2x10 Bilat  S/L Bridge   2x10 Bilat  S/L Bridge   2x10 Bilat  S/L Bridge   2x10 Bilat    Rocker board- Tandem F/B and S/S           3 min  3 min 3 min 3 min NT   TB TKE            Push/pull cart 25# 2 laps Push/pull cart 25# 2 laps   Ther Ex        Nu-step for ROM and strength  SRC   10 min L5  Nu step 10 min L5 Nu step 10 min L5 Nu  "step 10 min L5 Nu step 10 min L5   Leg Press  SL Only Right LE   10x Each   65#, 75#, 85#, 95#  SL Only Right LE   10x Each   65#, 75#, 85#, 95#  SL Only Right LE   10x Each   65#, 75#, 85#, 95#  SL Only Right LE   10x Each   65#, 75#, 85#, 95#  SL Only Right LE   10x Each   65#, 75#, 85#, 95#    Quad set         Supine SLR  DC       LAQ         F/L step up         Stair HR/TR  NT  2x10 Bilat 4\" step 2x10 Bilat 4\" step 2x10 Bilat 4\" step NT   HEP update/review         Ther Activity                        Gait Training   10 min  10 min                   Modalities        MHP  15 min Right Knee  15 min Right Knee  15 min Right Knee 15 min Right Knee 15 min Right Knee                                                               "

## 2025-05-19 ENCOUNTER — OFFICE VISIT (OUTPATIENT)
Dept: PHYSICAL THERAPY | Facility: CLINIC | Age: 89
End: 2025-05-19
Attending: ORTHOPAEDIC SURGERY
Payer: COMMERCIAL

## 2025-05-19 DIAGNOSIS — R26.9 GAIT ABNORMALITY: Primary | ICD-10-CM

## 2025-05-19 DIAGNOSIS — Z96.651 H/O TOTAL KNEE REPLACEMENT, RIGHT: ICD-10-CM

## 2025-05-19 DIAGNOSIS — M17.0 PRIMARY OSTEOARTHRITIS OF BOTH KNEES: ICD-10-CM

## 2025-05-19 PROCEDURE — 97140 MANUAL THERAPY 1/> REGIONS: CPT

## 2025-05-19 PROCEDURE — 97112 NEUROMUSCULAR REEDUCATION: CPT

## 2025-05-19 PROCEDURE — 97110 THERAPEUTIC EXERCISES: CPT

## 2025-05-19 PROCEDURE — 97116 GAIT TRAINING THERAPY: CPT

## 2025-05-21 ENCOUNTER — OFFICE VISIT (OUTPATIENT)
Dept: OBGYN CLINIC | Facility: CLINIC | Age: 89
End: 2025-05-21
Payer: COMMERCIAL

## 2025-05-21 VITALS — HEIGHT: 66 IN | WEIGHT: 195.4 LBS | BODY MASS INDEX: 31.4 KG/M2

## 2025-05-21 DIAGNOSIS — Z96.651 PRESENCE OF ARTIFICIAL KNEE JOINT, RIGHT: Primary | ICD-10-CM

## 2025-05-21 PROCEDURE — 99213 OFFICE O/P EST LOW 20 MIN: CPT | Performed by: ORTHOPAEDIC SURGERY

## 2025-05-21 NOTE — PROGRESS NOTES
Daily Note     Today's date: 2025  Patient name: Toni Mahmood  : 1935  MRN: [id number]  Referring provider: Brandon Bunn DO  Dx:   Encounter Diagnosis     ICD-10-CM    1. Gait abnormality  R26.9       2. Primary osteoarthritis of both knees  M17.0       3. H/O total knee replacement, right  Z96.651                      Subjective: Patient states that he had a follow up with his doctor and his doctor is happy with his progress and would like him to be done PT.       Objective: See treatment diary below      Assessment: Patient tolerate treatment well today. Patient has met all therapy goals and is pleased with progress and voices agreement with D/C. PTA updated and reviewed HEP to continue with independently.        Plan: Continue per plan of care.      Precautions:  WBAT Right LE, PMH HTN, T2DM, OA  POC 25    Manuals 5/8 5/12 5/15 5/19 5/22   Right knee flex and ext  5 min 5 min 5 min 5 min 5 min   Right HS, hip ABD, quad, piriformis, and gastroc with manual hip traction  10 min with Focus on TKE 10 min with Focus on TKE 10 min with Focus on TKE 10 min with Focus on TKE 10 min with Focus on TKE                   Neuro Re-Ed        Front and Lateral Lunge  NT       STS- No UE On BOSU  2x10   6# WB  On BOSU  2x10   6# WB  On BOSU  2x10   6# WB  On BOSU  2x10   6# WB     Bridge with ABD  S/L Bridge   2x10 Bilat  S/L Bridge   2x10 Bilat  S/L Bridge   2x10 Bilat  S/L Bridge   2x10 Bilat     Rocker board- Tandem F/B and S/S         BOSU March  3 min 3 min 3 min NT    TB TKE           Push/pull cart 25# 2 laps Push/pull cart 25# 2 laps    Ther Ex        Nu-step for ROM and strength  Nu step 10 min L5 Nu step 10 min L5 Nu step 10 min L5 Nu step 10 min L5 Nu step 10 min L5   Leg Press  SL Only Right LE   10x Each   65#, 75#, 85#, 95#  SL Only Right LE   10x Each   65#, 75#, 85#, 95#  SL Only Right LE   10x Each   65#, 75#, 85#, 95#  SL Only Right LE   10x Each   65#, 75#, 85#, 95#     Quad set        "  Supine SLR         LAQ         F/L step up         Stair HR/TR  2x10 Bilat 4\" step 2x10 Bilat 4\" step 2x10 Bilat 4\" step NT    HEP update/review      25 min   Ther Activity                        Gait Training  10 min  10 min                    Modalities        MHP  15 min Right Knee  15 min Right Knee 15 min Right Knee 15 min Right Knee 15 min Right Knee                                                                 "

## 2025-05-21 NOTE — ASSESSMENT & PLAN NOTE
Okay to transition from formal physical therapy to home exercise program is becoming cost prohibitive.  Extensively reviewed working on continued knee extension.  Plan to see the patient back in 9 months for 1 year postop follow-up and x-ray or can be seen sooner for any other problems or concerns.  Was reviewed with the patient and his wife that he has explained 1 year from the time of surgery to get as much motion as possible.  
No

## 2025-05-21 NOTE — PATIENT INSTRUCTIONS
Okay to transition from formal physical therapy to home exercise program is becoming cost prohibitive.  Extensively reviewed working on continued knee extension.  Plan to see the patient back in 9 months for 1 year postop follow-up and x-ray or can be seen sooner for any other problems or concerns.  Was reviewed with the patient and his wife that he has explained 1 year from the time of surgery to get as much motion as possible.

## 2025-05-21 NOTE — PROGRESS NOTES
Name: Toni Mahmood      : 1935      MRN: [id number]  Encounter Provider: Brandon Bunn DO  Encounter Date: 2025   Encounter department: [facility] [place]  :  Assessment & Plan  Presence of artificial knee joint, right       Okay to transition from formal physical therapy to home exercise program is becoming cost prohibitive.  Extensively reviewed working on continued knee extension.  Plan to see the patient back in 9 months for 1 year postop follow-up and x-ray or can be seen sooner for any other problems or concerns.  Was reviewed with the patient and his wife that he has explained 1 year from the time of surgery to get as much motion as possible.      History of Present Illness   HPI  Toni Mahmood is a 89 y.o. male who presents routine follow-up of his right total knee arthroplasty, DOS 2025, proximately 3 months ago.  He notes that he is feeling better and moving a lot better.  Therapy notes suggest some continuation of physical therapy.  Patient reports that it is difficult for him to continue therapy as it is too costly and has become financially prohibitive.  He denies any numbness or tingling.  Will ambulate with a quad cane and sometimes without anything.  He reports he has no problems going up or down stairs.  He notes he does have some start up soreness and stiffness.    Review of Systems  Medications Ordered Prior to Encounter[1]   Social History     Tobacco Use    Smoking status: Never    Smokeless tobacco: Never    Tobacco comments:     Smoked at age 23 for about a year   Vaping Use    Vaping status: Never Used   Substance and Sexual Activity    Alcohol use: Not Currently     Comment: Occasional Beer    Drug use: Never    Sexual activity: Not on file      Objective   There were no vitals taken for this visit.     Physical Exam    Right knee incision is well-healed without a thick subcutaneous scar.  His active motion is approximately 12 to 110  degrees passively he can go to 520 degrees.  No gross laxity with medial or lateral valgus stress.  Patella moves well in the medial/lateral plane.  Quad strength 4+/5 hamstring strength 5/5.  Patient has a slight antalgic gait secondary to restricted knee extension.    PT notes were reviewed.         [1]   Current Outpatient Medications on File Prior to Visit   Medication Sig Dispense Refill    acetaminophen (TYLENOL) 500 mg tablet Take 1 tablet (500 mg total) by mouth every 8 (eight) hours (Patient not taking: Reported on 4/23/2025) 30 tablet 1    amLODIPine (NORVASC) 2.5 mg tablet Take 5 mg by mouth daily      ascorbic acid (VITAMIN C) 500 MG tablet Take 1 tablet (500 mg total) by mouth 2 (two) times a day 120 tablet 0    aspirin (Aspirin 81) 81 mg EC tablet Take by mouth      cholecalciferol (VITAMIN D3) 25 mcg (1,000 units) tablet Take 1 tablet (1,000 Units total) by mouth daily 60 tablet 0    ferrous sulfate 325 (65 Fe) mg tablet Take 325 mg by mouth daily with breakfast      folic acid (FOLVITE) 1 mg tablet Take 1 tablet (1 mg total) by mouth daily (Patient not taking: Reported on 4/23/2025) 60 tablet 0    furosemide (LASIX) 20 mg tablet Take 20 mg by mouth daily      latanoprost (XALATAN) 0.005 % ophthalmic solution       lisinopril (ZESTRIL) 20 mg tablet       metFORMIN (GLUCOPHAGE) 500 mg tablet TAKE 1 TABLET BY MOUTH TWICE DAILY WITH MORNING MEAL AND WITH EVENING MEAL      Multiple Vitamins-Minerals (multivitamin with minerals) tablet Take 1 tablet by mouth daily 60 tablet 0    potassium chloride (MICRO-K) 10 MEQ CR capsule Take 10 mEq by mouth daily      timolol (TIMOPTIC) 0.5 % ophthalmic solution  (Patient not taking: Reported on 4/23/2025)      zinc sulfate (ZINCATE) 220 mg capsule Take 1 capsule (220 mg total) by mouth daily (Patient not taking: Reported on 4/23/2025) 60 capsule 0     No current facility-administered medications on file prior to visit.

## 2025-05-22 ENCOUNTER — OFFICE VISIT (OUTPATIENT)
Dept: PHYSICAL THERAPY | Facility: CLINIC | Age: 89
End: 2025-05-22
Attending: ORTHOPAEDIC SURGERY
Payer: COMMERCIAL

## 2025-05-22 DIAGNOSIS — M17.0 PRIMARY OSTEOARTHRITIS OF BOTH KNEES: ICD-10-CM

## 2025-05-22 DIAGNOSIS — R26.9 GAIT ABNORMALITY: Primary | ICD-10-CM

## 2025-05-22 DIAGNOSIS — Z96.651 H/O TOTAL KNEE REPLACEMENT, RIGHT: ICD-10-CM

## 2025-05-22 PROCEDURE — 97535 SELF CARE MNGMENT TRAINING: CPT

## 2025-05-22 PROCEDURE — 97140 MANUAL THERAPY 1/> REGIONS: CPT

## 2025-05-22 PROCEDURE — 97110 THERAPEUTIC EXERCISES: CPT

## 2025-05-22 NOTE — PROGRESS NOTES
PT Discharge    Today's date: 2025  Patient name: Toni Mahmood  : 1935  MRN: [id number]  Referring provider: Brandon Bunn DO  Dx:   Encounter Diagnosis     ICD-10-CM    1. Gait abnormality  R26.9       2. Primary osteoarthritis of both knees  M17.0       3. H/O total knee replacement, right  Z96.651                      Assessment  Impairments: abnormal gait, abnormal or restricted ROM, activity intolerance, impaired balance, impaired physical strength, lacks appropriate home exercise program, pain with function, unable to perform ADL and activity limitations    Assessment details: PT notes the patient with improved ROM and strength t/o the right knee and LE with demonstration of improved gait pattern and balance s/p right TKA.  PT notes the patient has met the majority of therapy goals and is ready for a transition to HEP.   Understanding of Dx/Px/POC: good     Prognosis: good    Goals  ST.  Initiate HEP-MET  2.  Decrease pain levels by 25-50%-MET   3.  Increase ROM by 25-50%-MET   4.  Increase strength by 1-2 mm grades-MET  LT.  Improve gait pattern and balance to good/normal level with least restrictive AD-Progressing   2.  Decrease limitations with standing, walking and stairs-Progressing   3.  Decrease limitations with transfers and ADL-MET  4.  Decrease limitations with squatting, lifting and carrying-Progressing   5.  DC with HEP-MET      Plan  Patient would benefit from: skilled physical therapy  Planned modality interventions: cryotherapy    Planned therapy interventions: manual therapy, neuromuscular re-education, patient/caregiver education, self care, strengthening, stretching, therapeutic exercise, balance, balance/weight bearing training, flexibility, gait training, graded exercise and home exercise program    Frequency: 2x week  Duration in weeks: 1  Treatment plan discussed with: patient  Plan details: Transition to HEP.         Subjective Evaluation    History of  Present Illness  Mechanism of injury: Presently the patient has attended multiple sessions of skilled therapy and feels 80% improvement since the start of therapy.  Patient feels the knee is moving better with increase strength with improved strength in the left knee and LE with progression to [facility] with ambulation.  Patient reports he is happy with current status and ready for a transition to [facility].   Patient Goals  Patient goals for therapy: decreased pain, improved balance, increased motion, increased strength and independence with ADLs/IADLs    Pain  Current pain ratin  At best pain ratin  At worst pain ratin  Location: Right Knee  Quality: sharp, dull ache and discomfort  Relieving factors: rest  Aggravating factors: stair climbing, walking, standing and lifting  Progression: improved    Treatments  Previous treatment: injection treatment  Current treatment: injection treatment and physical therapy  Discharged from (in last 30 days): inpatient hospitalization        Objective     Observations     Right Knee   Positive for incision.     Additional Observation Details  PT notes well healed surgical scar with no signs of infection but ecchymosis noted t/o the medial knee joint and LE     Palpation     Right   Tenderness of the vastus lateralis and vastus medialis.     Tenderness     Right Knee   No tenderness in the lateral joint line, LCL (distal), LCL (proximal), MCL (distal), MCL (proximal), medial joint line, patellar tendon and quadriceps tendon.     Neurological Testing     Reflexes   Left   Patellar (L4): trace (1+)  Achilles (S1): trace (1+)    Right   Patellar (L4): trace (1+)  Achilles (S1): trace (1+)    Active Range of Motion     Right Hip   Flexion: 59 degrees   Extension: 5 degrees   Abduction: WFL  External rotation (90/90): WFL  Internal rotation (90/90): WFL    Right Knee   Flexion: 112 degrees   Extension: -11 degrees with pain    Additional Active Range of Motion Details  PT notes  decrease ROM and strength t/o the right knee and LE     Passive Range of Motion     Right Hip   Flexion: 64 degrees   Extension: WFL    Right Knee   Flexion: 116 degrees   Extension: -3 degrees with pain    Mobility   Patellar Mobility:     Right Knee   WFL: medial and lateral  Hypomobile: superior and inferior     Strength/Myotome Testing     Right Hip   Planes of Motion   Flexion: 4  Extension: 5  Abduction: 5  Adduction: 5  External rotation: 4+  Internal rotation: 5    Right Knee   Flexion: 5  Extension: 4  Quadriceps contraction: fair    Tests     Right Hip   Negative NATHAN and DIMITRI.   Chester: Negative.    Modified [place]: Negative.   90/90 SLR: Positive.   SLR: Positive.     Right Knee   Negative anterior Lachman, lateral Reina, medial Reina, posterior Lachman, valgus stress test at 0 degrees and varus stress test at 0 degrees.     Swelling     Left Knee Girth Measurement (cm)   Joint line: 40 cm    Right Knee Girth Measurement (cm)   Joint line: 41 cm    Ambulation   Weight-Bearing Status   Weight-Bearing Status (Right): weight-bearing as tolerated    Assistive device used: front-wheeled walker and single point cane    Observational Gait   Gait: antalgic   Decreased walking speed, stride length and right stance time.     Additional Observational Gait Details  PT notes limitations with TKE on the right LE stance and step over contributing to poor push off and difficulty with permanent progression to SPC with ambulation              Precautions:  WBAT Right LE, PMH HTN, T2DM, OA  POC 6/15/25

## 2025-05-27 ENCOUNTER — APPOINTMENT (OUTPATIENT)
Dept: PHYSICAL THERAPY | Facility: CLINIC | Age: 89
End: 2025-05-27
Attending: ORTHOPAEDIC SURGERY
Payer: COMMERCIAL

## 2025-05-29 ENCOUNTER — APPOINTMENT (OUTPATIENT)
Dept: PHYSICAL THERAPY | Facility: CLINIC | Age: 89
End: 2025-05-29
Attending: ORTHOPAEDIC SURGERY
Payer: COMMERCIAL

## (undated) DEVICE — GLOVE SRG BIOGEL ECLIPSE 7

## (undated) DEVICE — PAD CAST 4 IN COTTON NON STERILE

## (undated) DEVICE — COBAN 6 IN STERILE

## (undated) DEVICE — RECIPROCATING BLADE, DOUBLE SIDED, OFFSET  (70.0 X 0.64 X 12.6MM)

## (undated) DEVICE — DISPOSABLE EQUIPMENT COVER: Brand: SMALL TOWEL DRAPE

## (undated) DEVICE — GLOVE INDICATOR PI UNDERGLOVE SZ 7 BLUE

## (undated) DEVICE — SUT VICRYL 0 CT-1 27 IN J260H

## (undated) DEVICE — SUT VICRYL 2-0 CP-1 27 IN J266H

## (undated) DEVICE — GLOVE SRG BIOGEL ECLIPSE 8

## (undated) DEVICE — CEMENT MIXING SYSTEM WITH FEMORAL BREAKWAY NOZZLE: Brand: REVOLUTION

## (undated) DEVICE — CAPIT KNEE ATTUNE FB W/DOM

## (undated) DEVICE — CUFF TOURNIQUET 30 X 4 IN QUICK CONNECT DISP 1BLA

## (undated) DEVICE — DRESSING MEPILEX AG BORDER POST-OP 4 X 10 IN

## (undated) DEVICE — SYRINGE 30ML LL

## (undated) DEVICE — MAYO STAND COVER: Brand: CONVERTORS

## (undated) DEVICE — EXOFIN PRECISION PEN HIGH VISCOSITY TOPICAL SKIN ADHESIVE: Brand: EXOFIN PRECISION PEN, 1G

## (undated) DEVICE — GLOVE INDICATOR PI UNDERGLOVE SZ 8 BLUE

## (undated) DEVICE — GLOVE SRG BIOGEL 7.5

## (undated) DEVICE — SPONGE 4 X 4 XRAY 16 PLY STRL LF RFD

## (undated) DEVICE — COMFORT HOOD -75 EA/BX: Brand: CARDINAL HEALTH

## (undated) DEVICE — BETHLEHEM UNIV TOTAL KNEE, KIT: Brand: CARDINAL HEALTH

## (undated) DEVICE — 3M™ DURAPORE™ SURGICAL TAPE 1538-3, 3 INCH X 10 YARD (7,5CM X 9,1M), 4 ROLLS/BOX: Brand: 3M™ DURAPORE™

## (undated) DEVICE — BASIC SINGLE BASIN 2-LF: Brand: MEDLINE INDUSTRIES, INC.

## (undated) DEVICE — NEPTUNE E-SEP SMOKE EVACUATION PENCIL, COATED, 70MM BLADE, PUSH BUTTON SWITCH: Brand: NEPTUNE E-SEP

## (undated) DEVICE — NEEDLE SPINAL 18G X 3IN DISP

## (undated) DEVICE — NEEDLE 18 G X 1 1/2 SAFETY

## (undated) DEVICE — DUAL CUT SAGITTAL BLADE

## (undated) DEVICE — GLOVE INDICATOR PI UNDERGLOVE SZ 7.5 BLUE

## (undated) DEVICE — GLOVE SRG BIOGEL ECLIPSE 7.5

## (undated) DEVICE — DRAPE EQUIPMENT RF WAND

## (undated) DEVICE — HANDPIECE SET WITH RETRACTABLE COAXIAL FAN SPRAY TIP AND SUCTION TUBE: Brand: INTERPULSE